# Patient Record
Sex: MALE | Race: WHITE | Employment: OTHER
[De-identification: names, ages, dates, MRNs, and addresses within clinical notes are randomized per-mention and may not be internally consistent; named-entity substitution may affect disease eponyms.]

---

## 2017-02-09 ENCOUNTER — TELEPHONE (OUTPATIENT)
Dept: UROLOGY | Facility: CLINIC | Age: 62
End: 2017-02-09

## 2017-02-23 ENCOUNTER — TELEPHONE (OUTPATIENT)
Dept: UROLOGY | Facility: CLINIC | Age: 62
End: 2017-02-23

## 2017-03-29 ENCOUNTER — TELEPHONE (OUTPATIENT)
Dept: UROLOGY | Age: 62
End: 2017-03-29

## 2017-04-03 ENCOUNTER — OFFICE VISIT (OUTPATIENT)
Dept: UROLOGY | Age: 62
End: 2017-04-03
Payer: COMMERCIAL

## 2017-04-03 VITALS
BODY MASS INDEX: 32.55 KG/M2 | WEIGHT: 240.3 LBS | SYSTOLIC BLOOD PRESSURE: 110 MMHG | DIASTOLIC BLOOD PRESSURE: 75 MMHG | HEIGHT: 72 IN | HEART RATE: 76 BPM | TEMPERATURE: 98.1 F

## 2017-04-03 DIAGNOSIS — Z12.5 PROSTATE CANCER SCREENING: ICD-10-CM

## 2017-04-03 DIAGNOSIS — N20.0 KIDNEY STONE: Primary | ICD-10-CM

## 2017-04-03 PROCEDURE — 99213 OFFICE O/P EST LOW 20 MIN: CPT | Performed by: UROLOGY

## 2017-04-03 RX ORDER — INSULIN GLARGINE 300 U/ML
58 INJECTION, SOLUTION SUBCUTANEOUS EVERY MORNING
COMMUNITY
Start: 2016-12-26 | End: 2019-06-14

## 2017-04-03 ASSESSMENT — ENCOUNTER SYMPTOMS
COUGH: 0
SHORTNESS OF BREATH: 0
NAUSEA: 0
EYE REDNESS: 0
ABDOMINAL PAIN: 0
BACK PAIN: 0
COLOR CHANGE: 0
VOMITING: 0
WHEEZING: 0
EYE PAIN: 0

## 2017-10-19 ENCOUNTER — HOSPITAL ENCOUNTER (EMERGENCY)
Age: 62
Discharge: HOME | End: 2017-10-19
Attending: EMERGENCY MEDICINE
Payer: COMMERCIAL

## 2017-10-19 ENCOUNTER — APPOINTMENT (OUTPATIENT)
Dept: CT IMAGING | Age: 62
End: 2017-10-19
Payer: COMMERCIAL

## 2017-10-19 VITALS
WEIGHT: 230 LBS | TEMPERATURE: 97.5 F | OXYGEN SATURATION: 97 % | BODY MASS INDEX: 31.15 KG/M2 | DIASTOLIC BLOOD PRESSURE: 75 MMHG | SYSTOLIC BLOOD PRESSURE: 137 MMHG | RESPIRATION RATE: 20 BRPM | HEIGHT: 72 IN | HEART RATE: 87 BPM

## 2017-10-19 DIAGNOSIS — N20.0 KIDNEY STONE: Primary | ICD-10-CM

## 2017-10-19 LAB
-: ABNORMAL
ABSOLUTE BANDS #: 0.27 K/UL (ref 0–1)
ABSOLUTE EOS #: 0.18 K/UL (ref 0–0.4)
ABSOLUTE IMMATURE GRANULOCYTE: ABNORMAL K/UL (ref 0–0.3)
ABSOLUTE LYMPH #: 0.9 K/UL (ref 1–4.8)
ABSOLUTE MONO #: 0.77 K/UL (ref 0.1–1.3)
ALBUMIN SERPL-MCNC: 4.4 G/DL (ref 3.5–5.2)
ALBUMIN/GLOBULIN RATIO: ABNORMAL (ref 1–2.5)
ALP BLD-CCNC: 39 U/L (ref 40–129)
ALT SERPL-CCNC: 52 U/L (ref 5–41)
AMORPHOUS: ABNORMAL
ANION GAP SERPL CALCULATED.3IONS-SCNC: 14 MMOL/L (ref 9–17)
AST SERPL-CCNC: 33 U/L
BACTERIA: ABNORMAL
BANDS: 6 %
BASOPHILS # BLD: 1 %
BASOPHILS ABSOLUTE: 0.05 K/UL (ref 0–0.2)
BILIRUB SERPL-MCNC: 0.57 MG/DL (ref 0.3–1.2)
BILIRUBIN URINE: NEGATIVE
BUN BLDV-MCNC: 22 MG/DL (ref 8–23)
BUN/CREAT BLD: ABNORMAL (ref 9–20)
CALCIUM SERPL-MCNC: 9.5 MG/DL (ref 8.6–10.4)
CASTS UA: ABNORMAL /LPF
CHLORIDE BLD-SCNC: 103 MMOL/L (ref 98–107)
CO2: 22 MMOL/L (ref 20–31)
COLOR: YELLOW
COMMENT UA: ABNORMAL
CREAT SERPL-MCNC: 1.01 MG/DL (ref 0.7–1.2)
CRYSTALS, UA: ABNORMAL /HPF
DIFFERENTIAL TYPE: ABNORMAL
EOSINOPHILS RELATIVE PERCENT: 4 %
EPITHELIAL CELLS UA: ABNORMAL /HPF
GFR AFRICAN AMERICAN: >60 ML/MIN
GFR NON-AFRICAN AMERICAN: >60 ML/MIN
GFR SERPL CREATININE-BSD FRML MDRD: ABNORMAL ML/MIN/{1.73_M2}
GFR SERPL CREATININE-BSD FRML MDRD: ABNORMAL ML/MIN/{1.73_M2}
GLUCOSE BLD-MCNC: 252 MG/DL (ref 70–99)
GLUCOSE URINE: ABNORMAL
HCT VFR BLD CALC: 50.3 % (ref 41–53)
HEMOGLOBIN: 17.4 G/DL (ref 13.5–17.5)
IMMATURE GRANULOCYTES: ABNORMAL %
KETONES, URINE: NEGATIVE
LEUKOCYTE ESTERASE, URINE: NEGATIVE
LIPASE: 59 U/L (ref 13–60)
LYMPHOCYTES # BLD: 20 %
MCH RBC QN AUTO: 30.5 PG (ref 26–34)
MCHC RBC AUTO-ENTMCNC: 34.6 G/DL (ref 31–37)
MCV RBC AUTO: 88.1 FL (ref 80–100)
MONOCYTES # BLD: 17 %
MORPHOLOGY: NORMAL
MUCUS: ABNORMAL
NITRITE, URINE: NEGATIVE
OTHER OBSERVATIONS UA: ABNORMAL
PDW BLD-RTO: 13 % (ref 11.5–14.9)
PH UA: 6.5 (ref 5–8)
PLATELET # BLD: 181 K/UL (ref 150–450)
PLATELET ESTIMATE: ABNORMAL
PMV BLD AUTO: 9.2 FL (ref 6–12)
POTASSIUM SERPL-SCNC: 4.5 MMOL/L (ref 3.7–5.3)
PROTEIN UA: NEGATIVE
RBC # BLD: 5.71 M/UL (ref 4.5–5.9)
RBC # BLD: ABNORMAL 10*6/UL
RBC UA: ABNORMAL /HPF
RENAL EPITHELIAL, UA: ABNORMAL /HPF
SEG NEUTROPHILS: 52 %
SEGMENTED NEUTROPHILS ABSOLUTE COUNT: 2.33 K/UL (ref 1.3–9.1)
SODIUM BLD-SCNC: 139 MMOL/L (ref 135–144)
SPECIFIC GRAVITY UA: 1.03 (ref 1–1.03)
TOTAL PROTEIN: 7.6 G/DL (ref 6.4–8.3)
TRICHOMONAS: ABNORMAL
TURBIDITY: CLEAR
URINE HGB: ABNORMAL
UROBILINOGEN, URINE: NORMAL
WBC # BLD: 4.5 K/UL (ref 3.5–11)
WBC # BLD: ABNORMAL 10*3/UL
WBC UA: ABNORMAL /HPF
YEAST: ABNORMAL

## 2017-10-19 PROCEDURE — 74176 CT ABD & PELVIS W/O CONTRAST: CPT

## 2017-10-19 PROCEDURE — 96375 TX/PRO/DX INJ NEW DRUG ADDON: CPT

## 2017-10-19 PROCEDURE — 80053 COMPREHEN METABOLIC PANEL: CPT

## 2017-10-19 PROCEDURE — 36415 COLL VENOUS BLD VENIPUNCTURE: CPT

## 2017-10-19 PROCEDURE — 99284 EMERGENCY DEPT VISIT MOD MDM: CPT

## 2017-10-19 PROCEDURE — 81001 URINALYSIS AUTO W/SCOPE: CPT

## 2017-10-19 PROCEDURE — 83690 ASSAY OF LIPASE: CPT

## 2017-10-19 PROCEDURE — 2580000003 HC RX 258: Performed by: EMERGENCY MEDICINE

## 2017-10-19 PROCEDURE — 85025 COMPLETE CBC W/AUTO DIFF WBC: CPT

## 2017-10-19 PROCEDURE — 96374 THER/PROPH/DIAG INJ IV PUSH: CPT

## 2017-10-19 PROCEDURE — 6360000002 HC RX W HCPCS: Performed by: EMERGENCY MEDICINE

## 2017-10-19 RX ORDER — ONDANSETRON 2 MG/ML
4 INJECTION INTRAMUSCULAR; INTRAVENOUS ONCE
Status: COMPLETED | OUTPATIENT
Start: 2017-10-19 | End: 2017-10-19

## 2017-10-19 RX ORDER — KETOROLAC TROMETHAMINE 30 MG/ML
30 INJECTION, SOLUTION INTRAMUSCULAR; INTRAVENOUS ONCE
Status: COMPLETED | OUTPATIENT
Start: 2017-10-19 | End: 2017-10-19

## 2017-10-19 RX ORDER — 0.9 % SODIUM CHLORIDE 0.9 %
1000 INTRAVENOUS SOLUTION INTRAVENOUS ONCE
Status: COMPLETED | OUTPATIENT
Start: 2017-10-19 | End: 2017-10-19

## 2017-10-19 RX ADMIN — ONDANSETRON 4 MG: 2 INJECTION INTRAMUSCULAR; INTRAVENOUS at 10:26

## 2017-10-19 RX ADMIN — KETOROLAC TROMETHAMINE 30 MG: 30 INJECTION, SOLUTION INTRAMUSCULAR at 10:26

## 2017-10-19 RX ADMIN — SODIUM CHLORIDE 1000 ML: 9 INJECTION, SOLUTION INTRAVENOUS at 10:27

## 2017-10-19 ASSESSMENT — ENCOUNTER SYMPTOMS
EYE DISCHARGE: 0
NAUSEA: 1
ABDOMINAL PAIN: 1
SHORTNESS OF BREATH: 0
VOMITING: 1
EYE REDNESS: 0
RHINORRHEA: 0
EYE PAIN: 0
COUGH: 0
DIARRHEA: 0
BACK PAIN: 0

## 2017-10-19 ASSESSMENT — PAIN DESCRIPTION - PAIN TYPE: TYPE: ACUTE PAIN

## 2017-10-19 ASSESSMENT — PAIN DESCRIPTION - DESCRIPTORS: DESCRIPTORS: CONSTANT

## 2017-10-19 ASSESSMENT — PAIN DESCRIPTION - LOCATION
LOCATION: ABDOMEN
LOCATION: FLANK

## 2017-10-19 ASSESSMENT — PAIN SCALES - GENERAL
PAINLEVEL_OUTOF10: 7
PAINLEVEL_OUTOF10: 7
PAINLEVEL_OUTOF10: 3

## 2017-10-19 ASSESSMENT — PAIN DESCRIPTION - ORIENTATION
ORIENTATION: LEFT
ORIENTATION: LEFT

## 2017-10-19 NOTE — ED PROVIDER NOTES
quadrant with some guarding yet no rebound. No skin changes). Musculoskeletal: Normal range of motion. Neurological: He is alert and oriented to person, place, and time. Skin: Skin is warm and dry. Nursing note and vitals reviewed. DIFFERENTIAL DIAGNOSIS/ MDM:     Patient with some left-sided abdominal and flank pain. At this time labs urine and CT pending    1130: Patient he is a stable condition. Patient's symptoms have improved. Vital signs to continue to be stable. Workup thus far does show some chronic changes especially the CT scan which does not show anything specifically new. At this time patient stable for discharge with follow-up to primary care physician    DIAGNOSTIC RESULTS       RADIOLOGY:   I directly visualized the following  images and reviewed the radiologist interpretations:  CT ABDOMEN PELVIS WO IV CONTRAST   Final Result   Duplicated left urinary collecting system with increased stone burden in the   left upper pole moiety mid segment ureter. However, stable degree of left   upper pole moiety hydroureteronephrosis. There is left lower pole moiety nephrolithiasis without significant urinary   tract dilatation. Cholelithiasis is redemonstrated.                 LABS:  Labs Reviewed   COMPREHENSIVE METABOLIC PANEL - Abnormal; Notable for the following:        Result Value    Glucose 252 (*)     Alkaline Phosphatase 39 (*)     ALT 52 (*)     All other components within normal limits   URINALYSIS - Abnormal; Notable for the following:     Glucose, Ur 3+ (*)     Specific Gravity, UA 1.032 (*)     Urine Hgb MOD (*)     All other components within normal limits   CBC WITH AUTO DIFFERENTIAL   LIPASE   MICROSCOPIC URINALYSIS           EMERGENCY DEPARTMENT COURSE:   Vitals:    Vitals:    10/19/17 0853 10/19/17 1015   BP: (!) 144/69 121/75   Pulse: 71 80   Resp: 16 18   Temp: 97.5 °F (36.4 °C)    TempSrc: Oral    SpO2: 99% 93%   Weight: 230 lb (104.3 kg)    Height: 6' (1.829 m)

## 2017-10-23 ENCOUNTER — OFFICE VISIT (OUTPATIENT)
Dept: UROLOGY | Age: 62
End: 2017-10-23
Payer: COMMERCIAL

## 2017-10-23 ENCOUNTER — HOSPITAL ENCOUNTER (OUTPATIENT)
Age: 62
Setting detail: SPECIMEN
Discharge: HOME | End: 2017-10-23
Payer: COMMERCIAL

## 2017-10-23 VITALS
HEIGHT: 72 IN | HEART RATE: 76 BPM | WEIGHT: 230 LBS | DIASTOLIC BLOOD PRESSURE: 78 MMHG | BODY MASS INDEX: 31.15 KG/M2 | TEMPERATURE: 97.8 F | SYSTOLIC BLOOD PRESSURE: 132 MMHG

## 2017-10-23 DIAGNOSIS — Q62.5 DUPLICATED COLLECTING SYSTEM: ICD-10-CM

## 2017-10-23 DIAGNOSIS — N20.0 KIDNEY STONES: Primary | ICD-10-CM

## 2017-10-23 DIAGNOSIS — N13.2 HYDRONEPHROSIS WITH URINARY OBSTRUCTION DUE TO RENAL CALCULUS: ICD-10-CM

## 2017-10-23 PROCEDURE — 99214 OFFICE O/P EST MOD 30 MIN: CPT | Performed by: UROLOGY

## 2017-10-23 ASSESSMENT — ENCOUNTER SYMPTOMS
EYE REDNESS: 0
VOMITING: 0
ABDOMINAL PAIN: 0
EYE PAIN: 0
WHEEZING: 0
BACK PAIN: 0
SHORTNESS OF BREATH: 0
COLOR CHANGE: 0
COUGH: 1
NAUSEA: 0

## 2017-10-27 LAB
STONE COMPOSITION: NORMAL
STONE DESCRIPTION: NORMAL
STONE MASS: 552 MG
STONE NUMBER: 3
STONE SIZE: NORMAL MM

## 2017-11-16 ENCOUNTER — HOSPITAL ENCOUNTER (OUTPATIENT)
Dept: GENERAL RADIOLOGY | Age: 62
Discharge: HOME | End: 2017-11-16
Payer: COMMERCIAL

## 2017-11-16 ENCOUNTER — HOSPITAL ENCOUNTER (OUTPATIENT)
Dept: ULTRASOUND IMAGING | Age: 62
Discharge: HOME | End: 2017-11-16
Payer: COMMERCIAL

## 2017-11-16 ENCOUNTER — HOSPITAL ENCOUNTER (OUTPATIENT)
Age: 62
Discharge: HOME | End: 2017-11-16
Payer: COMMERCIAL

## 2017-11-16 DIAGNOSIS — N20.0 KIDNEY STONES: ICD-10-CM

## 2017-11-16 PROCEDURE — 76775 US EXAM ABDO BACK WALL LIM: CPT

## 2017-11-16 PROCEDURE — 74000 XR ABDOMEN LIMITED (KUB): CPT

## 2017-12-04 ENCOUNTER — OFFICE VISIT (OUTPATIENT)
Dept: UROLOGY | Age: 62
End: 2017-12-04
Payer: COMMERCIAL

## 2017-12-04 VITALS
WEIGHT: 229.28 LBS | HEIGHT: 72 IN | TEMPERATURE: 98.2 F | HEART RATE: 76 BPM | SYSTOLIC BLOOD PRESSURE: 131 MMHG | DIASTOLIC BLOOD PRESSURE: 70 MMHG | BODY MASS INDEX: 31.05 KG/M2

## 2017-12-04 DIAGNOSIS — N20.0 KIDNEY STONES: Primary | ICD-10-CM

## 2017-12-04 DIAGNOSIS — R35.1 NOCTURIA: ICD-10-CM

## 2017-12-04 DIAGNOSIS — R35.0 FREQUENT URINATION: ICD-10-CM

## 2017-12-04 DIAGNOSIS — N13.2 HYDRONEPHROSIS WITH URINARY OBSTRUCTION DUE TO RENAL CALCULUS: ICD-10-CM

## 2017-12-04 PROCEDURE — 99214 OFFICE O/P EST MOD 30 MIN: CPT | Performed by: UROLOGY

## 2017-12-04 ASSESSMENT — ENCOUNTER SYMPTOMS
EYE REDNESS: 0
ABDOMINAL PAIN: 0
COUGH: 0
NAUSEA: 0
BACK PAIN: 0
WHEEZING: 0
COLOR CHANGE: 0
SHORTNESS OF BREATH: 0
EYE PAIN: 0
VOMITING: 0

## 2017-12-04 NOTE — PROGRESS NOTES
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  CYSTOSCOPY  09/30/2015    HERNIA REPAIR Left     inguinal    KNEE ARTHROSCOPY      LITHOTRIPSY Left     x2    LITHOTRIPSY  09/30/15    laser    LUNG SURGERY Right 5/23/2011    right lower lobe \"lung carcinoid\" removal    PRE-MALIGNANT / BENIGN SKIN LESION EXCISION Left 6/9/2014    Excision lesion of nose. Dr. Katina Parker.     SHOULDER SURGERY Right     closed manipulation    SHOULDER SURGERY Left     open manipulation    SKIN BIOPSY  11/12/2007    michael acilla and neck--squamous papillomas--lft buttock--subcutaneous abscess, back--compound nevus, lft neck--lentigo simplex, lft chest --junctional nevus, rt forearm--blue nevus    SKIN BIOPSY  12/9/2009    lft thigh--follicular cyst    SKIN BIOPSY  2/10/2010    rt thigh--ruptured epidermal inclusion cyst    TONSILLECTOMY      URETER STENT PLACEMENT Left 09/30/15    tiems 2 stents     Family History   Problem Relation Age of Onset    Heart Disease Mother     Diabetes Mother     Heart Disease Father     Diabetes Sister     Diabetes Maternal Grandmother      Outpatient Prescriptions Marked as Taking for the 12/4/17 encounter (Office Visit) with Carlos Peña MD   Medication Sig Dispense Refill   [employer] Dapagliflozin Propanediol (FARXIGA PO) Take by mouth      TOUJEO SOLOSTAR 300 UNIT/ML injection pen Inject 60 Units into the skin every morning      VENTOLIN  (90 BASE) MCG/ACT inhaler inhale 2 puff every 4 hours prn  0    JANUMET XR  MG TB24 tablet Take 2 tablets by mouth daily      B-D UF III MINI PEN NEEDLES 31G X 5 MM MISC       lovastatin (MEVACOR) 20 MG tablet Take 20 mg by mouth daily       omega-3 acid ethyl esters (LOVAZA) 1 G capsule Take 2 g by mouth daily.  LOSARTAN POTASSIUM PO Take 50 mg by mouth daily       GLIMEPIRIDE PO Take 8 mg by mouth daily       Montelukast Sodium (SINGULAIR PO) Take 1 tablet by mouth daily.  Fenofibrate (TRICOR PO) Take 145 mg by mouth daily       aspirin 81 MG chewable tablet Take 81 mg by mouth daily. Review of patient's allergies indicates no known allergies. History   Smoking Status    Never Smoker   Smokeless Tobacco    Never Used     (If patient a smoker, smoking cessation counseling offered)    History   Alcohol Use    Yes     Comment: once a week       REVIEW OF SYSTEMS:  Review of Systems   Constitutional: Negative for activity change, chills and fever. Eyes: Negative for pain, redness and visual disturbance. Respiratory: Negative for cough, shortness of breath and wheezing. Cardiovascular: Negative for chest pain and leg swelling. Gastrointestinal: Negative for abdominal pain, nausea and vomiting. Genitourinary: Negative for difficulty urinating, discharge, dysuria, flank pain, frequency, hematuria, scrotal swelling and testicular pain. Musculoskeletal: Negative for back pain, joint swelling and myalgias. Skin: Negative for color change and rash. Neurological: Negative for dizziness, tremors and numbness. Hematological: Negative for adenopathy. Does not bruise/bleed easily.        Physical Exam:      Vitals:    12/04/17 0856   BP: 131/70   Pulse: 76   Temp: 98.2 °F (36.8 °C)     Body mass index is 31.09 kg/m². Patient is a 58 y.o. male in no acute distress and alert and oriented to person, place and time. Physical Exam  Constitutional: Patient in no acute distress. Neuro: Alert and oriented to person, place and time. Psych: Mood normal, affect normal  Skin: No rash noted  HEENT: Head: Normocephalic and atraumatic  Conjunctivae and EOM are normal. Pupils are equal, round  Nose: Normal  Right External Ear: Normal; Left External Ear: Normal  Mouth: Mucosa Moist  Neck: Supple  Lungs: Respiratory effort is normal  Cardiovascular: Warm & Pink  Abdomen: Soft, non-tender, non-distended with no CVA,  No flank tenderness,  Or hepatosplenomegaly   Lymphatics: No palpable lymphadenopathy. Bladder non-tender and not distended. Musculoskeletal: Normal gait and station      Assessment and Plan      1. Kidney stones    2. Hydronephrosis with urinary obstruction due to renal calculus    3. Nocturia    4. Frequent urination           Plan: left eswl     No Follow-up on file. Prescriptions Ordered:  No orders of the defined types were placed in this encounter. Orders Placed:  No orders of the defined types were placed in this encounter.          Sendy Doe MD

## 2017-12-15 ENCOUNTER — TELEPHONE (OUTPATIENT)
Dept: UROLOGY | Age: 62
End: 2017-12-15

## 2017-12-15 NOTE — TELEPHONE ENCOUNTER
(LT) ESWL (LT) Possible stent placement @ ST 1/10/17 2:00pm **STOP Blood Thinners 1/3/17**  PAT @ [facility] 12/28/17 1:30pm             Spoke with patient's wife, procedure info mailed 12/15/17.

## 2017-12-28 ENCOUNTER — HOSPITAL ENCOUNTER (OUTPATIENT)
Dept: PREADMISSION TESTING | Age: 62
Discharge: HOME | End: 2017-12-28
Payer: COMMERCIAL

## 2017-12-28 ENCOUNTER — HOSPITAL ENCOUNTER (OUTPATIENT)
Age: 62
Discharge: HOME | End: 2017-12-28
Payer: COMMERCIAL

## 2017-12-28 VITALS
BODY MASS INDEX: 31.29 KG/M2 | OXYGEN SATURATION: 96 % | WEIGHT: 231 LBS | TEMPERATURE: 97.6 F | HEIGHT: 72 IN | SYSTOLIC BLOOD PRESSURE: 136 MMHG | DIASTOLIC BLOOD PRESSURE: 82 MMHG | HEART RATE: 89 BPM | RESPIRATION RATE: 18 BRPM

## 2017-12-28 LAB
BUN BLDV-MCNC: 17 MG/DL (ref 8–23)
CREAT SERPL-MCNC: 1.01 MG/DL (ref 0.7–1.2)
EKG ATRIAL RATE: 83 BPM
EKG P AXIS: 37 DEGREES
EKG P-R INTERVAL: 180 MS
EKG Q-T INTERVAL: 388 MS
EKG QRS DURATION: 94 MS
EKG QTC CALCULATION (BAZETT): 455 MS
EKG R AXIS: 84 DEGREES
EKG T AXIS: 60 DEGREES
EKG VENTRICULAR RATE: 83 BPM
GFR AFRICAN AMERICAN: >60 ML/MIN
GFR NON-AFRICAN AMERICAN: >60 ML/MIN
GFR SERPL CREATININE-BSD FRML MDRD: NORMAL ML/MIN/{1.73_M2}
GFR SERPL CREATININE-BSD FRML MDRD: NORMAL ML/MIN/{1.73_M2}
GLUCOSE BLD-MCNC: 125 MG/DL (ref 70–99)
PROSTATE SPECIFIC ANTIGEN: 0.17 UG/L

## 2017-12-28 PROCEDURE — 82565 ASSAY OF CREATININE: CPT

## 2017-12-28 PROCEDURE — 82947 ASSAY GLUCOSE BLOOD QUANT: CPT

## 2017-12-28 PROCEDURE — 93005 ELECTROCARDIOGRAM TRACING: CPT

## 2017-12-28 PROCEDURE — 84520 ASSAY OF UREA NITROGEN: CPT

## 2017-12-28 PROCEDURE — 36415 COLL VENOUS BLD VENIPUNCTURE: CPT

## 2017-12-28 PROCEDURE — 87086 URINE CULTURE/COLONY COUNT: CPT

## 2017-12-28 PROCEDURE — G0103 PSA SCREENING: HCPCS

## 2017-12-28 RX ORDER — SODIUM CHLORIDE, SODIUM LACTATE, POTASSIUM CHLORIDE, CALCIUM CHLORIDE 600; 310; 30; 20 MG/100ML; MG/100ML; MG/100ML; MG/100ML
1000 INJECTION, SOLUTION INTRAVENOUS CONTINUOUS
Status: CANCELLED | OUTPATIENT
Start: 2017-12-28

## 2017-12-28 NOTE — H&P
inhaler, inhale 2 puff every 4 hours prn, Disp: , Rfl: 0    JANUMET XR  MG TB24 tablet, Take 2 tablets by mouth daily, Disp: , Rfl:     lovastatin (MEVACOR) 20 MG tablet, Take 20 mg by mouth daily , Disp: , Rfl:     omega-3 acid ethyl esters (LOVAZA) 1 G capsule, Take 2 g by mouth daily. , Disp: , Rfl:     LOSARTAN POTASSIUM PO, Take 50 mg by mouth daily , Disp: , Rfl:     GLIMEPIRIDE PO, Take 8 mg by mouth daily , Disp: , Rfl:     Montelukast Sodium (SINGULAIR PO), Take 1 tablet by mouth daily. , Disp: , Rfl:     aspirin 81 MG chewable tablet, Take 81 mg by mouth daily Will be stopping 1/3/18, Disp: , Rfl:     B-D UF III MINI PEN NEEDLES 31G X 5 MM MISC,   , Disp: , Rfl:   Allergies  is allergic to seasonal.  Family History  family history includes Diabetes in his maternal grandmother, mother, and sister; Heart Disease in his father and mother. Social History   reports that he has never smoked. He has never used smokeless tobacco.   reports that he drinks alcohol. reports that he does not use drugs. Marital Status:   Children: one son, one daughter and two grandchildren  Occupation:     OBJECTIVE:     VITALS:  height is 6' (1.829 m) and weight is 231 lb (104.8 kg). His oral temperature is 97.6 °F (36.4 °C). His blood pressure is 136/82 and his pulse is 89. His respiration is 18 and oxygen saturation is 96%. CONSTITUTIONAL: Alert & oriented x 3, no acute distress. SKIN:  Warm and dry, no rash or erythema. HEAD:  Normocephalic, atraumatic. EYES: PERRLA. EOMs intact. Wears glasses. EARS:  Hearing grossly normal with hearing aids. NOSE:  Nares patent. Septum midline. No rhinorrhea   MOUTH/THROAT:  Unremarkable   NECK: Supple with no lymphadenopathy. LUNGS: Clear to auscultation throughout. No wheezes, rales or rhonchi. CARDIOVASCULAR: HRR. Rhythm without murmur, click, gallop or rub. ABDOMEN: Soft, non tender, non distended, no masses or organomegaly.

## 2017-12-29 LAB
CULTURE: NORMAL
CULTURE: NORMAL
Lab: NORMAL
SPECIMEN DESCRIPTION: NORMAL
STATUS: NORMAL

## 2018-01-10 ENCOUNTER — ANESTHESIA (OUTPATIENT)
Dept: OPERATING ROOM | Age: 63
End: 2018-01-10
Payer: COMMERCIAL

## 2018-01-10 ENCOUNTER — ANESTHESIA EVENT (OUTPATIENT)
Dept: OPERATING ROOM | Age: 63
End: 2018-01-10
Payer: COMMERCIAL

## 2018-01-10 ENCOUNTER — HOSPITAL ENCOUNTER (OUTPATIENT)
Age: 63
Setting detail: OUTPATIENT SURGERY
Discharge: HOME | End: 2018-01-10
Attending: UROLOGY | Admitting: UROLOGY
Payer: COMMERCIAL

## 2018-01-10 ENCOUNTER — APPOINTMENT (OUTPATIENT)
Dept: GENERAL RADIOLOGY | Age: 63
End: 2018-01-10
Attending: UROLOGY
Payer: COMMERCIAL

## 2018-01-10 VITALS
TEMPERATURE: 95.7 F | DIASTOLIC BLOOD PRESSURE: 64 MMHG | OXYGEN SATURATION: 99 % | SYSTOLIC BLOOD PRESSURE: 101 MMHG | RESPIRATION RATE: 18 BRPM

## 2018-01-10 VITALS
TEMPERATURE: 97.2 F | WEIGHT: 231 LBS | SYSTOLIC BLOOD PRESSURE: 152 MMHG | OXYGEN SATURATION: 98 % | RESPIRATION RATE: 15 BRPM | DIASTOLIC BLOOD PRESSURE: 86 MMHG | HEIGHT: 72 IN | BODY MASS INDEX: 31.29 KG/M2 | HEART RATE: 71 BPM

## 2018-01-10 DIAGNOSIS — N20.0 RENAL CALCULUS, LEFT: Primary | ICD-10-CM

## 2018-01-10 LAB
GLUCOSE BLD-MCNC: 114 MG/DL (ref 75–110)
GLUCOSE BLD-MCNC: 89 MG/DL (ref 75–110)

## 2018-01-10 PROCEDURE — 7100000000 HC PACU RECOVERY - FIRST 15 MIN: Performed by: UROLOGY

## 2018-01-10 PROCEDURE — 7100000011 HC PHASE II RECOVERY - ADDTL 15 MIN: Performed by: UROLOGY

## 2018-01-10 PROCEDURE — 3700000000 HC ANESTHESIA ATTENDED CARE: Performed by: UROLOGY

## 2018-01-10 PROCEDURE — 6360000002 HC RX W HCPCS: Performed by: NURSE ANESTHETIST, CERTIFIED REGISTERED

## 2018-01-10 PROCEDURE — 3600000012 HC SURGERY LEVEL 2 ADDTL 15MIN: Performed by: UROLOGY

## 2018-01-10 PROCEDURE — 2580000003 HC RX 258: Performed by: ANESTHESIOLOGY

## 2018-01-10 PROCEDURE — 3700000001 HC ADD 15 MINUTES (ANESTHESIA): Performed by: UROLOGY

## 2018-01-10 PROCEDURE — 7100000010 HC PHASE II RECOVERY - FIRST 15 MIN: Performed by: UROLOGY

## 2018-01-10 PROCEDURE — 7100000001 HC PACU RECOVERY - ADDTL 15 MIN: Performed by: UROLOGY

## 2018-01-10 PROCEDURE — 2500000003 HC RX 250 WO HCPCS: Performed by: NURSE ANESTHETIST, CERTIFIED REGISTERED

## 2018-01-10 PROCEDURE — 3600000002 HC SURGERY LEVEL 2 BASE: Performed by: UROLOGY

## 2018-01-10 PROCEDURE — 82947 ASSAY GLUCOSE BLOOD QUANT: CPT

## 2018-01-10 PROCEDURE — 74018 RADEX ABDOMEN 1 VIEW: CPT

## 2018-01-10 PROCEDURE — 6360000002 HC RX W HCPCS

## 2018-01-10 RX ORDER — DOCUSATE SODIUM 100 MG/1
100 CAPSULE, LIQUID FILLED ORAL DAILY PRN
Qty: 30 CAPSULE | Refills: 0 | Status: SHIPPED | OUTPATIENT
Start: 2018-01-10 | End: 2018-10-22 | Stop reason: ALTCHOICE

## 2018-01-10 RX ORDER — SODIUM CHLORIDE, SODIUM LACTATE, POTASSIUM CHLORIDE, CALCIUM CHLORIDE 600; 310; 30; 20 MG/100ML; MG/100ML; MG/100ML; MG/100ML
1000 INJECTION, SOLUTION INTRAVENOUS CONTINUOUS
Status: DISCONTINUED | OUTPATIENT
Start: 2018-01-10 | End: 2018-01-10 | Stop reason: HOSPADM

## 2018-01-10 RX ORDER — LIDOCAINE HYDROCHLORIDE 10 MG/ML
INJECTION, SOLUTION EPIDURAL; INFILTRATION; INTRACAUDAL; PERINEURAL PRN
Status: DISCONTINUED | OUTPATIENT
Start: 2018-01-10 | End: 2018-01-10 | Stop reason: SDUPTHER

## 2018-01-10 RX ORDER — ONDANSETRON 2 MG/ML
INJECTION INTRAMUSCULAR; INTRAVENOUS PRN
Status: DISCONTINUED | OUTPATIENT
Start: 2018-01-10 | End: 2018-01-10 | Stop reason: SDUPTHER

## 2018-01-10 RX ORDER — ONDANSETRON 2 MG/ML
4 INJECTION INTRAMUSCULAR; INTRAVENOUS
Status: DISCONTINUED | OUTPATIENT
Start: 2018-01-10 | End: 2018-01-10 | Stop reason: HOSPADM

## 2018-01-10 RX ORDER — MIDAZOLAM HYDROCHLORIDE 1 MG/ML
INJECTION INTRAMUSCULAR; INTRAVENOUS PRN
Status: DISCONTINUED | OUTPATIENT
Start: 2018-01-10 | End: 2018-01-10 | Stop reason: SDUPTHER

## 2018-01-10 RX ORDER — ASPIRIN 81 MG/1
81 TABLET, CHEWABLE ORAL DAILY
Qty: 30 TABLET | Refills: 0 | Status: ON HOLD | OUTPATIENT
Start: 2018-01-10 | End: 2022-10-14 | Stop reason: HOSPADM

## 2018-01-10 RX ORDER — TAMSULOSIN HYDROCHLORIDE 0.4 MG/1
0.4 CAPSULE ORAL DAILY
Qty: 30 CAPSULE | Refills: 0 | Status: SHIPPED | OUTPATIENT
Start: 2018-01-10 | End: 2018-04-16

## 2018-01-10 RX ORDER — FENTANYL CITRATE 50 UG/ML
INJECTION, SOLUTION INTRAMUSCULAR; INTRAVENOUS PRN
Status: DISCONTINUED | OUTPATIENT
Start: 2018-01-10 | End: 2018-01-10 | Stop reason: SDUPTHER

## 2018-01-10 RX ORDER — PROPOFOL 10 MG/ML
INJECTION, EMULSION INTRAVENOUS PRN
Status: DISCONTINUED | OUTPATIENT
Start: 2018-01-10 | End: 2018-01-10 | Stop reason: SDUPTHER

## 2018-01-10 RX ORDER — FENTANYL CITRATE 50 UG/ML
50 INJECTION, SOLUTION INTRAMUSCULAR; INTRAVENOUS EVERY 5 MIN PRN
Status: DISCONTINUED | OUTPATIENT
Start: 2018-01-10 | End: 2018-01-10 | Stop reason: HOSPADM

## 2018-01-10 RX ORDER — OXYCODONE HYDROCHLORIDE AND ACETAMINOPHEN 5; 325 MG/1; MG/1
TABLET ORAL
Qty: 20 TABLET | Refills: 0 | Status: SHIPPED | OUTPATIENT
Start: 2018-01-10 | End: 2018-01-15

## 2018-01-10 RX ORDER — FENTANYL CITRATE 50 UG/ML
25 INJECTION, SOLUTION INTRAMUSCULAR; INTRAVENOUS EVERY 5 MIN PRN
Status: DISCONTINUED | OUTPATIENT
Start: 2018-01-10 | End: 2018-01-10 | Stop reason: HOSPADM

## 2018-01-10 RX ORDER — DEXAMETHASONE SODIUM PHOSPHATE 10 MG/ML
INJECTION INTRAMUSCULAR; INTRAVENOUS PRN
Status: DISCONTINUED | OUTPATIENT
Start: 2018-01-10 | End: 2018-01-10 | Stop reason: SDUPTHER

## 2018-01-10 RX ADMIN — MIDAZOLAM HYDROCHLORIDE 2 MG: 1 INJECTION, SOLUTION INTRAMUSCULAR; INTRAVENOUS at 15:40

## 2018-01-10 RX ADMIN — LIDOCAINE HYDROCHLORIDE 50 MG: 10 INJECTION, SOLUTION EPIDURAL; INFILTRATION; INTRACAUDAL; PERINEURAL at 15:40

## 2018-01-10 RX ADMIN — SODIUM CHLORIDE, POTASSIUM CHLORIDE, SODIUM LACTATE AND CALCIUM CHLORIDE 1000 ML: 600; 310; 30; 20 INJECTION, SOLUTION INTRAVENOUS at 13:03

## 2018-01-10 RX ADMIN — FENTANYL CITRATE 50 MCG: 50 INJECTION INTRAMUSCULAR; INTRAVENOUS at 15:40

## 2018-01-10 RX ADMIN — ONDANSETRON 4 MG: 2 INJECTION INTRAMUSCULAR; INTRAVENOUS at 15:50

## 2018-01-10 RX ADMIN — PROPOFOL 150 MG: 10 INJECTION, EMULSION INTRAVENOUS at 15:40

## 2018-01-10 RX ADMIN — DEXAMETHASONE SODIUM PHOSPHATE 10 MG: 10 INJECTION INTRAMUSCULAR; INTRAVENOUS at 15:50

## 2018-01-10 ASSESSMENT — PULMONARY FUNCTION TESTS
PIF_VALUE: 17
PIF_VALUE: 18
PIF_VALUE: 15
PIF_VALUE: 18
PIF_VALUE: 17
PIF_VALUE: 18
PIF_VALUE: 18
PIF_VALUE: 17
PIF_VALUE: 18
PIF_VALUE: 1
PIF_VALUE: 16
PIF_VALUE: 17
PIF_VALUE: 18
PIF_VALUE: 17
PIF_VALUE: 18
PIF_VALUE: 18
PIF_VALUE: 3
PIF_VALUE: 17
PIF_VALUE: 18
PIF_VALUE: 16
PIF_VALUE: 18
PIF_VALUE: 1
PIF_VALUE: 18
PIF_VALUE: 3
PIF_VALUE: 17
PIF_VALUE: 19
PIF_VALUE: 17
PIF_VALUE: 18
PIF_VALUE: 17
PIF_VALUE: 18
PIF_VALUE: 18
PIF_VALUE: 17
PIF_VALUE: 9
PIF_VALUE: 18
PIF_VALUE: 17
PIF_VALUE: 17
PIF_VALUE: 1
PIF_VALUE: 17
PIF_VALUE: 17
PIF_VALUE: 21
PIF_VALUE: 18
PIF_VALUE: 19
PIF_VALUE: 15
PIF_VALUE: 17
PIF_VALUE: 18
PIF_VALUE: 1
PIF_VALUE: 17
PIF_VALUE: 4
PIF_VALUE: 17
PIF_VALUE: 18
PIF_VALUE: 17
PIF_VALUE: 2
PIF_VALUE: 17
PIF_VALUE: 18

## 2018-01-10 ASSESSMENT — PAIN SCALES - GENERAL
PAINLEVEL_OUTOF10: 0

## 2018-01-10 ASSESSMENT — ENCOUNTER SYMPTOMS
SHORTNESS OF BREATH: 0
STRIDOR: 0

## 2018-01-10 ASSESSMENT — PAIN - FUNCTIONAL ASSESSMENT: PAIN_FUNCTIONAL_ASSESSMENT: 0-10

## 2018-01-10 NOTE — H&P
History and Physical Update    Pt Name: Jasmyn Lino  MRN: [id number]  YOB: 1955  Date of evaluation: 1/10/2018    [x] I have examined the patient and reviewed the H&P/Consult and there are no changes to the patient or plans.     [] I have examined the patient and reviewed the H&P/Consult and have noted the following changes:        [facility]ty [facility]  electronically signed 1/10/2018 at 1:04 PM

## 2018-01-10 NOTE — ANESTHESIA PRE PROCEDURE
stents       Social History:    Social History   Substance Use Topics    Smoking status: Never Smoker    Smokeless tobacco: Never Used    Alcohol use Yes      Comment: once a week                                Counseling given: Not Answered      Vital Signs (Current):   Vitals:    01/10/18 1232 01/10/18 1245   BP:  136/85   Pulse:  79   Resp:  16   Temp:  97.3 °F (36.3 °C)   TempSrc:  Temporal   SpO2:  98%   Weight: 231 lb (104.8 kg)    Height: 6' (1.829 m)                                               BP Readings from Last 3 Encounters:   01/10/18 136/85   12/28/17 136/82   12/04/17 131/70       NPO Status: Time of last liquid consumption: 2230                        Time of last solid consumption: 2230                        Date of last liquid consumption: 01/09/18                        Date of last solid food consumption: 01/09/18    BMI:   Wt Readings from Last 3 Encounters:   01/10/18 231 lb (104.8 kg)   12/28/17 231 lb (104.8 kg)   12/04/17 229 lb 4.5 oz (104 kg)     Body mass index is 31.33 kg/m².     CBC:   Lab Results   Component Value Date    WBC 4.5 10/19/2017    RBC 5.71 10/19/2017    RBC 5.33 06/07/2012    HGB 17.4 10/19/2017    HCT 50.3 10/19/2017    MCV 88.1 10/19/2017    RDW 13.0 10/19/2017     10/19/2017     06/07/2012       CMP:   Lab Results   Component Value Date     10/19/2017    K 4.5 10/19/2017     10/19/2017    CO2 22 10/19/2017    BUN 17 12/28/2017    CREATININE 1.01 12/28/2017    GFRAA >60 12/28/2017    LABGLOM >60 12/28/2017    GLUCOSE 125 12/28/2017    PROT 7.6 10/19/2017    CALCIUM 9.5 10/19/2017    BILITOT 0.57 10/19/2017    ALKPHOS 39 10/19/2017    AST 33 10/19/2017    ALT 52 10/19/2017       POC Tests:   Recent Labs      01/10/18   1302   POCGLU  114*       Coags: No results found for: PROTIME, INR, APTT    HCG (If Applicable): No results found for: PREGTESTUR, PREGSERUM, HCG, HCGQUANT     ABGs: No results found for: PHART, PO2ART, STU7VDM, MCV9IHY,

## 2018-01-16 ENCOUNTER — TELEPHONE (OUTPATIENT)
Dept: UROLOGY | Age: 63
End: 2018-01-16

## 2018-01-16 DIAGNOSIS — N20.0 KIDNEY STONE: Primary | ICD-10-CM

## 2018-04-05 ENCOUNTER — HOSPITAL ENCOUNTER (OUTPATIENT)
Dept: GENERAL RADIOLOGY | Age: 63
Discharge: HOME | End: 2018-04-07
Payer: COMMERCIAL

## 2018-04-05 ENCOUNTER — HOSPITAL ENCOUNTER (OUTPATIENT)
Age: 63
Discharge: HOME | End: 2018-04-07
Payer: COMMERCIAL

## 2018-04-05 ENCOUNTER — HOSPITAL ENCOUNTER (OUTPATIENT)
Age: 63
Discharge: HOME | End: 2018-04-05
Payer: COMMERCIAL

## 2018-04-05 DIAGNOSIS — N20.0 KIDNEY STONES: ICD-10-CM

## 2018-04-05 LAB — PROSTATE SPECIFIC ANTIGEN: 0.18 UG/L

## 2018-04-05 PROCEDURE — 36415 COLL VENOUS BLD VENIPUNCTURE: CPT

## 2018-04-05 PROCEDURE — 84153 ASSAY OF PSA TOTAL: CPT

## 2018-04-05 PROCEDURE — 74018 RADEX ABDOMEN 1 VIEW: CPT

## 2018-04-16 ENCOUNTER — OFFICE VISIT (OUTPATIENT)
Dept: UROLOGY | Age: 63
End: 2018-04-16
Payer: COMMERCIAL

## 2018-04-16 VITALS
HEART RATE: 89 BPM | BODY MASS INDEX: 31.29 KG/M2 | DIASTOLIC BLOOD PRESSURE: 85 MMHG | RESPIRATION RATE: 16 BRPM | HEIGHT: 72 IN | SYSTOLIC BLOOD PRESSURE: 140 MMHG | WEIGHT: 231.04 LBS | TEMPERATURE: 98.1 F

## 2018-04-16 DIAGNOSIS — R39.15 URGENCY OF URINATION: ICD-10-CM

## 2018-04-16 DIAGNOSIS — N20.0 RENAL CALCULUS, LEFT: ICD-10-CM

## 2018-04-16 DIAGNOSIS — R35.0 URINARY FREQUENCY: ICD-10-CM

## 2018-04-16 DIAGNOSIS — N20.0 KIDNEY STONES: Primary | ICD-10-CM

## 2018-04-16 PROCEDURE — 99214 OFFICE O/P EST MOD 30 MIN: CPT | Performed by: UROLOGY

## 2018-04-16 ASSESSMENT — ENCOUNTER SYMPTOMS
VOMITING: 0
ABDOMINAL PAIN: 0
SHORTNESS OF BREATH: 0
NAUSEA: 0
BACK PAIN: 0
COLOR CHANGE: 0
EYE REDNESS: 0
EYE PAIN: 0
COUGH: 0
WHEEZING: 0

## 2018-08-25 ENCOUNTER — HOSPITAL ENCOUNTER (OUTPATIENT)
Age: 63
Discharge: HOME | End: 2018-08-25
Payer: COMMERCIAL

## 2018-08-25 LAB
ALBUMIN SERPL-MCNC: 4.6 G/DL (ref 3.5–5.2)
ALBUMIN/GLOBULIN RATIO: ABNORMAL (ref 1–2.5)
ALP BLD-CCNC: 43 U/L (ref 40–129)
ALT SERPL-CCNC: 32 U/L (ref 5–41)
ANION GAP SERPL CALCULATED.3IONS-SCNC: 14 MMOL/L (ref 9–17)
AST SERPL-CCNC: 26 U/L
BILIRUB SERPL-MCNC: 0.49 MG/DL (ref 0.3–1.2)
BUN BLDV-MCNC: 15 MG/DL (ref 8–23)
BUN/CREAT BLD: ABNORMAL (ref 9–20)
CALCIUM SERPL-MCNC: 9.9 MG/DL (ref 8.6–10.4)
CHLORIDE BLD-SCNC: 105 MMOL/L (ref 98–107)
CHOLESTEROL/HDL RATIO: 5.2
CHOLESTEROL: 130 MG/DL
CO2: 21 MMOL/L (ref 20–31)
CREAT SERPL-MCNC: 1.06 MG/DL (ref 0.7–1.2)
CREATININE URINE: 68.9 MG/DL (ref 39–259)
GFR AFRICAN AMERICAN: >60 ML/MIN
GFR NON-AFRICAN AMERICAN: >60 ML/MIN
GFR SERPL CREATININE-BSD FRML MDRD: ABNORMAL ML/MIN/{1.73_M2}
GFR SERPL CREATININE-BSD FRML MDRD: ABNORMAL ML/MIN/{1.73_M2}
GLUCOSE BLD-MCNC: 158 MG/DL (ref 70–99)
HDLC SERPL-MCNC: 25 MG/DL
LDL CHOLESTEROL DIRECT: 55 MG/DL
LDL CHOLESTEROL: ABNORMAL MG/DL (ref 0–130)
MICROALBUMIN/CREAT 24H UR: 32 MG/L
MICROALBUMIN/CREAT UR-RTO: 46 MCG/MG CREAT
POTASSIUM SERPL-SCNC: 4.4 MMOL/L (ref 3.7–5.3)
SODIUM BLD-SCNC: 140 MMOL/L (ref 135–144)
T3 FREE: 3.24 PG/ML (ref 2.02–4.43)
THYROXINE, FREE: 1.33 NG/DL (ref 0.93–1.7)
TOTAL CK: 30 U/L (ref 39–308)
TOTAL PROTEIN: 7.6 G/DL (ref 6.4–8.3)
TRIGL SERPL-MCNC: 427 MG/DL
TSH SERPL DL<=0.05 MIU/L-ACNC: 2.42 MIU/L (ref 0.3–5)
VLDLC SERPL CALC-MCNC: ABNORMAL MG/DL (ref 1–30)

## 2018-08-25 PROCEDURE — 84443 ASSAY THYROID STIM HORMONE: CPT

## 2018-08-25 PROCEDURE — 80061 LIPID PANEL: CPT

## 2018-08-25 PROCEDURE — 80053 COMPREHEN METABOLIC PANEL: CPT

## 2018-08-25 PROCEDURE — 36415 COLL VENOUS BLD VENIPUNCTURE: CPT

## 2018-08-25 PROCEDURE — 82043 UR ALBUMIN QUANTITATIVE: CPT

## 2018-08-25 PROCEDURE — 83721 ASSAY OF BLOOD LIPOPROTEIN: CPT

## 2018-08-25 PROCEDURE — 84439 ASSAY OF FREE THYROXINE: CPT

## 2018-08-25 PROCEDURE — 82570 ASSAY OF URINE CREATININE: CPT

## 2018-08-25 PROCEDURE — 82550 ASSAY OF CK (CPK): CPT

## 2018-08-25 PROCEDURE — 82607 VITAMIN B-12: CPT

## 2018-08-25 PROCEDURE — 84481 FREE ASSAY (FT-3): CPT

## 2018-08-27 LAB — VITAMIN B-12: 267 PG/ML (ref 232–1245)

## 2018-10-17 ENCOUNTER — TELEPHONE (OUTPATIENT)
Dept: UROLOGY | Age: 63
End: 2018-10-17

## 2018-10-17 ENCOUNTER — HOSPITAL ENCOUNTER (OUTPATIENT)
Dept: GENERAL RADIOLOGY | Age: 63
Discharge: HOME | End: 2018-10-19
Payer: COMMERCIAL

## 2018-10-17 ENCOUNTER — HOSPITAL ENCOUNTER (OUTPATIENT)
Age: 63
Discharge: HOME | End: 2018-10-19
Payer: COMMERCIAL

## 2018-10-17 DIAGNOSIS — N20.0 KIDNEY STONES: ICD-10-CM

## 2018-10-17 PROCEDURE — 74018 RADEX ABDOMEN 1 VIEW: CPT

## 2018-10-22 ENCOUNTER — OFFICE VISIT (OUTPATIENT)
Dept: UROLOGY | Age: 63
End: 2018-10-22
Payer: COMMERCIAL

## 2018-10-22 VITALS
BODY MASS INDEX: 31.29 KG/M2 | HEIGHT: 72 IN | HEART RATE: 84 BPM | SYSTOLIC BLOOD PRESSURE: 128 MMHG | TEMPERATURE: 97.9 F | DIASTOLIC BLOOD PRESSURE: 76 MMHG | WEIGHT: 231 LBS

## 2018-10-22 DIAGNOSIS — R10.9 FLANK PAIN: ICD-10-CM

## 2018-10-22 DIAGNOSIS — N20.0 RENAL CALCULUS, LEFT: Primary | ICD-10-CM

## 2018-10-22 PROCEDURE — 99214 OFFICE O/P EST MOD 30 MIN: CPT | Performed by: UROLOGY

## 2018-10-22 RX ORDER — ICOSAPENT ETHYL 1000 MG/1
1 CAPSULE ORAL DAILY
COMMUNITY
Start: 2018-09-17 | End: 2019-06-14

## 2018-10-22 ASSESSMENT — ENCOUNTER SYMPTOMS
SHORTNESS OF BREATH: 0
COLOR CHANGE: 0
VOMITING: 0
WHEEZING: 0
EYE REDNESS: 0
EYE PAIN: 0
ABDOMINAL PAIN: 1
NAUSEA: 0
BACK PAIN: 0
COUGH: 0

## 2018-10-22 NOTE — PROGRESS NOTES
Review of Systems   Constitutional: Negative for activity change, chills and fever. Eyes: Negative for pain, redness and visual disturbance. Respiratory: Negative for cough, shortness of breath and wheezing. Cardiovascular: Negative for chest pain and leg swelling. Gastrointestinal: Positive for abdominal pain (left lower intermittent). Negative for nausea and vomiting. Genitourinary: Negative for difficulty urinating, discharge, dysuria, flank pain, frequency, hematuria, scrotal swelling and testicular pain. Musculoskeletal: Negative for back pain, joint swelling and myalgias. Skin: Negative for color change and rash. Neurological: Negative for dizziness, tremors and numbness. Hematological: Negative for adenopathy. Does not bruise/bleed easily.
STONE/ ESWL Left 1/10/2018    ESWL EXTRACORPEAL SHOCK WAVE LITHOTRIPSY, POSSIBLE  STENT PLACEMENT ([facility] CONF# [id number]) performed by Jesus Pierson MD at [address] PRE-MALIGNANT / [address] Left 6/9/2014    Excision lesion of nose. Dr. Jay Ortiz.     SHOULDER SURGERY Right     closed manipulation    SHOULDER SURGERY Left     open manipulation    SKIN BIOPSY  11/12/2007    meir acilla and neck--squamous papillomas--lft buttock--subcutaneous abscess, back--compound nevus, lft neck--lentigo simplex, lft chest --junctional nevus, rt forearm--blue nevus    SKIN BIOPSY  12/9/2009    lft thigh--follicular cyst    SKIN BIOPSY  2/10/2010    rt thigh--ruptured epidermal inclusion cyst    TONSILLECTOMY      URETER STENT PLACEMENT Left 09/30/15    tiems 2 stents     Family History   Problem Relation Age of Onset    Heart Disease Mother     Diabetes Mother     Heart Disease Father     Diabetes Sister     Diabetes Maternal Grandmother      Outpatient Prescriptions Marked as Taking for the 10/22/18 encounter (Office Visit) with Jesus Pierson MD   Medication Sig Dispense Refill    VASCEPA 1 g CAPS capsule Take 1 capsule by mouth daily      aspirin 81 MG chewable tablet Take 1 tablet by mouth daily Hold for 5 days after surgery, until 1/16/18 30 tablet 0    Dapagliflozin Propanediol (FARXIGA PO) Take by mouth daily      Fenofibrate (TRICOR PO) Take 145 mg by mouth      TOUJEO SOLOSTAR 300 UNIT/ML injection pen Inject 58 Units into the skin every morning       VENTOLIN  (90 BASE) MCG/ACT inhaler inhale 2 puff every 4 hours prn  0    JANUMET XR  MG TB24 tablet Take 2 tablets by mouth daily      B-D UF III MINI PEN NEEDLES 31G X 5 MM MISC       lovastatin (MEVACOR) 20 MG tablet Take 20 mg by mouth daily       LOSARTAN POTASSIUM PO Take 50 mg by mouth daily       GLIMEPIRIDE PO Take 8 mg by mouth daily          Seasonal  History   Smoking Status    Never Smoker   Smokeless

## 2018-10-30 ENCOUNTER — TELEPHONE (OUTPATIENT)
Dept: UROLOGY | Age: 63
End: 2018-10-30

## 2018-10-30 ENCOUNTER — HOSPITAL ENCOUNTER (OUTPATIENT)
Age: 63
Discharge: HOME | End: 2018-11-01
Payer: COMMERCIAL

## 2018-10-30 ENCOUNTER — HOSPITAL ENCOUNTER (OUTPATIENT)
Dept: GENERAL RADIOLOGY | Age: 63
Discharge: HOME | End: 2018-11-01
Payer: COMMERCIAL

## 2018-10-30 DIAGNOSIS — R20.2 PARESTHESIA: ICD-10-CM

## 2018-10-30 DIAGNOSIS — Z01.818 PRE-OP TESTING: Primary | ICD-10-CM

## 2018-10-30 PROCEDURE — 73502 X-RAY EXAM HIP UNI 2-3 VIEWS: CPT

## 2018-11-05 ENCOUNTER — HOSPITAL ENCOUNTER (OUTPATIENT)
Dept: MRI IMAGING | Age: 63
Discharge: HOME | End: 2018-11-07
Payer: COMMERCIAL

## 2018-11-05 DIAGNOSIS — R20.2 PARESTHESIA OF SKIN: ICD-10-CM

## 2018-11-05 PROCEDURE — 72148 MRI LUMBAR SPINE W/O DYE: CPT

## 2018-12-08 ENCOUNTER — HOSPITAL ENCOUNTER (OUTPATIENT)
Age: 63
Discharge: HOME | End: 2018-12-08
Payer: COMMERCIAL

## 2018-12-08 LAB
EKG ATRIAL RATE: 75 BPM
EKG P AXIS: 29 DEGREES
EKG P-R INTERVAL: 192 MS
EKG Q-T INTERVAL: 392 MS
EKG QRS DURATION: 98 MS
EKG QTC CALCULATION (BAZETT): 437 MS
EKG R AXIS: 89 DEGREES
EKG T AXIS: 63 DEGREES
EKG VENTRICULAR RATE: 75 BPM

## 2018-12-08 PROCEDURE — 93005 ELECTROCARDIOGRAM TRACING: CPT

## 2018-12-23 ENCOUNTER — HOSPITAL ENCOUNTER (OUTPATIENT)
Age: 63
Discharge: HOME | End: 2018-12-23
Payer: COMMERCIAL

## 2018-12-23 LAB
ABSOLUTE EOS #: 0.3 K/UL (ref 0–0.4)
ABSOLUTE IMMATURE GRANULOCYTE: ABNORMAL K/UL (ref 0–0.3)
ABSOLUTE LYMPH #: 1.35 K/UL (ref 1–4.8)
ABSOLUTE MONO #: 0.6 K/UL (ref 0.1–1.3)
ALBUMIN SERPL-MCNC: 4.4 G/DL (ref 3.5–5.2)
ALBUMIN/GLOBULIN RATIO: ABNORMAL (ref 1–2.5)
ALP BLD-CCNC: 42 U/L (ref 40–129)
ALT SERPL-CCNC: 25 U/L (ref 5–41)
ANION GAP SERPL CALCULATED.3IONS-SCNC: 10 MMOL/L (ref 9–17)
AST SERPL-CCNC: 18 U/L
BASOPHILS # BLD: 0 % (ref 0–2)
BASOPHILS ABSOLUTE: 0 K/UL (ref 0–0.2)
BILIRUB SERPL-MCNC: 0.55 MG/DL (ref 0.3–1.2)
BUN BLDV-MCNC: 15 MG/DL (ref 8–23)
BUN/CREAT BLD: ABNORMAL (ref 9–20)
CALCIUM SERPL-MCNC: 9.3 MG/DL (ref 8.6–10.4)
CHLORIDE BLD-SCNC: 107 MMOL/L (ref 98–107)
CHOLESTEROL/HDL RATIO: 5
CHOLESTEROL: 114 MG/DL
CO2: 25 MMOL/L (ref 20–31)
CREAT SERPL-MCNC: 0.83 MG/DL (ref 0.7–1.2)
DIFFERENTIAL TYPE: ABNORMAL
EOSINOPHILS RELATIVE PERCENT: 4 % (ref 0–4)
GFR AFRICAN AMERICAN: >60 ML/MIN
GFR NON-AFRICAN AMERICAN: >60 ML/MIN
GFR SERPL CREATININE-BSD FRML MDRD: ABNORMAL ML/MIN/{1.73_M2}
GFR SERPL CREATININE-BSD FRML MDRD: ABNORMAL ML/MIN/{1.73_M2}
GLUCOSE BLD-MCNC: 171 MG/DL (ref 70–99)
HCT VFR BLD CALC: 49.1 % (ref 41–53)
HDLC SERPL-MCNC: 23 MG/DL
HEMOGLOBIN: 16.5 G/DL (ref 13.5–17.5)
IMMATURE GRANULOCYTES: ABNORMAL %
LDL CHOLESTEROL: 32 MG/DL (ref 0–130)
LYMPHOCYTES # BLD: 18 % (ref 24–44)
MCH RBC QN AUTO: 30.3 PG (ref 26–34)
MCHC RBC AUTO-ENTMCNC: 33.6 G/DL (ref 31–37)
MCV RBC AUTO: 90.4 FL (ref 80–100)
MONOCYTES # BLD: 8 % (ref 1–7)
MORPHOLOGY: NORMAL
NRBC AUTOMATED: ABNORMAL PER 100 WBC
PDW BLD-RTO: 13.5 % (ref 11.5–14.9)
PLATELET # BLD: 185 K/UL (ref 150–450)
PLATELET ESTIMATE: ABNORMAL
PMV BLD AUTO: 9.3 FL (ref 6–12)
POTASSIUM SERPL-SCNC: 4.6 MMOL/L (ref 3.7–5.3)
RBC # BLD: 5.43 M/UL (ref 4.5–5.9)
RBC # BLD: ABNORMAL 10*6/UL
SEG NEUTROPHILS: 70 % (ref 36–66)
SEGMENTED NEUTROPHILS ABSOLUTE COUNT: 5.25 K/UL (ref 1.3–9.1)
SODIUM BLD-SCNC: 142 MMOL/L (ref 135–144)
TOTAL PROTEIN: 7.3 G/DL (ref 6.4–8.3)
TRIGL SERPL-MCNC: 295 MG/DL
VLDLC SERPL CALC-MCNC: ABNORMAL MG/DL (ref 1–30)
WBC # BLD: 7.5 K/UL (ref 3.5–11)
WBC # BLD: ABNORMAL 10*3/UL

## 2018-12-23 PROCEDURE — 80061 LIPID PANEL: CPT

## 2018-12-23 PROCEDURE — 80053 COMPREHEN METABOLIC PANEL: CPT

## 2018-12-23 PROCEDURE — 36415 COLL VENOUS BLD VENIPUNCTURE: CPT

## 2018-12-23 PROCEDURE — 83036 HEMOGLOBIN GLYCOSYLATED A1C: CPT

## 2018-12-23 PROCEDURE — 85025 COMPLETE CBC W/AUTO DIFF WBC: CPT

## 2018-12-24 LAB
ESTIMATED AVERAGE GLUCOSE: 137 MG/DL
HBA1C MFR BLD: 6.4 % (ref 4–6)

## 2018-12-31 ENCOUNTER — TELEPHONE (OUTPATIENT)
Dept: UROLOGY | Age: 63
End: 2018-12-31

## 2018-12-31 NOTE — TELEPHONE ENCOUNTER
(Hammad) DEEPTHI @ [facility] 1/16/19 pm **STOP BLOOD THINNERS 1/19/19**  PAT done           Spoke with patient procedure info, emailed 12/31/18.

## 2019-02-21 ENCOUNTER — HOSPITAL ENCOUNTER (EMERGENCY)
Age: 64
Discharge: HOME | End: 2019-02-21
Attending: EMERGENCY MEDICINE
Payer: COMMERCIAL

## 2019-02-21 ENCOUNTER — APPOINTMENT (OUTPATIENT)
Dept: CT IMAGING | Age: 64
End: 2019-02-21
Payer: COMMERCIAL

## 2019-02-21 ENCOUNTER — TELEPHONE (OUTPATIENT)
Dept: UROLOGY | Age: 64
End: 2019-02-21

## 2019-02-21 VITALS
OXYGEN SATURATION: 97 % | HEART RATE: 82 BPM | SYSTOLIC BLOOD PRESSURE: 168 MMHG | TEMPERATURE: 97.3 F | BODY MASS INDEX: 31.29 KG/M2 | DIASTOLIC BLOOD PRESSURE: 82 MMHG | HEIGHT: 72 IN | RESPIRATION RATE: 18 BRPM | WEIGHT: 231 LBS

## 2019-02-21 DIAGNOSIS — N23 RENAL COLIC: Primary | ICD-10-CM

## 2019-02-21 LAB
-: ABNORMAL
ABSOLUTE BANDS #: 0.39 K/UL (ref 0–1)
ABSOLUTE EOS #: 0.26 K/UL (ref 0–0.4)
ABSOLUTE IMMATURE GRANULOCYTE: ABNORMAL K/UL (ref 0–0.3)
ABSOLUTE LYMPH #: 2.19 K/UL (ref 1–4.8)
ABSOLUTE MONO #: 1.29 K/UL (ref 0.1–1.3)
ALBUMIN SERPL-MCNC: 4.7 G/DL (ref 3.5–5.2)
ALBUMIN/GLOBULIN RATIO: ABNORMAL (ref 1–2.5)
ALP BLD-CCNC: 43 U/L (ref 40–129)
ALT SERPL-CCNC: 28 U/L (ref 5–41)
AMORPHOUS: ABNORMAL
ANION GAP SERPL CALCULATED.3IONS-SCNC: 13 MMOL/L (ref 9–17)
AST SERPL-CCNC: 20 U/L
BACTERIA: ABNORMAL
BANDS: 3 % (ref 0–10)
BASOPHILS # BLD: 0 % (ref 0–2)
BASOPHILS ABSOLUTE: 0 K/UL (ref 0–0.2)
BILIRUB SERPL-MCNC: 0.4 MG/DL (ref 0.3–1.2)
BILIRUBIN URINE: NEGATIVE
BUN BLDV-MCNC: 19 MG/DL (ref 8–23)
BUN/CREAT BLD: ABNORMAL (ref 9–20)
CALCIUM SERPL-MCNC: 9.7 MG/DL (ref 8.6–10.4)
CASTS UA: ABNORMAL /LPF
CHLORIDE BLD-SCNC: 107 MMOL/L (ref 98–107)
CO2: 23 MMOL/L (ref 20–31)
COLOR: YELLOW
COMMENT UA: ABNORMAL
CREAT SERPL-MCNC: 1.29 MG/DL (ref 0.7–1.2)
CRYSTALS, UA: ABNORMAL /HPF
DIFFERENTIAL TYPE: ABNORMAL
EOSINOPHILS RELATIVE PERCENT: 2 % (ref 0–4)
EPITHELIAL CELLS UA: ABNORMAL /HPF
GFR AFRICAN AMERICAN: >60 ML/MIN
GFR NON-AFRICAN AMERICAN: 56 ML/MIN
GFR SERPL CREATININE-BSD FRML MDRD: ABNORMAL ML/MIN/{1.73_M2}
GFR SERPL CREATININE-BSD FRML MDRD: ABNORMAL ML/MIN/{1.73_M2}
GLUCOSE BLD-MCNC: 128 MG/DL (ref 70–99)
GLUCOSE URINE: ABNORMAL
HCT VFR BLD CALC: 51.1 % (ref 41–53)
HEMOGLOBIN: 16.9 G/DL (ref 13.5–17.5)
IMMATURE GRANULOCYTES: ABNORMAL %
KETONES, URINE: NEGATIVE
LEUKOCYTE ESTERASE, URINE: NEGATIVE
LIPASE: 49 U/L (ref 13–60)
LYMPHOCYTES # BLD: 17 % (ref 24–44)
MCH RBC QN AUTO: 29.8 PG (ref 26–34)
MCHC RBC AUTO-ENTMCNC: 33 G/DL (ref 31–37)
MCV RBC AUTO: 90.1 FL (ref 80–100)
MONOCYTES # BLD: 10 % (ref 1–7)
MORPHOLOGY: NORMAL
MUCUS: ABNORMAL
NITRITE, URINE: NEGATIVE
NRBC AUTOMATED: ABNORMAL PER 100 WBC
OTHER OBSERVATIONS UA: ABNORMAL
PDW BLD-RTO: 13.2 % (ref 11.5–14.9)
PH UA: 5 (ref 5–8)
PLATELET # BLD: 220 K/UL (ref 150–450)
PLATELET ESTIMATE: ABNORMAL
PMV BLD AUTO: 8.8 FL (ref 6–12)
POTASSIUM SERPL-SCNC: 3.8 MMOL/L (ref 3.7–5.3)
PROTEIN UA: ABNORMAL
RBC # BLD: 5.67 M/UL (ref 4.5–5.9)
RBC # BLD: ABNORMAL 10*6/UL
RBC UA: ABNORMAL /HPF
RENAL EPITHELIAL, UA: ABNORMAL /HPF
SEG NEUTROPHILS: 68 % (ref 36–66)
SEGMENTED NEUTROPHILS ABSOLUTE COUNT: 8.77 K/UL (ref 1.3–9.1)
SODIUM BLD-SCNC: 143 MMOL/L (ref 135–144)
SPECIFIC GRAVITY UA: 1.02 (ref 1–1.03)
TOTAL PROTEIN: 7.8 G/DL (ref 6.4–8.3)
TRICHOMONAS: ABNORMAL
TURBIDITY: CLEAR
URINE HGB: ABNORMAL
UROBILINOGEN, URINE: NORMAL
WBC # BLD: 12.9 K/UL (ref 3.5–11)
WBC # BLD: ABNORMAL 10*3/UL
WBC UA: ABNORMAL /HPF
YEAST: ABNORMAL

## 2019-02-21 PROCEDURE — 6360000002 HC RX W HCPCS: Performed by: EMERGENCY MEDICINE

## 2019-02-21 PROCEDURE — 36415 COLL VENOUS BLD VENIPUNCTURE: CPT

## 2019-02-21 PROCEDURE — 96375 TX/PRO/DX INJ NEW DRUG ADDON: CPT

## 2019-02-21 PROCEDURE — 80053 COMPREHEN METABOLIC PANEL: CPT

## 2019-02-21 PROCEDURE — 99284 EMERGENCY DEPT VISIT MOD MDM: CPT

## 2019-02-21 PROCEDURE — 81001 URINALYSIS AUTO W/SCOPE: CPT

## 2019-02-21 PROCEDURE — 96374 THER/PROPH/DIAG INJ IV PUSH: CPT

## 2019-02-21 PROCEDURE — 85025 COMPLETE CBC W/AUTO DIFF WBC: CPT

## 2019-02-21 PROCEDURE — 83690 ASSAY OF LIPASE: CPT

## 2019-02-21 PROCEDURE — 74176 CT ABD & PELVIS W/O CONTRAST: CPT

## 2019-02-21 PROCEDURE — 2580000003 HC RX 258: Performed by: EMERGENCY MEDICINE

## 2019-02-21 RX ORDER — 0.9 % SODIUM CHLORIDE 0.9 %
1000 INTRAVENOUS SOLUTION INTRAVENOUS ONCE
Status: COMPLETED | OUTPATIENT
Start: 2019-02-21 | End: 2019-02-21

## 2019-02-21 RX ORDER — HYDROCODONE BITARTRATE AND ACETAMINOPHEN 5; 325 MG/1; MG/1
1 TABLET ORAL EVERY 6 HOURS PRN
Qty: 12 TABLET | Refills: 0 | Status: SHIPPED | OUTPATIENT
Start: 2019-02-21 | End: 2019-02-24

## 2019-02-21 RX ORDER — KETOROLAC TROMETHAMINE 30 MG/ML
15 INJECTION, SOLUTION INTRAMUSCULAR; INTRAVENOUS ONCE
Status: COMPLETED | OUTPATIENT
Start: 2019-02-21 | End: 2019-02-21

## 2019-02-21 RX ORDER — ONDANSETRON 2 MG/ML
4 INJECTION INTRAMUSCULAR; INTRAVENOUS ONCE
Status: COMPLETED | OUTPATIENT
Start: 2019-02-21 | End: 2019-02-21

## 2019-02-21 RX ORDER — TAMSULOSIN HYDROCHLORIDE 0.4 MG/1
0.4 CAPSULE ORAL DAILY
Qty: 7 CAPSULE | Refills: 0 | Status: SHIPPED | OUTPATIENT
Start: 2019-02-21 | End: 2019-06-14

## 2019-02-21 RX ORDER — MORPHINE SULFATE 4 MG/ML
4 INJECTION, SOLUTION INTRAMUSCULAR; INTRAVENOUS ONCE
Status: COMPLETED | OUTPATIENT
Start: 2019-02-21 | End: 2019-02-21

## 2019-02-21 RX ADMIN — ONDANSETRON 4 MG: 2 INJECTION INTRAMUSCULAR; INTRAVENOUS at 13:46

## 2019-02-21 RX ADMIN — MORPHINE SULFATE 4 MG: 4 INJECTION INTRAVENOUS at 13:45

## 2019-02-21 RX ADMIN — SODIUM CHLORIDE 1000 ML: 9 INJECTION, SOLUTION INTRAVENOUS at 13:45

## 2019-02-21 RX ADMIN — KETOROLAC TROMETHAMINE 15 MG: 30 INJECTION, SOLUTION INTRAMUSCULAR at 13:45

## 2019-02-21 ASSESSMENT — ENCOUNTER SYMPTOMS
RESPIRATORY NEGATIVE: 1
BACK PAIN: 0
COUGH: 0
EYES NEGATIVE: 1
ABDOMINAL PAIN: 1
SHORTNESS OF BREATH: 0

## 2019-02-21 ASSESSMENT — PAIN SCALES - GENERAL
PAINLEVEL_OUTOF10: 7
PAINLEVEL_OUTOF10: 6

## 2019-03-13 ENCOUNTER — TELEPHONE (OUTPATIENT)
Dept: UROLOGY | Age: 64
End: 2019-03-13

## 2019-03-13 DIAGNOSIS — N20.0 KIDNEY STONES: Primary | ICD-10-CM

## 2019-06-14 ENCOUNTER — OFFICE VISIT (OUTPATIENT)
Dept: PRIMARY CARE CLINIC | Age: 64
End: 2019-06-14
Payer: COMMERCIAL

## 2019-06-14 VITALS
SYSTOLIC BLOOD PRESSURE: 126 MMHG | OXYGEN SATURATION: 98 % | HEART RATE: 86 BPM | HEIGHT: 70 IN | BODY MASS INDEX: 33.24 KG/M2 | DIASTOLIC BLOOD PRESSURE: 70 MMHG | WEIGHT: 232.2 LBS

## 2019-06-14 DIAGNOSIS — Z23 IMMUNIZATION DUE: ICD-10-CM

## 2019-06-14 DIAGNOSIS — I10 ESSENTIAL HYPERTENSION: Primary | ICD-10-CM

## 2019-06-14 DIAGNOSIS — Z79.4 TYPE 2 DIABETES MELLITUS WITHOUT COMPLICATION, WITH LONG-TERM CURRENT USE OF INSULIN (HCC): ICD-10-CM

## 2019-06-14 DIAGNOSIS — E11.9 TYPE 2 DIABETES MELLITUS WITHOUT COMPLICATION, WITH LONG-TERM CURRENT USE OF INSULIN (HCC): ICD-10-CM

## 2019-06-14 PROBLEM — M51.9 DISORDER OF INTERVERTEBRAL DISC OF LUMBAR SPINE: Status: ACTIVE | Noted: 2018-11-21

## 2019-06-14 PROBLEM — G89.29 CHRONIC LOW BACK PAIN: Status: ACTIVE | Noted: 2018-11-21

## 2019-06-14 PROBLEM — M54.50 CHRONIC LOW BACK PAIN: Status: ACTIVE | Noted: 2018-11-21

## 2019-06-14 PROCEDURE — 90471 IMMUNIZATION ADMIN: CPT | Performed by: FAMILY MEDICINE

## 2019-06-14 PROCEDURE — 99203 OFFICE O/P NEW LOW 30 MIN: CPT | Performed by: FAMILY MEDICINE

## 2019-06-14 PROCEDURE — 90670 PCV13 VACCINE IM: CPT | Performed by: FAMILY MEDICINE

## 2019-06-14 RX ORDER — LOSARTAN POTASSIUM 50 MG/1
TABLET ORAL
COMMUNITY
Start: 2019-05-22 | End: 2019-06-14 | Stop reason: SDUPTHER

## 2019-06-14 RX ORDER — ICOSAPENT ETHYL 1000 MG/1
2 CAPSULE ORAL DAILY
Qty: 180 CAPSULE | Refills: 3 | Status: SHIPPED | OUTPATIENT
Start: 2019-06-14 | End: 2020-09-18

## 2019-06-14 RX ORDER — FENOFIBRATE 145 MG/1
TABLET, COATED ORAL
COMMUNITY
Start: 2019-05-28 | End: 2019-06-14

## 2019-06-14 RX ORDER — GLIMEPIRIDE 4 MG/1
4 TABLET ORAL DAILY
COMMUNITY
Start: 2019-04-27

## 2019-06-14 RX ORDER — LOSARTAN POTASSIUM 50 MG/1
50 TABLET ORAL DAILY
Qty: 90 TABLET | Refills: 3 | Status: SHIPPED | OUTPATIENT
Start: 2019-06-14 | End: 2020-09-18

## 2019-06-14 RX ORDER — DAPAGLIFLOZIN 5 MG/1
10 TABLET, FILM COATED ORAL EVERY MORNING
COMMUNITY
Start: 2019-04-21

## 2019-06-14 RX ORDER — MONTELUKAST SODIUM 10 MG/1
10 TABLET ORAL NIGHTLY
Qty: 90 TABLET | Refills: 3 | Status: SHIPPED | OUTPATIENT
Start: 2019-06-14 | End: 2019-08-05 | Stop reason: ALTCHOICE

## 2019-06-14 RX ORDER — MONTELUKAST SODIUM 10 MG/1
10 TABLET ORAL NIGHTLY
COMMUNITY
Start: 2019-04-22 | End: 2019-06-14 | Stop reason: SDUPTHER

## 2019-06-14 RX ORDER — INSULIN GLARGINE 100 [IU]/ML
70 INJECTION, SOLUTION SUBCUTANEOUS DAILY
COMMUNITY
Start: 2019-06-02

## 2019-06-14 RX ORDER — LOVASTATIN 20 MG/1
20 TABLET ORAL NIGHTLY
Qty: 90 TABLET | Refills: 3 | Status: SHIPPED | OUTPATIENT
Start: 2019-06-14 | End: 2020-04-13 | Stop reason: SDUPTHER

## 2019-06-14 ASSESSMENT — PATIENT HEALTH QUESTIONNAIRE - PHQ9
SUM OF ALL RESPONSES TO PHQ9 QUESTIONS 1 & 2: 0
SUM OF ALL RESPONSES TO PHQ QUESTIONS 1-9: 0
SUM OF ALL RESPONSES TO PHQ QUESTIONS 1-9: 0
1. LITTLE INTEREST OR PLEASURE IN DOING THINGS: 0
2. FEELING DOWN, DEPRESSED OR HOPELESS: 0

## 2019-06-14 ASSESSMENT — ENCOUNTER SYMPTOMS: SHORTNESS OF BREATH: 1

## 2019-06-14 NOTE — PATIENT INSTRUCTIONS
Patient Education        pneumococcal 13-valent conjugate vaccine  Pronunciation:  CARLOS A basilio RYLEE al 13-VAY lent OSBALDO anne VAX een  Brand:  Prevnar 15  What is the most important information I should know about this vaccine? For children, the pneumococcal 13-valent vaccine is given in a series of shots. The first shot is usually given when the child is 3 months old. The booster shots are then given at 4 months, 6 months, and 15to 13months of age. Adults usually receive only one dose of the vaccine. In a child older than 6 months who has not yet received this vaccine, the first dose can be given any time from the age of 10 months through 11 years (before the 7th birthday). If the child is less than 3year old at the time of the first shot, he or she will need 2 booster doses. If the child is 15 to 22 months old at the time of the first shot, he or she will need 1 booster dose. A child who is 2 years or older at the time of the first shot may need only the one shot and no booster doses. The timing of this vaccination is very important for it to be effective. Your child's individual booster schedule may be different from these guidelines. Follow your doctor's instructions or the schedule recommended by the health department of the state you live in. Keep track of any and all side effects your child has after receiving this vaccine. When the child receives a booster dose, you will need to tell the doctor if the previous shot caused any side effects. You can still receive a vaccine if you have a minor cold. In the case of a more severe illness with a fever or any type of infection, wait until you get better before receiving this vaccine. Becoming infected with pneumococcal disease (such as pneumonia or meningitis) is much more dangerous to your health than receiving this vaccine. However, like any medicine, this vaccine can cause side effects but the risk of serious side effects is extremely low.   Be sure to keep your child on a regular schedule for other immunizations against diseases such as diphtheria, tetanus, pertussis (whooping cough), measles, mumps, hepatitis, or varicella (chicken pox). Your doctor or state health department can provide you with a recommended immunization schedule. What is pneumococcal 13-valent conjugate vaccine? Pneumococcal disease is a serious infection caused by a bacteria. Pneumococcal bacteria can infect the sinuses and inner ear. It can also infect the lungs, blood, and brain, and these conditions can be fatal.  Pneumococcal 13-valent vaccine is used to prevent infection caused by pneumococcal bacteria. This vaccine contains 13 different types of pneumococcal bacteria. Pneumococcal 13-valent vaccine works by exposing you to a small amount of the bacteria or a protein from the bacteria, which causes the body to develop immunity to the disease. This vaccine will not treat an active infection that has already developed in the body. Pneumococcal 13-valent vaccine is for use in children from 6 weeks to 11years old, and in adults who are 48 and older. Becoming infected with pneumococcal disease (such as pneumonia or meningitis) is much more dangerous to your health than receiving this vaccine. However, like any medicine, this vaccine can cause side effects but the risk of serious side effects is extremely low. Like any vaccine, pneumococcal 13-valent vaccine may not provide protection from disease in every person. What should I discuss with my healthcare provider before receiving this vaccine? Keep track of any and all side effects your child has after receiving this vaccine. When the child receives a booster dose, you will need to tell the doctor if the previous shot caused any side effects. You should not receive this vaccine if you ever had a severe allergic reaction to a pneumococcal or diphtheria vaccine.   Before your child receives this vaccine, tell your doctor if the child was born prematurely. To make sure you or your child can safely receive this vaccine, tell your doctor if you or your child have any of these other conditions:  · a bleeding or blood clotting disorder such as hemophilia or easy bruising; or  · a weak immune system caused by disease, bone marrow transplant, or by using certain medicines or receiving cancer treatments. You can still receive a vaccine if you have a minor cold. In the case of a more severe illness with a fever or any type of infection, wait until you get better before receiving this vaccine. How is this vaccine given? This vaccine is injected into a muscle. You will receive this injection in a doctor's office or clinic setting. For children, the pneumococcal 13-valent vaccine is given in a series of shots. The first shot is usually given when the child is 3 months old. The booster shots are then given at 4 months, 6 months, and 15to 13months of age. Adults usually receive only one dose of the vaccine. The first injection should be given no earlier than 10weeks of age. Allow at least 2 months to pass between injections. If your child is older than 6 months, he or she can still receive this vaccine on the following schedule:  · Age 7-11 months: two injections at least 4 weeks apart, followed by a third injection after the child turns 1 year (at least 2 months after the second injection); · Age 12-23 months: two injections at least 2 months apart;  · Age 19 months to 5 years (before the 7th birthday): one injection. The timing of this vaccination is very important for it to be effective. Your child's individual booster schedule may be different from these guidelines. Follow your doctor's instructions or the schedule recommended by the health department of the state you live in.   Your doctor may recommend treating fever and pain with an aspirin-free pain reliever such as acetaminophen (Tylenol) or ibuprofen (Motrin, Advil, and others) when the shot is given and for the next 24 hours. Follow the label directions or your doctor's instructions about how much of this medicine to give your child. It is especially important to prevent fever from occurring in a child who has a seizure disorder such as epilepsy. Be sure to keep your child on a regular schedule for other immunizations such as diphtheria, tetanus, pertussis (whooping cough), hepatitis, and varicella (chicken pox). Your doctor or state health department can provide you with a recommended immunization schedule. What happens if I miss a dose? Contact your doctor if your child will miss a booster dose or gets behind schedule. The next dose should be given as soon as possible. There is no need to start over. Be sure your child receives all recommended doses of this vaccine. If your child does not receive the full series of vaccines, he or she may not be fully protected against the disease. What happens if I overdose? An overdose of this vaccine is unlikely to occur. What should I avoid before or after receiving this vaccine? Follow your doctor's instructions about any restrictions on food, beverages, or activity. What are the possible side effects of this vaccine? Your child should not receive a booster vaccine if he or she had a life-threatening allergic reaction after the first shot. Keep track of any and all side effects your child has after receiving this vaccine. When the child receives a booster dose, you will need to tell the doctor if the previous shot caused any side effects. Get emergency medical help if your child has any of these signs of an allergic reaction: hives; difficulty breathing; swelling of the face, lips, tongue, or throat.   Call your doctor at once if you or your child has a serious side effect such as:  · high fever (103 degrees or higher);  · seizure (convulsions);  · wheezing, trouble breathing;  · severe stomach pain, severe vomiting or diarrhea;  · easy bruising or bleeding; or  · severe pain, itching, irritation, or skin changes where the shot was given. Less serious side effects include  · crying, fussiness;  · headache, tired feeling;  · muscle or joint pain;  · drowsiness, sleeping more or less than usual;  · mild redness, swelling, tenderness, or a hard lump where the shot was given;  · loss of appetite, mild vomiting or diarrhea;  · low fever (102 degrees or less), chills; or  · mild skin rash. This is not a complete list of side effects and others may occur. Call your doctor for medical advice about side effects. You may report vaccine side effects to the [address] [place] [address] [address] and Human Services at [phone]. What other drugs will affect this vaccine? Before receiving this vaccine, tell the doctor about all other vaccines you or your child have recently received. Also tell the doctor if you or your child have recently received drugs or treatments that can weaken the immune system, including:  · an oral, nasal, inhaled, or injectable steroid medicine;  · chemotherapy or radiation;  · medications to treat psoriasis, rheumatoid arthritis, or other autoimmune disorders, such as azathioprine (Imuran), etanercept (Enbrel), leflunomide ([address]), and others; or  · medicines to treat or prevent organ transplant rejection, such as basiliximab (Simulect), cyclosporine (Sandimmune, Neoral, Gengraf), muromonab CD3 (Orthoclone), mycophenolate mofetil (CellCept), sirolimus (Rapamune), or tacrolimus (Prograf). If you are using any of these medications, you may not be able to receive the vaccine, or may need to wait until the other treatments are finished. There may be other drugs that can interact with pneumococcal 13-valent vaccine. Tell your doctor about all medications you use. This includes prescription, over-the-counter, vitamin, and herbal products. Do not start a new medication without telling your doctor. Where can I get more information?   Your doctor or pharmacist can provide more information about this vaccine. Additional information is available from your local health department or the Centers for Disease Control and Prevention. Remember, keep this and all other medicines out of the reach of children, never share your medicines with others, and use this medication only for the indication prescribed. Every effort has been made to ensure that the information provided by [address] is accurate, up-to-date, and complete, but no guarantee is made to that effect. Drug information contained herein may be time sensitive. [facility] information has been compiled for use by healthcare practitioners and consumers in the [place] and therefore [facility] does not warrant that uses outside of the [place] are appropriate, unless specifically indicated otherwise. [facility]'s drug information does not endorse drugs, diagnose patients or recommend therapy. [facility]'s drug information is an informational resource designed to assist licensed healthcare practitioners in caring for their patients and/or to serve consumers viewing this service as a supplement to, and not a substitute for, the expertise, skill, knowledge and judgment of healthcare practitioners. The absence of a warning for a given drug or drug combination in no way should be construed to indicate that the drug or drug combination is safe, effective or appropriate for any given patient. [facility] does not assume any responsibility for any aspect of healthcare administered with the aid of information [facility] provides. The information contained herein is not intended to cover all possible uses, directions, precautions, warnings, drug interactions, allergic reactions, or adverse effects. If you have questions about the drugs you are taking, check with your doctor, nurse or pharmacist.  Copyright 2280-7591 [address] Version: 4.01. Revision date: 1/16/2012.   Care instructions adapted under license by [facility] ([facility]). If you have questions about a medical condition or this instruction, always ask your healthcare professional. [place] [address] any warranty or liability for your use of this information. Patient Education        tetanus, diphtheria, acellular pertussis vaccine (Tdap)  Pronunciation:  ANN MARIE a nus, dif THECOLIN couch a, and carisa márquez per TUS iss  Brand:  Adacel (Tdap), Boostrix (Tdap)  What is the most important information I should know about this vaccine? You should not receive this vaccine if you have ever had had a life-threatening allergic reaction to a tetanus, diphtheria, or pertussis vaccine. You also should not receive this vaccine if you had a neurologic disorder affecting your brain within 7 days after having a previous pertussis vaccine. What is tetanus, diphtheria, acellular pertussis vaccine (Tdap)? Tetanus, diphtheria, and pertussis are serious diseases caused by bacteria. Tetanus (lockjaw) causes painful tightening of the muscles, usually all over the body. It can lead to \"locking\" of the jaw so the victim cannot open the mouth or swallow. Tetanus leads to death in about 1 out of 10 cases. Diphtheria causes a thick coating in the nose, throat, and airways. It can lead to breathing problems, paralysis, heart failure, or death. Pertussis (whooping cough) causes coughing so severe that it interferes with eating, drinking, or breathing. These spells can last for weeks and can lead to pneumonia, seizures (convulsions), brain damage, and death. Diphtheria and pertussis are spread from person to person. Tetanus enters the body through a cut or wound. The diphtheria, tetanus acellular, and pertussis adult vaccine (also called Tdap) is used to help prevent these diseases in people who are at least 8years old. Most people in this age group require only one Tdap shot for protection against these diseases.   Tdap vaccine is especially important for healthcare workers or people who have close contact with a baby younger than 13 months old. This vaccine works by exposing you to a small dose of the bacteria or a protein from the bacteria, which causes the body to develop immunity to the disease. This vaccine will not treat an active infection that has already developed in the body. Like any vaccine, the Tdap vaccine may not provide protection from disease in every person. What should I discuss with my healthcare provider before receiving this vaccine? You should not receive this vaccine if:  · you had a life-threatening allergic reaction to any vaccine that contains tetanus, diphtheria, or pertussis; or  · you had a neurologic disorder affecting your brain (such as loss of consciousness or a prolonged seizure) within 7 days after having a previous pertussis vaccine. You may not be able to receive a Tdap vaccine if you have ever received a similar vaccine that caused any of the following:  · a very high fever (over 104 degrees Fahrenheit);  · a neurologic disorder or disease affecting the brain;  · fainting or going into shock;  · severe pain, redness, tenderness, swelling, or a lump where the shot was given;  · an allergy to latex rubber;  · severe or uncontrolled epilepsy or other seizure disorder; or  · Guillain-Barré syndrome (within 6 weeks after receiving a vaccine containing tetanus). If you have any of these other conditions, your vaccine may need to be postponed or not given at all:  · a history of seizures;  · a weak immune system caused by disease, bone marrow transplant, or by using certain medicines or receiving cancer treatments; or  · if it has been less than 10 years since you last received a tetanus shot. You can still receive a vaccine if you have a minor cold. In the case of a more severe illness with a fever or any type of infection, wait until you get better before receiving this vaccine. It is not known whether Tdap vaccine will harm an unborn baby. However, you may need a Tdap vaccine during pregnancy to protect your  baby from pertussis. [address] [place] [address] babies are most at risk for severe, life-threatening complications from pertussis. Your doctor should determine whether you need this vaccine during pregnancy. If you are pregnant, your name may be listed on a pregnancy registry. This is to track the outcome of the pregnancy and to evaluate any effects of the Tdap vaccine on the baby. It is not known whether Tdap vaccine passes into breast milk or if it could harm a nursing baby. Tell your doctor if you are breast-feeding a baby. The adult version of this vaccine (Adacel, Boostrix) should not be given to anyone under the age of 8. Another vaccine is available for use in children younger than 8years old. How is this vaccine given? This vaccine is given as an injection (shot) into a muscle. You will receive this injection in a doctor's office or clinic setting. Tdap vaccine is usually given as a one-time injection. Unless your doctor's tells you otherwise, you will not need a booster vaccine. Tdap vaccine is usually given once every 10 years. What happens if I miss a dose? Since the Tdap vaccine is usually given only once, you are not likely to miss a dose. What happens if I overdose? An overdose of this vaccine is unlikely to occur. What should I avoid before or after receiving this vaccine? Follow your doctor's instructions about any restrictions on food, beverages, or activity after receiving a Tdap vaccine. What are the possible side effects of this vaccine? Keep track of any and all side effects you have after receiving this vaccine. If you ever need to receive a booster dose, you will need to tell your doctor if the previous shot caused any side effects. You should not receive a booster vaccine if you had a life threatening allergic reaction after the first shot.   Becoming infected with diphtheria, pertussis, or tetanus is much more dangerous to your health than receiving this vaccine. However, like any medicine, this vaccine can cause side effects but the risk of serious side effects is extremely low. Get emergency medical help if you have signs of an allergic reaction: hives; difficult breathing; swelling of your face, lips, tongue, or throat. Call your doctor at once if you have any of these side effects within 7 days after receiving Tdap vaccine:  · numbness, weakness, or tingling in your feet and legs;  · problems with walking or coordination;  · sudden pain in your arms or shoulders;  · a light-headed feeling, like you might pass out;  · vision problems, ringing in your ears;  · seizure (black-out or convulsions); or  · redness, swelling, bleeding, or severe pain where the shot was given. Common side effects may include:  · mild pain or tenderness where the shot was given;  · headache or tiredness;  · body aches; or  · mild nausea, diarrhea, or vomiting. This is not a complete list of side effects and others may occur. Call your doctor for medical advice about side effects. You may report vaccine side effects to the [place] [address] and Human Services at [phone]. What other drugs will affect tetanus, diphtheria, acellular pertussis vaccine? Before receiving this vaccine, tell your doctor about all other vaccines you have recently received. Also tell the doctor if you have recently received drugs or treatments that can weaken the immune system, including:  · an oral, nasal, inhaled, or injectable steroid medicine;  · medications to treat psoriasis, rheumatoid arthritis, or other autoimmune disorders; or  · medicines to treat or prevent organ transplant rejection. If you are using any of these medications, you may not be able to receive the vaccine, or may need to wait until the other treatments are finished. This list is not complete.  Other drugs may interact with this vaccine, including prescription and over-the-counter medicines, vitamins, and herbal products. Not all possible interactions are listed in this medication guide. Where can I get more information? Your doctor or pharmacist can provide more information about this vaccine. Additional information is available from your local health department or the Centers for Disease Control and Prevention. Remember, keep this and all other medicines out of the reach of children, never share your medicines with others, and use this medication only for the indication prescribed. Every effort has been made to ensure that the information provided by [address] is accurate, up-to-date, and complete, but no guarantee is made to that effect. Drug information contained herein may be time sensitive. [facility] information has been compiled for use by healthcare practitioners and consumers in the [place] and therefore [facility] does not warrant that uses outside of the [place] are appropriate, unless specifically indicated otherwise. [facility]'s drug information does not endorse drugs, diagnose patients or recommend therapy. [facility] drug information is an informational resource designed to assist licensed healthcare practitioners in caring for their patients and/or to serve consumers viewing this service as a supplement to, and not a substitute for, the expertise, skill, knowledge and judgment of healthcare practitioners. The absence of a warning for a given drug or drug combination in no way should be construed to indicate that the drug or drug combination is safe, effective or appropriate for any given patient. [facility] does not assume any responsibility for any aspect of healthcare administered with the aid of information [facility] provides. The information contained herein is not intended to cover all possible uses, directions, precautions, warnings, drug interactions, allergic reactions, or adverse effects.  If you have questions about the drugs you are taking, check with your doctor, nurse or pharmacist.  Copyright 4405-1998 [facility] PÉREZ Version: 3.01. Revision date: 8/19/2016. Care instructions adapted under license by [facility] ([facility]). If you have questions about a medical condition or this instruction, always ask your healthcare professional. [address] any warranty or liability for your use of this information. Patient Education        Sacroiliac Pain: Exercises  Your Care Instructions  Here are some examples of typical rehabilitation exercises for your condition. Start each exercise slowly. Ease off the exercise if you start to have pain. Your doctor or physical therapist will tell you when you can start these exercises and which ones will work best for you. How to do the exercises  Knee-to-chest stretch    1. Do not do the knee-to-chest exercise if it causes or increases back or leg pain. 2. Lie on your back with your knees bent and your feet flat on the floor. You can put a small pillow under your head and neck if it is more comfortable. 3. Grasp your hands under one knee and bring the knee to your chest, keeping the other foot flat on the floor. 4. Keep your lower back pressed to the floor. Hold for at least 15 to 30 seconds. 5. Relax and lower the knee to the starting position. Repeat with the other leg. 6. Repeat 2 to 4 times with each leg. 7. To get more stretch, keep your other leg flat on the floor while pulling your knee to your chest.    Bridging    1. Lie on your back with both knees bent. Your knees should be bent about 90 degrees. 2. Tighten your belly muscles by pulling in your belly button toward your spine. Then push your feet into the floor, squeeze your buttocks, and lift your hips off the floor until your shoulders, hips, and knees are all in a straight line.   3. Hold for about 6 seconds as you continue to breathe normally, and then slowly lower your hips back down to the floor and rest for up to instruction, always ask your healthcare professional. [place] [address] any warranty or liability for your use of this information. Patient Education        Piriformis Syndrome: Exercises  Your Care Instructions  Here are some examples of typical rehabilitation exercises for your condition. Start each exercise slowly. Ease off the exercise if you start to have pain. Your doctor or physical therapist will tell you when you can start these exercises and which ones will work best for you. How to do the exercises  Hip rotator stretch    1. Lie on your back with both knees bent and your feet flat on the floor. 2. Put the ankle of your affected leg on your opposite thigh near your knee. 3. Use your hand to gently push your knee (on your affected leg) away from your body until you feel a gentle stretch around your hip. 4. Hold the stretch for 15 to 30 seconds. 5. Repeat 2 to 4 times. 6. Switch legs and repeat steps 1 through 5. Piriformis stretch    1. Lie on your back with your legs straight. 2. Lift your affected leg and bend your knee. With your opposite hand, reach across your body, and then gently pull your knee toward your opposite shoulder. 3. Hold the stretch for 15 to 30 seconds. 4. Repeat with your other leg. 5. Repeat 2 to 4 times on each side. Lower abdominal strengthening    1. Lie on your back with your knees bent and your feet flat on the floor. 2. Tighten your belly muscles by pulling your belly button in toward your spine. 3. Lift one foot off the floor and bring your knee toward your chest, so that your knee is straight above your hip and your leg is bent like the letter \"L. \"  4. Lift the other knee up to the same position. 5. Lower one leg at a time to the starting position. 6. Keep alternating legs until you have lifted each leg 8 to 12 times. 7. Be sure to keep your belly muscles tight and your back still as you are moving your legs.  Be sure to breathe

## 2019-06-18 ENCOUNTER — TELEPHONE (OUTPATIENT)
Dept: UROLOGY | Age: 64
End: 2019-06-18

## 2019-06-19 ENCOUNTER — HOSPITAL ENCOUNTER (OUTPATIENT)
Age: 64
Discharge: HOME | End: 2019-06-21
Payer: COMMERCIAL

## 2019-06-19 ENCOUNTER — HOSPITAL ENCOUNTER (OUTPATIENT)
Dept: GENERAL RADIOLOGY | Age: 64
Discharge: HOME | End: 2019-06-21
Payer: COMMERCIAL

## 2019-06-19 DIAGNOSIS — N20.0 KIDNEY STONES: ICD-10-CM

## 2019-06-19 PROCEDURE — 74018 RADEX ABDOMEN 1 VIEW: CPT

## 2019-06-24 ENCOUNTER — OFFICE VISIT (OUTPATIENT)
Dept: UROLOGY | Age: 64
End: 2019-06-24
Payer: COMMERCIAL

## 2019-06-24 VITALS
SYSTOLIC BLOOD PRESSURE: 122 MMHG | HEART RATE: 80 BPM | WEIGHT: 233 LBS | OXYGEN SATURATION: 96 % | HEIGHT: 70 IN | BODY MASS INDEX: 33.36 KG/M2 | DIASTOLIC BLOOD PRESSURE: 74 MMHG | TEMPERATURE: 98 F

## 2019-06-24 DIAGNOSIS — N20.0 KIDNEY STONES: Primary | ICD-10-CM

## 2019-06-24 DIAGNOSIS — R35.0 URINARY FREQUENCY: ICD-10-CM

## 2019-06-24 DIAGNOSIS — R39.15 URGENCY OF URINATION: ICD-10-CM

## 2019-06-24 PROCEDURE — 99214 OFFICE O/P EST MOD 30 MIN: CPT | Performed by: UROLOGY

## 2019-06-24 ASSESSMENT — ENCOUNTER SYMPTOMS
RESPIRATORY NEGATIVE: 1
ALLERGIC/IMMUNOLOGIC NEGATIVE: 1
EYES NEGATIVE: 1
GASTROINTESTINAL NEGATIVE: 1

## 2019-06-24 NOTE — PROGRESS NOTES
[facility]  [facility] UROLOGY SPECIALISTS - [place]  [address]  [address]  [address] [facility] [address]  Dept: [address] [facility] Urology Office Note - Established    Patient:  Mary Gomez  YOB: 1955  Date: 6/24/2019    The patient is a 59 y.o. male who presents todayfor evaluation of the following problems:   Chief Complaint   Patient presents with    Follow-up     4 months post lithotripsy with KUB       HPI  Kidney calculus:  Patient is here today for a kidney calculus which was first noted several year(s) ago. Location: Bilateral upper and lower pole  Size: multiple mm  Current medical Rx for renal calculus: none  Passed recent calculus? Yes  Stone composition: unknown  Flank pain? no  Hematuria? none     Recent bilateral eswl; still has bilateral stones. Lab Results   Component Value Date    CALCIUM 9.7 02/21/2019    PHOS 3.0 06/20/2012     Lab Results   Component Value Date     02/21/2019    K 3.8 02/21/2019     02/21/2019    CO2 23 02/21/2019         Summary of old records: N/A    Additional History: N/A    Procedures Today: N/A    Urinalysis today:  No results found for this visit on 06/24/19.   Last several PSA's:  Lab Results   Component Value Date    PSA 0.18 04/05/2018    PSA 0.17 12/28/2017    PSA 0.20 08/07/2015     Last total testosterone:  No results found for: TESTOSTERONE    AUA Symptom Score (6/24/2019):                               Last BUN and creatinine:  Lab Results   Component Value Date    BUN 19 02/21/2019     Lab Results   Component Value Date    CREATININE 1.29 (H) 02/21/2019       Additional Lab/Culture results: none    Imaging Reviewed during this Office Visit: bilateral stones  (results were independently reviewed by physician and radiology report verified)    PAST MEDICAL, FAMILY AND SOCIAL HISTORY UPDATE:  Past Medical History:   Diagnosis Date    Asthma     Benign bladder tumor     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, lower lobe    H/O arthroscopy of left knee     H/O seasonal allergies     Hearing aid worn     MICHAEL    Hematuria     Hyperlipidemia     Hypertension     Kidney stone     Left ureteral stone     Neoplasm of unspecified nature of bone, soft tissue, 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 Sabas Cranker Left 9-21-15    2 stents in Lt.  CYSTOSCOPY  09/30/2015    HERNIA REPAIR Left     inguinal    KNEE ARTHROSCOPY      LITHOTRIPSY Left     x2    LITHOTRIPSY  09/30/15    laser    LITHOTRIPSY Left 01/10/2018    LUNG SURGERY Right 5/23/2011    right lower lobe \"lung carcinoid\" removal    RI FRAGMENT KIDNEY STONE/ ESWL Left 1/10/2018    ESWL EXTRACORPEAL SHOCK WAVE LITHOTRIPSY, POSSIBLE  STENT PLACEMENT ([employer] [facility] CONF# [id number]) performed by Macey Cordova MD [facility] / [facility] Left 6/9/2014    Excision lesion of nose. Dr. Suhail Sims.     SHOULDER SURGERY Right     closed manipulation    SHOULDER SURGERY Left     open manipulation    SKIN BIOPSY  11/12/2007    michael acilla and neck--squamous papillomas--lft buttock--subcutaneous abscess, back--compound nevus, lft neck--lentigo simplex, lft chest --junctional nevus, rt forearm--blue nevus    SKIN BIOPSY  12/9/2009    lft thigh--follicular cyst    SKIN BIOPSY  2/10/2010    rt thigh--ruptured epidermal inclusion cyst    TONSILLECTOMY      URETER STENT PLACEMENT Left 09/30/15    tiems 2 stents     Family History   Problem Relation Age of Onset    Heart Disease Mother    [facility] Diabetes Mother     Heart Disease Father     Diabetes Sister     Diabetes Maternal Grandmother      Outpatient Medications Marked as Taking for the 6/24/19 encounter (Office Visit) with Gelacio Montoya MD   Medication Sig Dispense Refill    FARXIGA 5 MG tablet Take 5 mg by mouth every morning       glimepiride (AMARYL) 4 MG tablet Take 4 mg by mouth 2 times daily       BASAGLAR KWIKPEN 100 UNIT/ML injection pen Inject 58 Units into the skin daily       Insulin Pen Needle (B-D UF III MINI PEN NEEDLES) 31G X 5 MM MISC Inject 1 each into the skin daily 100 each 3    losartan (COZAAR) 50 MG tablet Take 1 tablet by mouth daily 90 tablet 3    montelukast (SINGULAIR) 10 MG tablet Take 1 tablet by mouth nightly 90 tablet 3    lovastatin (MEVACOR) 20 MG tablet Take 1 tablet by mouth nightly 90 tablet 3    VASCEPA 1 g CAPS capsule Take 2 capsules by mouth daily 180 capsule 3    aspirin 81 MG chewable tablet Take 1 tablet by mouth daily Hold for 5 days after surgery, until 1/16/18 (Patient taking differently: Take 81 mg by mouth daily ) 30 tablet 0    VENTOLIN  (90 BASE) MCG/ACT inhaler inhale 2 puff every 4 hours prn  0    JANUMET XR  MG TB24 tablet Take 2 tablets by mouth daily         Seasonal  Social History     Tobacco Use   Smoking Status Never Smoker   Smokeless Tobacco Never Used     (Ifpatient a smoker, smoking cessation counseling offered)    Social History     Substance and Sexual Activity   Alcohol Use Yes    Comment: once a week       REVIEW OF SYSTEMS:  Review of Systems    Physical Exam:      Vitals:    06/24/19 0937   BP: 122/74   Pulse: 80   Temp: 98 °F (36.7 °C)   SpO2: 96%     Body mass index is 33.21 kg/m². Patient is a 59 y.o. male in no acute distress and alert and oriented to person, place and time. Physical Exam  Constitutional: Patient in no acute distress. Neuro: Alert and oriented to person, place and time.   Psych: Mood normal, affect normal  Skin: No rash noted  HEENT: Head: Normocephalic andatraumatic  Conjunctivae and EOM are normal. Pupils are equal, round  Nose:Normal  Right External Ear: Normal; Left External Ear: Normal  Mouth: Mucosa Moist  Neck: Supple  Lungs: Respiratory effort is normal  Cardiovascular: Warm & Pink  Abdomen: Soft, non-tender, non-distended with no CVA,  No flank tenderness,  Or hepatosplenomegaly   Lymphatics: No palpablelymphadenopathy. Bladder non-tender and not distended. Assessment and Plan      1. Kidney stones    2. Urgency of urination    3. Urinary frequency           Plan:   Litholink, blood panel  Renal u/s  F/u after above      Return in about 6 weeks (around 8/5/2019). Prescriptions Ordered:  No orders of the defined types were placed in this encounter. Orders Placed:  Orders Placed This Encounter   Procedures    US Renal Kidney     Standing Status:   Future     Standing Expiration Date:   6/18/2020     Order Specific Question:   Reason for exam:     Answer:   kidney stones    Basic Metabolic Panel     Standing Status:   Future     Standing Expiration Date:   6/24/2020    Calcium     Standing Status:   Future     Standing Expiration Date:   6/24/2020   Marina Jono     Standing Status:   Future     Standing Expiration Date:   6/24/2020    PTH, Intact     Standing Status:   Future     Standing Expiration Date:   6/24/2020    Uric Acid     Standing Status:   Future     Standing Expiration Date:   6/24/2020           Nena Ervin MD    Agree with the ROS entered by the MA.

## 2019-06-24 NOTE — PROGRESS NOTES
Review of Systems   Constitutional: Negative. HENT: Negative. Eyes: Negative. Respiratory: Negative. Cardiovascular: Negative. Gastrointestinal: Negative. Endocrine: Negative. Musculoskeletal: Negative. Skin: Negative. Allergic/Immunologic: Negative. Neurological: Negative. Hematological: Negative. Psychiatric/Behavioral: Negative.

## 2019-06-28 ENCOUNTER — HOSPITAL ENCOUNTER (OUTPATIENT)
Dept: ULTRASOUND IMAGING | Age: 64
Discharge: HOME | End: 2019-06-30
Payer: COMMERCIAL

## 2019-06-28 ENCOUNTER — HOSPITAL ENCOUNTER (OUTPATIENT)
Age: 64
Discharge: HOME | End: 2019-06-28
Payer: COMMERCIAL

## 2019-06-28 DIAGNOSIS — N20.0 KIDNEY STONES: ICD-10-CM

## 2019-06-28 LAB
ANION GAP SERPL CALCULATED.3IONS-SCNC: 16 MMOL/L (ref 9–17)
BUN BLDV-MCNC: 20 MG/DL (ref 8–23)
BUN/CREAT BLD: ABNORMAL (ref 9–20)
CALCIUM SERPL-MCNC: 10 MG/DL (ref 8.6–10.4)
CHLORIDE BLD-SCNC: 103 MMOL/L (ref 98–107)
CO2: 24 MMOL/L (ref 20–31)
CREAT SERPL-MCNC: 0.98 MG/DL (ref 0.7–1.2)
GFR AFRICAN AMERICAN: >60 ML/MIN
GFR NON-AFRICAN AMERICAN: >60 ML/MIN
GFR SERPL CREATININE-BSD FRML MDRD: ABNORMAL ML/MIN/{1.73_M2}
GFR SERPL CREATININE-BSD FRML MDRD: ABNORMAL ML/MIN/{1.73_M2}
GLUCOSE BLD-MCNC: 167 MG/DL (ref 70–99)
POTASSIUM SERPL-SCNC: 4.1 MMOL/L (ref 3.7–5.3)
PTH INTACT: 19.9 PG/ML (ref 15–65)
SODIUM BLD-SCNC: 143 MMOL/L (ref 135–144)
URIC ACID: 6.8 MG/DL (ref 3.4–7)

## 2019-06-28 PROCEDURE — 80048 BASIC METABOLIC PNL TOTAL CA: CPT

## 2019-06-28 PROCEDURE — 83970 ASSAY OF PARATHORMONE: CPT

## 2019-06-28 PROCEDURE — 84550 ASSAY OF BLOOD/URIC ACID: CPT

## 2019-06-28 PROCEDURE — 76775 US EXAM ABDO BACK WALL LIM: CPT

## 2019-06-28 PROCEDURE — 36415 COLL VENOUS BLD VENIPUNCTURE: CPT

## 2019-07-12 LAB
AVERAGE GLUCOSE: 260
HBA1C MFR BLD: 7 %

## 2019-08-01 DIAGNOSIS — N20.0 KIDNEY STONES: ICD-10-CM

## 2019-08-05 ENCOUNTER — OFFICE VISIT (OUTPATIENT)
Dept: UROLOGY | Age: 64
End: 2019-08-05
Payer: COMMERCIAL

## 2019-08-05 VITALS
BODY MASS INDEX: 33.08 KG/M2 | HEART RATE: 79 BPM | SYSTOLIC BLOOD PRESSURE: 143 MMHG | HEIGHT: 70 IN | TEMPERATURE: 97.5 F | DIASTOLIC BLOOD PRESSURE: 85 MMHG | WEIGHT: 231.04 LBS

## 2019-08-05 DIAGNOSIS — R82.992 HYPEROXALURIA: ICD-10-CM

## 2019-08-05 DIAGNOSIS — N20.0 KIDNEY STONES: Primary | ICD-10-CM

## 2019-08-05 DIAGNOSIS — Z87.442 HISTORY OF KIDNEY STONES: Primary | ICD-10-CM

## 2019-08-05 DIAGNOSIS — R82.993 HYPERURICOSURIA: ICD-10-CM

## 2019-08-05 PROCEDURE — 99214 OFFICE O/P EST MOD 30 MIN: CPT | Performed by: UROLOGY

## 2019-08-05 ASSESSMENT — ENCOUNTER SYMPTOMS
ABDOMINAL PAIN: 0
CONSTIPATION: 0
NAUSEA: 0
DIARRHEA: 0
EYE PAIN: 0
COUGH: 0
EYE REDNESS: 0
SHORTNESS OF BREATH: 0
VOMITING: 0
WHEEZING: 0
BACK PAIN: 0

## 2019-08-05 NOTE — PROGRESS NOTES
[facility]  [facility] UROLOGY SPECIALISTS - [facility] [address] [facility] [address]  Dept: [id number] [facility] Urology Office Note - Established    Patient:  Levon Sandoval  YOB: 1955  Date: 8/5/2019    The patient is a 59 y.o. male who presents todayfor evaluation of the following problems:   Chief Complaint   Patient presents with    Results     6 week       HPI  Pt has h/o stones. No pain. Here to review labs and litholink. Summary of old records: N/A    Additional History: N/A    Procedures Today: N/A    Urinalysis today:  No results found for this visit on 08/05/19. Last several PSA's:  Lab Results   Component Value Date    PSA 0.18 04/05/2018    PSA 0.17 12/28/2017    PSA 0.20 08/07/2015     Last total testosterone:  No results found for: TESTOSTERONE    AUA Symptom Score (8/5/2019):  INCOMPLETE EMPTYING: How often have you had the sensation of not emptying your bladder?: Not at all  FREQUENCY: How often do you have to urinate less than every two hours?: Not at all  INTERMITTENCY: How often have you found you stopped and started again several times when you urinated?: Not at all  URGENCY: How often have you found it difficult to postpone urination?: Not at all  WEAK STREAM: How often have you had a weak urinary stream?: Less than 1 to 5 times  STRAINING: How often have you had to strain to start  urination?: Not at all  NOCTURIA: How many times did you typically get up at night to uriniate?: 2 Times  TOTAL I-PSS SCORE[de-identified] 3       Last BUN and creatinine:  Lab Results   Component Value Date    BUN 20 06/28/2019     Lab Results   Component Value Date    CREATININE 0.98 06/28/2019       Additional Lab/Culture results: litholink and labs reviewed.   Pt has severe hyperoxaluria    Imaging Reviewed during this Office Visit: none  (results were independently reviewed by physician and radiology report verified)    PAST MEDICAL, FAMILY AND SOCIAL HISTORY UPDATE:  Past PLACEMENT Left 09/30/15    tiems 2 stents     Family History   Problem Relation Age of Onset    Heart Disease Mother     Diabetes Mother     Heart Disease Father     Diabetes Sister     Diabetes Maternal Grandmother      Outpatient Medications Marked as Taking for the 8/5/19 encounter (Office Visit) with Huey Otero MD   Medication Sig Dispense Refill    FARXIGA 5 MG tablet Take 5 mg by mouth every morning       glimepiride (AMARYL) 4 MG tablet Take 4 mg by mouth 2 times daily       BASAGLAR KWIKPEN 100 UNIT/ML injection pen Inject 58 Units into the skin daily       Insulin Pen Needle (B-D UF III MINI PEN NEEDLES) 31G X 5 MM MISC Inject 1 each into the skin daily 100 each 3    losartan (COZAAR) 50 MG tablet Take 1 tablet by mouth daily 90 tablet 3    lovastatin (MEVACOR) 20 MG tablet Take 1 tablet by mouth nightly 90 tablet 3    VASCEPA 1 g CAPS capsule Take 2 capsules by mouth daily 180 capsule 3    aspirin 81 MG chewable tablet Take 1 tablet by mouth daily Hold for 5 days after surgery, until 1/16/18 (Patient taking differently: Take 81 mg by mouth daily ) 30 tablet 0    VENTOLIN  (90 BASE) MCG/ACT inhaler inhale 2 puff every 4 hours prn  0    JANUMET XR  MG TB24 tablet Take 2 tablets by mouth daily         Seasonal  Social History     Tobacco Use   Smoking Status Never Smoker   Smokeless Tobacco Never Used     (Ifpatient a smoker, smoking cessation counseling offered)    Social History     Substance and Sexual Activity   Alcohol Use Yes    Comment: once a week       REVIEW OF SYSTEMS:  Review of Systems    Physical Exam:      Vitals:    08/05/19 0927   BP: (!) 143/85   Pulse: 79   Temp: 97.5 °F (36.4 °C)     Body mass index is 32.93 kg/m². Patient is a 59 y.o. male in no acute distress and alert and oriented to person, place and time. Physical Exam  Constitutional: Patient in no acute distress. Neuro: Alert and oriented to person, place and time.   Psych: Mood normal, affect

## 2019-08-29 ENCOUNTER — TELEPHONE (OUTPATIENT)
Dept: UROLOGY | Age: 64
End: 2019-08-29

## 2019-08-29 NOTE — TELEPHONE ENCOUNTER
Patient called in the office requesting that we send his records over to Dr. Salvador Johnston office.      spk with office no need to send any records they can see all of our notes in the system    [facility] to notify patient that Dr. Salvador Johnston office can see all notes in system

## 2019-11-15 ENCOUNTER — HOSPITAL ENCOUNTER (OUTPATIENT)
Age: 64
Discharge: HOME | End: 2019-11-15
Payer: COMMERCIAL

## 2019-11-15 LAB
ALBUMIN SERPL-MCNC: 4.3 G/DL (ref 3.5–5.2)
ALBUMIN/GLOBULIN RATIO: ABNORMAL (ref 1–2.5)
ALP BLD-CCNC: 56 U/L (ref 40–129)
ALT SERPL-CCNC: 26 U/L (ref 5–41)
ANION GAP SERPL CALCULATED.3IONS-SCNC: 14 MMOL/L (ref 9–17)
AST SERPL-CCNC: 17 U/L
BILIRUB SERPL-MCNC: 0.5 MG/DL (ref 0.3–1.2)
BUN BLDV-MCNC: 18 MG/DL (ref 8–23)
BUN/CREAT BLD: ABNORMAL (ref 9–20)
CALCIUM SERPL-MCNC: 9.9 MG/DL (ref 8.6–10.4)
CHLORIDE BLD-SCNC: 106 MMOL/L (ref 98–107)
CO2: 25 MMOL/L (ref 20–31)
CREAT SERPL-MCNC: 0.99 MG/DL (ref 0.7–1.2)
CREATININE URINE: 73.1 MG/DL (ref 39–259)
GFR AFRICAN AMERICAN: >60 ML/MIN
GFR NON-AFRICAN AMERICAN: >60 ML/MIN
GFR SERPL CREATININE-BSD FRML MDRD: ABNORMAL ML/MIN/{1.73_M2}
GFR SERPL CREATININE-BSD FRML MDRD: ABNORMAL ML/MIN/{1.73_M2}
GLUCOSE BLD-MCNC: 160 MG/DL (ref 70–99)
MICROALBUMIN/CREAT 24H UR: 55 MG/L
MICROALBUMIN/CREAT UR-RTO: 75 MCG/MG CREAT
POTASSIUM SERPL-SCNC: 4.6 MMOL/L (ref 3.7–5.3)
SODIUM BLD-SCNC: 145 MMOL/L (ref 135–144)
T3 FREE: 2.94 PG/ML (ref 2.02–4.43)
THYROXINE, FREE: 1.14 NG/DL (ref 0.93–1.7)
TOTAL CK: 22 U/L (ref 39–308)
TOTAL PROTEIN: 7.4 G/DL (ref 6.4–8.3)
TSH SERPL DL<=0.05 MIU/L-ACNC: 1.77 MIU/L (ref 0.3–5)
VITAMIN B-12: 312 PG/ML (ref 232–1245)

## 2019-11-15 PROCEDURE — 36415 COLL VENOUS BLD VENIPUNCTURE: CPT

## 2019-11-15 PROCEDURE — 82570 ASSAY OF URINE CREATININE: CPT

## 2019-11-15 PROCEDURE — 84481 FREE ASSAY (FT-3): CPT

## 2019-11-15 PROCEDURE — 82607 VITAMIN B-12: CPT

## 2019-11-15 PROCEDURE — 84439 ASSAY OF FREE THYROXINE: CPT

## 2019-11-15 PROCEDURE — 84443 ASSAY THYROID STIM HORMONE: CPT

## 2019-11-15 PROCEDURE — 80053 COMPREHEN METABOLIC PANEL: CPT

## 2019-11-15 PROCEDURE — 82550 ASSAY OF CK (CPK): CPT

## 2019-11-15 PROCEDURE — 82043 UR ALBUMIN QUANTITATIVE: CPT

## 2019-11-25 ENCOUNTER — HOSPITAL ENCOUNTER (OUTPATIENT)
Age: 64
Discharge: HOME | End: 2019-11-25
Payer: COMMERCIAL

## 2019-11-25 DIAGNOSIS — N20.0 NEPHROLITHIASIS: ICD-10-CM

## 2019-11-25 LAB
ANION GAP SERPL CALCULATED.3IONS-SCNC: 14 MMOL/L (ref 9–17)
BUN BLDV-MCNC: 16 MG/DL (ref 8–23)
BUN/CREAT BLD: ABNORMAL (ref 9–20)
CALCIUM IONIZED: 1.3 MMOL/L (ref 1.13–1.33)
CALCIUM SERPL-MCNC: 9.9 MG/DL (ref 8.6–10.4)
CHLORIDE BLD-SCNC: 105 MMOL/L (ref 98–107)
CO2: 22 MMOL/L (ref 20–31)
CREAT SERPL-MCNC: 0.93 MG/DL (ref 0.7–1.2)
GFR AFRICAN AMERICAN: >60 ML/MIN
GFR NON-AFRICAN AMERICAN: >60 ML/MIN
GFR SERPL CREATININE-BSD FRML MDRD: ABNORMAL ML/MIN/{1.73_M2}
GFR SERPL CREATININE-BSD FRML MDRD: ABNORMAL ML/MIN/{1.73_M2}
GLUCOSE BLD-MCNC: 152 MG/DL (ref 70–99)
PHOSPHORUS: 3.5 MG/DL (ref 2.5–4.5)
POTASSIUM SERPL-SCNC: 4.3 MMOL/L (ref 3.7–5.3)
PTH INTACT: 28.04 PG/ML (ref 15–65)
SODIUM BLD-SCNC: 141 MMOL/L (ref 135–144)
URIC ACID: 7.1 MG/DL (ref 3.4–7)
VITAMIN D 25-HYDROXY: 11.9 NG/ML (ref 30–100)

## 2019-11-25 PROCEDURE — 84550 ASSAY OF BLOOD/URIC ACID: CPT

## 2019-11-25 PROCEDURE — 80048 BASIC METABOLIC PNL TOTAL CA: CPT

## 2019-11-25 PROCEDURE — 82330 ASSAY OF CALCIUM: CPT

## 2019-11-25 PROCEDURE — 82306 VITAMIN D 25 HYDROXY: CPT

## 2019-11-25 PROCEDURE — 83970 ASSAY OF PARATHORMONE: CPT

## 2019-11-25 PROCEDURE — 84100 ASSAY OF PHOSPHORUS: CPT

## 2019-11-25 PROCEDURE — 36415 COLL VENOUS BLD VENIPUNCTURE: CPT

## 2019-11-25 PROCEDURE — 82652 VIT D 1 25-DIHYDROXY: CPT

## 2019-11-27 LAB — VITAMIN D 1,25-DIHYDROXY: 16.4 PG/ML (ref 19.9–79.3)

## 2019-12-20 ENCOUNTER — OFFICE VISIT (OUTPATIENT)
Dept: PRIMARY CARE CLINIC | Age: 64
End: 2019-12-20
Payer: COMMERCIAL

## 2019-12-20 VITALS
BODY MASS INDEX: 31.36 KG/M2 | WEIGHT: 231.2 LBS | SYSTOLIC BLOOD PRESSURE: 132 MMHG | OXYGEN SATURATION: 98 % | DIASTOLIC BLOOD PRESSURE: 82 MMHG | HEART RATE: 81 BPM

## 2019-12-20 DIAGNOSIS — Z23 NEEDS FLU SHOT: ICD-10-CM

## 2019-12-20 DIAGNOSIS — Z79.4 TYPE 2 DIABETES MELLITUS WITHOUT COMPLICATION, WITH LONG-TERM CURRENT USE OF INSULIN (HCC): ICD-10-CM

## 2019-12-20 DIAGNOSIS — I10 ESSENTIAL HYPERTENSION: Primary | ICD-10-CM

## 2019-12-20 DIAGNOSIS — E11.9 TYPE 2 DIABETES MELLITUS WITHOUT COMPLICATION, WITH LONG-TERM CURRENT USE OF INSULIN (HCC): ICD-10-CM

## 2019-12-20 PROCEDURE — 99214 OFFICE O/P EST MOD 30 MIN: CPT | Performed by: FAMILY MEDICINE

## 2019-12-20 PROCEDURE — 90471 IMMUNIZATION ADMIN: CPT | Performed by: FAMILY MEDICINE

## 2019-12-20 PROCEDURE — 90686 IIV4 VACC NO PRSV 0.5 ML IM: CPT | Performed by: FAMILY MEDICINE

## 2019-12-20 ASSESSMENT — ENCOUNTER SYMPTOMS: SHORTNESS OF BREATH: 1

## 2019-12-23 ENCOUNTER — HOSPITAL ENCOUNTER (OUTPATIENT)
Age: 64
Setting detail: SPECIMEN
Discharge: HOME | End: 2019-12-23
Payer: COMMERCIAL

## 2019-12-23 DIAGNOSIS — I10 ESSENTIAL HYPERTENSION: ICD-10-CM

## 2019-12-23 DIAGNOSIS — E11.9 TYPE 2 DIABETES MELLITUS WITHOUT COMPLICATION, WITH LONG-TERM CURRENT USE OF INSULIN (HCC): ICD-10-CM

## 2019-12-23 DIAGNOSIS — Z79.4 TYPE 2 DIABETES MELLITUS WITHOUT COMPLICATION, WITH LONG-TERM CURRENT USE OF INSULIN (HCC): ICD-10-CM

## 2019-12-23 LAB
CHOLESTEROL/HDL RATIO: 5.6
CHOLESTEROL: 124 MG/DL
HDLC SERPL-MCNC: 22 MG/DL
LDL CHOLESTEROL DIRECT: 36 MG/DL
LDL CHOLESTEROL: ABNORMAL MG/DL (ref 0–130)
TRIGL SERPL-MCNC: 518 MG/DL
VLDLC SERPL CALC-MCNC: ABNORMAL MG/DL (ref 1–30)

## 2020-02-10 ENCOUNTER — TELEPHONE (OUTPATIENT)
Dept: UROLOGY | Age: 65
End: 2020-02-10

## 2020-02-10 NOTE — TELEPHONE ENCOUNTER
Called patient to reschedule appointment, patient did not answer,left voicemail with our information, and sent a letter.

## 2020-02-20 LAB
AVERAGE GLUCOSE: 294
HBA1C MFR BLD: 7.2 %

## 2020-02-24 ENCOUNTER — OFFICE VISIT (OUTPATIENT)
Dept: UROLOGY | Age: 65
End: 2020-02-24
Payer: COMMERCIAL

## 2020-02-24 VITALS
HEIGHT: 72 IN | TEMPERATURE: 97.6 F | DIASTOLIC BLOOD PRESSURE: 82 MMHG | BODY MASS INDEX: 31.32 KG/M2 | SYSTOLIC BLOOD PRESSURE: 118 MMHG | WEIGHT: 231.26 LBS

## 2020-02-24 PROCEDURE — 99214 OFFICE O/P EST MOD 30 MIN: CPT | Performed by: UROLOGY

## 2020-02-24 RX ORDER — MONTELUKAST SODIUM 10 MG/1
TABLET ORAL
COMMUNITY
Start: 2020-01-27 | End: 2020-09-18

## 2020-02-24 ASSESSMENT — ENCOUNTER SYMPTOMS
COUGH: 0
VOMITING: 0
DIARRHEA: 1
ABDOMINAL PAIN: 0
CONSTIPATION: 0
EYE PAIN: 0
WHEEZING: 0
BACK PAIN: 0
EYE REDNESS: 0
NAUSEA: 0
SHORTNESS OF BREATH: 0

## 2020-02-24 NOTE — PROGRESS NOTES
Value Date    BUN 16 11/25/2019     Lab Results   Component Value Date    CREATININE 0.93 11/25/2019       Additional Lab/Culture results: none    Imaging Reviewed during this Office Visit: none  (results were independently reviewed by physician and radiology report verified)    PAST MEDICAL, FAMILY AND SOCIAL HISTORY UPDATE:  Past Medical History:   Diagnosis Date    Asthma     Benign bladder tumor     Cancer (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 Viola Amador Left 9-21-15    2 stents in Lt.  CYSTOSCOPY  09/30/2015    HERNIA REPAIR Left     inguinal    KNEE ARTHROSCOPY      LITHOTRIPSY Left     x2    LITHOTRIPSY  09/30/15    laser    LITHOTRIPSY Left 01/10/2018    LUNG SURGERY Right 5/23/2011    right lower lobe \"lung carcinoid\" removal    ND FRAGMENT KIDNEY STONE/ ESWL Left 1/10/2018    ESWL EXTRACORPEAL SHOCK WAVE LITHOTRIPSY, POSSIBLE  STENT PLACEMENT ([employer] [facility] CONF# [id number]) performed by Rafa Viveros MD at [address] [place] [address] [facility] / [address] [place] [address] Left 6/9/2014    Excision lesion of nose. Dr. Alicia Kinney.     SHOULDER SURGERY Right     closed manipulation    SHOULDER SURGERY Left     open manipulation    SKIN BIOPSY  11/12/2007 meir acilla and neck--squamous papillomas--lft buttock--subcutaneous abscess, back--compound nevus, lft neck--lentigo simplex, lft chest --junctional nevus, rt forearm--blue nevus    SKIN BIOPSY  12/9/2009    lft thigh--follicular cyst    SKIN BIOPSY  2/10/2010    rt thigh--ruptured epidermal inclusion cyst    TONSILLECTOMY      URETER STENT PLACEMENT Left 09/30/15    tiems 2 stents     Family History   Problem Relation Age of Onset    Heart Disease Mother     Diabetes Mother     Heart Disease Father     Diabetes Sister     Diabetes Maternal Grandmother      Outpatient Medications Marked as Taking for the 2/24/20 encounter (Office Visit) with Demetria Mclaughlin MD   Medication Sig Dispense Refill    montelukast (SINGULAIR) 10 MG tablet       fenofibrate (TRICOR) 145 MG tablet Take 145 mg by mouth daily      FARXIGA 5 MG tablet Take 5 mg by mouth every morning       glimepiride (AMARYL) 4 MG tablet Take 4 mg by mouth 2 times daily       BASAGLAR KWIKPEN 100 UNIT/ML injection pen Inject 60 Units into the skin daily       Insulin Pen Needle (B-D UF III MINI PEN NEEDLES) 31G X 5 MM MISC Inject 1 each into the skin daily 100 each 3    losartan (COZAAR) 50 MG tablet Take 1 tablet by mouth daily 90 tablet 3    lovastatin (MEVACOR) 20 MG tablet Take 1 tablet by mouth nightly 90 tablet 3    VASCEPA 1 g CAPS capsule Take 2 capsules by mouth daily 180 capsule 3    aspirin 81 MG chewable tablet Take 1 tablet by mouth daily Hold for 5 days after surgery, until 1/16/18 (Patient taking differently: Take 81 mg by mouth daily ) 30 tablet 0    VENTOLIN  (90 BASE) MCG/ACT inhaler inhale 2 puff every 4 hours prn  0    JANUMET XR  MG TB24 tablet Take 2 tablets by mouth daily         Seasonal  Social History     Tobacco Use   Smoking Status Never Smoker   Smokeless Tobacco Never Used     (Ifpatient a smoker, smoking cessation counseling offered)    Social History     Substance and Sexual Activity   Alcohol Use Yes    Comment: once a week       REVIEW OF SYSTEMS:  Review of Systems    Physical Exam:      Vitals:    02/24/20 0937   BP: 118/82   Temp: 97.6 °F (36.4 °C)     Body mass index is 31.36 kg/m². Patient is a 72 y.o. male in no acute distress and alert and oriented to person, place and time. Physical Exam  Constitutional: Patient in no acute distress. Neuro: Alert and oriented to person, place and time. Psych: Mood normal, affect normal  Skin: No rash noted  HEENT: Head: Normocephalic andatraumatic  Conjunctivae and EOM are normal. Pupils are equal, round  Nose:Normal  Right External Ear: Normal; Left External Ear: Normal  Mouth: Mucosa Moist  Neck: Supple  Lungs: Respiratory effort is normal  Cardiovascular: Warm & Pink  Abdomen: Soft, non-tender, non-distended with no CVA,  No flank tenderness,  Or hepatosplenomegaly   Lymphatics: No palpablelymphadenopathy. Bladder non-tender and not distended. Musculoskeletal: Normal gait and station      Assessment and Plan      1. Kidney stones    2. Urgency of urination    3. Urinary frequency    4. Benign localized prostatic hyperplasia with lower urinary tract symptoms (LUTS)           Plan:   F/u 6 mo with renal u/s and kub      Return in about 6 months (around 8/24/2020) for kub and renal u/s. Prescriptions Ordered:  No orders of the defined types were placed in this encounter. Orders Placed:  Orders Placed This Encounter   Procedures    XR ABDOMEN (KUB) (SINGLE AP VIEW)     Standing Status:   Future     Standing Expiration Date:   2/24/2021     Order Specific Question:   Reason for exam:     Answer:   kidney stone    US Renal Kidney     Standing Status:   Future     Standing Expiration Date:   2/18/2021     Order Specific Question:   Reason for exam:     Answer:   kidney stone           Owen Kim MD    Agree with the ROS entered by the MA.

## 2020-04-13 RX ORDER — LOVASTATIN 20 MG/1
20 TABLET ORAL NIGHTLY
Qty: 90 TABLET | Refills: 3 | Status: SHIPPED | OUTPATIENT
Start: 2020-04-13 | End: 2020-09-18

## 2020-06-13 ENCOUNTER — HOSPITAL ENCOUNTER (EMERGENCY)
Age: 65
Discharge: HOME | End: 2020-06-14
Attending: EMERGENCY MEDICINE
Payer: COMMERCIAL

## 2020-06-13 ENCOUNTER — APPOINTMENT (OUTPATIENT)
Dept: CT IMAGING | Age: 65
End: 2020-06-13
Payer: COMMERCIAL

## 2020-06-13 VITALS
SYSTOLIC BLOOD PRESSURE: 155 MMHG | WEIGHT: 232 LBS | DIASTOLIC BLOOD PRESSURE: 73 MMHG | RESPIRATION RATE: 16 BRPM | TEMPERATURE: 98.1 F | OXYGEN SATURATION: 96 % | BODY MASS INDEX: 31.42 KG/M2 | HEIGHT: 72 IN | HEART RATE: 85 BPM

## 2020-06-13 LAB
ABSOLUTE EOS #: 0.25 K/UL (ref 0–0.4)
ABSOLUTE IMMATURE GRANULOCYTE: ABNORMAL K/UL (ref 0–0.3)
ABSOLUTE LYMPH #: 1.6 K/UL (ref 1–4.8)
ABSOLUTE MONO #: 0.5 K/UL (ref 0.1–1.3)
ANION GAP SERPL CALCULATED.3IONS-SCNC: 16 MMOL/L (ref 9–17)
ATYPICAL LYMPHOCYTE ABSOLUTE COUNT: 0.25 K/UL
ATYPICAL LYMPHOCYTES: 3 %
BASOPHILS # BLD: 0 % (ref 0–2)
BASOPHILS ABSOLUTE: 0 K/UL (ref 0–0.2)
BILIRUBIN URINE: NEGATIVE
BUN BLDV-MCNC: 25 MG/DL (ref 8–23)
BUN/CREAT BLD: ABNORMAL (ref 9–20)
CALCIUM SERPL-MCNC: 9.3 MG/DL (ref 8.6–10.4)
CHLORIDE BLD-SCNC: 103 MMOL/L (ref 98–107)
CO2: 19 MMOL/L (ref 20–31)
COLOR: YELLOW
COMMENT UA: ABNORMAL
CREAT SERPL-MCNC: 1.06 MG/DL (ref 0.7–1.2)
DIFFERENTIAL TYPE: ABNORMAL
EOSINOPHILS RELATIVE PERCENT: 3 % (ref 0–4)
GFR AFRICAN AMERICAN: >60 ML/MIN
GFR NON-AFRICAN AMERICAN: >60 ML/MIN
GFR SERPL CREATININE-BSD FRML MDRD: ABNORMAL ML/MIN/{1.73_M2}
GFR SERPL CREATININE-BSD FRML MDRD: ABNORMAL ML/MIN/{1.73_M2}
GLUCOSE BLD-MCNC: 246 MG/DL (ref 70–99)
GLUCOSE URINE: ABNORMAL
HCT VFR BLD CALC: 47.8 % (ref 41–53)
HEMOGLOBIN: 16.3 G/DL (ref 13.5–17.5)
IMMATURE GRANULOCYTES: ABNORMAL %
KETONES, URINE: NEGATIVE
LEUKOCYTE ESTERASE, URINE: NEGATIVE
LYMPHOCYTES # BLD: 19 % (ref 24–44)
MCH RBC QN AUTO: 30.6 PG (ref 26–34)
MCHC RBC AUTO-ENTMCNC: 34.1 G/DL (ref 31–37)
MCV RBC AUTO: 89.8 FL (ref 80–100)
MONOCYTES # BLD: 6 % (ref 1–7)
MORPHOLOGY: ABNORMAL
NITRITE, URINE: NEGATIVE
NRBC AUTOMATED: ABNORMAL PER 100 WBC
PDW BLD-RTO: 13.6 % (ref 11.5–14.9)
PH UA: 5 (ref 5–8)
PLATELET # BLD: 209 K/UL (ref 150–450)
PLATELET ESTIMATE: ABNORMAL
PMV BLD AUTO: 9.2 FL (ref 6–12)
POTASSIUM SERPL-SCNC: 4.1 MMOL/L (ref 3.7–5.3)
PROTEIN UA: NEGATIVE
RBC # BLD: 5.32 M/UL (ref 4.5–5.9)
RBC # BLD: ABNORMAL 10*6/UL
SEG NEUTROPHILS: 69 % (ref 36–66)
SEGMENTED NEUTROPHILS ABSOLUTE COUNT: 5.8 K/UL (ref 1.3–9.1)
SODIUM BLD-SCNC: 138 MMOL/L (ref 135–144)
SPECIFIC GRAVITY UA: 1.03 (ref 1–1.03)
TURBIDITY: CLEAR
URINE HGB: NEGATIVE
UROBILINOGEN, URINE: NORMAL
WBC # BLD: 8.4 K/UL (ref 3.5–11)
WBC # BLD: ABNORMAL 10*3/UL

## 2020-06-13 PROCEDURE — 96374 THER/PROPH/DIAG INJ IV PUSH: CPT

## 2020-06-13 PROCEDURE — 80048 BASIC METABOLIC PNL TOTAL CA: CPT

## 2020-06-13 PROCEDURE — 74176 CT ABD & PELVIS W/O CONTRAST: CPT

## 2020-06-13 PROCEDURE — 81003 URINALYSIS AUTO W/O SCOPE: CPT

## 2020-06-13 PROCEDURE — 85025 COMPLETE CBC W/AUTO DIFF WBC: CPT

## 2020-06-13 PROCEDURE — 36415 COLL VENOUS BLD VENIPUNCTURE: CPT

## 2020-06-13 PROCEDURE — 2580000003 HC RX 258: Performed by: STUDENT IN AN ORGANIZED HEALTH CARE EDUCATION/TRAINING PROGRAM

## 2020-06-13 PROCEDURE — 6360000002 HC RX W HCPCS: Performed by: STUDENT IN AN ORGANIZED HEALTH CARE EDUCATION/TRAINING PROGRAM

## 2020-06-13 PROCEDURE — 99284 EMERGENCY DEPT VISIT MOD MDM: CPT

## 2020-06-13 RX ORDER — KETOROLAC TROMETHAMINE 30 MG/ML
15 INJECTION, SOLUTION INTRAMUSCULAR; INTRAVENOUS ONCE
Status: COMPLETED | OUTPATIENT
Start: 2020-06-13 | End: 2020-06-13

## 2020-06-13 RX ORDER — 0.9 % SODIUM CHLORIDE 0.9 %
1000 INTRAVENOUS SOLUTION INTRAVENOUS ONCE
Status: COMPLETED | OUTPATIENT
Start: 2020-06-13 | End: 2020-06-14

## 2020-06-13 RX ADMIN — SODIUM CHLORIDE 1000 ML: 9 INJECTION, SOLUTION INTRAVENOUS at 23:09

## 2020-06-13 RX ADMIN — KETOROLAC TROMETHAMINE 15 MG: 30 INJECTION, SOLUTION INTRAMUSCULAR at 23:09

## 2020-06-13 ASSESSMENT — PAIN DESCRIPTION - PAIN TYPE: TYPE: ACUTE PAIN

## 2020-06-13 ASSESSMENT — PAIN DESCRIPTION - DESCRIPTORS: DESCRIPTORS: SHARP

## 2020-06-13 ASSESSMENT — PAIN DESCRIPTION - ORIENTATION: ORIENTATION: RIGHT

## 2020-06-13 ASSESSMENT — PAIN DESCRIPTION - LOCATION: LOCATION: FLANK

## 2020-06-13 ASSESSMENT — PAIN SCALES - GENERAL: PAINLEVEL_OUTOF10: 8

## 2020-06-14 RX ORDER — IBUPROFEN 600 MG/1
600 TABLET ORAL EVERY 6 HOURS PRN
Qty: 30 TABLET | Refills: 0 | Status: ON HOLD | OUTPATIENT
Start: 2020-06-14 | End: 2022-10-14 | Stop reason: HOSPADM

## 2020-06-14 RX ORDER — OXYCODONE HYDROCHLORIDE 5 MG/1
5 TABLET ORAL EVERY 6 HOURS PRN
Qty: 10 TABLET | Refills: 0 | Status: SHIPPED | OUTPATIENT
Start: 2020-06-14 | End: 2020-06-17

## 2020-06-14 RX ORDER — ACETAMINOPHEN 500 MG
1000 TABLET ORAL EVERY 6 HOURS PRN
Qty: 30 TABLET | Refills: 0 | Status: SHIPPED | OUTPATIENT
Start: 2020-06-14 | End: 2021-03-01

## 2020-06-14 RX ORDER — TAMSULOSIN HYDROCHLORIDE 0.4 MG/1
0.4 CAPSULE ORAL DAILY
Qty: 5 CAPSULE | Refills: 0 | Status: ON HOLD | OUTPATIENT
Start: 2020-06-14 | End: 2020-06-17 | Stop reason: HOSPADM

## 2020-06-14 ASSESSMENT — ENCOUNTER SYMPTOMS
EYE REDNESS: 0
EYE DISCHARGE: 0
CHEST TIGHTNESS: 0
ABDOMINAL PAIN: 0
SHORTNESS OF BREATH: 0
NAUSEA: 0
VOMITING: 0

## 2020-06-14 ASSESSMENT — PAIN SCALES - GENERAL: PAINLEVEL_OUTOF10: 2

## 2020-06-14 NOTE — ED PROVIDER NOTES
[facility] ED  Emergency Department Encounter  Emergency Medicine Resident     Pt Name: Archie Burden  MRN: [id number]  [place] 1955  Date of evaluation: 6/13/20  PCP:  Ann-Marie Capellan MD    CHIEF COMPLAINT       Chief Complaint   Patient presents with    Flank Pain     right       HISTORY OFPRESENT ILLNESS  (Location/Symptom, Timing/Onset, Context/Setting, Quality, Duration, Modifying Factors,Severity.)      Archie Burden is a 72 y.o. male who presents with sudden onset right flank pain. Patient has kidney stones he states this feels very similar. Denies any hematuria dysuria. Pain is located over the right flank. Nonradiating. Sharp. Moderate in severity. Nothing making it better or worse. States he has had multiple stones and required lithotripsy in the past.  Normal urination. PAST MEDICAL / SURGICAL / SOCIAL / FAMILY HISTORY      has a past medical history of Asthma, Benign bladder tumor, Cancer ([address] Carcinoid tumor of lung, H/O arthroscopy of left knee, H/O seasonal allergies, Hearing aid worn, Hematuria, Hyperlipidemia, Hypertension, Kidney stone, Left ureteral stone, Neoplasm of unspecified nature of bone, soft tissue, and skin, Osteoarthritis, Snores, SOB (shortness of breath), Type II or unspecified type diabetes mellitus without mention of complication, not stated as uncontrolled, Ureteral stent retained, and Wears glasses. has a past surgical history that includes Tonsillectomy; bladder tumor excision (2009); skin biopsy (11/12/2007); skin biopsy (12/9/2009); skin biopsy (2/10/2010); Lung surgery (Right, 5/23/2011); pre-malignant / benign skin lesion excision (Left, 6/9/2014); shoulder surgery (Right); shoulder surgery (Left); Colonoscopy (09/08/2015); Cystoscopy; hernia repair (Left); bronchoscopy; Lithotripsy (Left); Knee arthroscopy; Cystoscopy (Left, 9-21-15); Cystoscopy (09/30/2015); Ureter stent placement (Left, 09/30/15);  Lithotripsy (09/30/15); Cardiovascular: Negative for chest pain. Gastrointestinal: Negative for abdominal pain, nausea and vomiting. Genitourinary: Positive for flank pain. Musculoskeletal: Negative for myalgias. Skin: Negative for rash. Allergic/Immunologic: Positive for environmental allergies. Neurological: Negative for headaches. Psychiatric/Behavioral: Negative for agitation and confusion. PHYSICAL EXAM   (up to 7 for level 4, 8 or more for level 5)     INITIAL VITALS:    height is 6' (1.829 m) and weight is 232 lb (105.2 kg). His oral temperature is 98.1 °F (36.7 °C). His blood pressure is 155/73 (abnormal) and his pulse is 85. His respiration is 16 and oxygen saturation is 96%. Physical Exam  Vitals signs and nursing note reviewed. Constitutional:       Appearance: He is well-developed. HENT:      Head: Normocephalic and atraumatic. Nose: Nose normal.      Mouth/Throat:      Mouth: Mucous membranes are moist.   Eyes:      General: No scleral icterus. Conjunctiva/sclera: Conjunctivae normal.      Pupils: Pupils are equal, round, and reactive to light. Neck:      Musculoskeletal: Neck supple. Cardiovascular:      Rate and Rhythm: Normal rate and regular rhythm. Heart sounds: Normal heart sounds. No murmur. No friction rub. No gallop. Pulmonary:      Effort: Pulmonary effort is normal. No respiratory distress. Breath sounds: Normal breath sounds. No wheezing or rales. Abdominal:      General: There is no distension. Palpations: Abdomen is soft. Tenderness: There is no abdominal tenderness. Musculoskeletal: Normal range of motion. Comments: Mild pain to palpation of right paraspinal musculature. No rashes visualized. No CVA tenderness. Skin:     General: Skin is warm and dry. Findings: No erythema or rash. Neurological:      Mental Status: He is alert and oriented to person, place, and time.    Psychiatric:         Behavior: Behavior normal. DIFFERENTIAL  DIAGNOSIS     PLAN (LABS / IMAGING / EKG):  Orders Placed This Encounter   Procedures    CT ABDOMEN PELVIS WO CONTRAST    Urinalysis Reflex to Culture    Basic Metabolic Panel    CBC Auto Differential       MEDICATIONS ORDERED:  Orders Placed This Encounter   Medications    ketorolac (TORADOL) injection 15 mg    0.9 % sodium chloride bolus    tamsulosin (FLOMAX) 0.4 MG capsule     Sig: Take 1 capsule by mouth daily for 5 days     Dispense:  5 capsule     Refill:  0    acetaminophen (APAP EXTRA STRENGTH) 500 MG tablet     Sig: Take 2 tablets by mouth every 6 hours as needed for Pain     Dispense:  30 tablet     Refill:  0    ibuprofen (ADVIL;MOTRIN) 600 MG tablet     Sig: Take 1 tablet by mouth every 6 hours as needed for Pain     Dispense:  30 tablet     Refill:  0    oxyCODONE (ROXICODONE) 5 MG immediate release tablet     Sig: Take 1 tablet by mouth every 6 hours as needed for Pain for up to 3 days. Dispense:  10 tablet     Refill:  0       DDX: Nephrolithiasis versus appendicitis versus UTI versus pyelonephritis versus dehydration versus electrolyte abnormality    Initial MDM/Plan: 72 y.o. male who presents with right flank pain. Patient has had kidney history of kidney stones and has not had a CT in over a year. Due to patient age and comorbidities will get CT scan to assess size of possible kidney stone. Toradol for pain. Anticipate discharge with urology follow-up.     DIAGNOSTIC RESULTS / EMERGENCY DEPARTMENT COURSE / MDM     LABS:  Labs Reviewed   URINE RT REFLEX TO CULTURE - Abnormal; Notable for the following components:       Result Value    Glucose, Ur 3+ (*)     All other components within normal limits   BASIC METABOLIC PANEL - Abnormal; Notable for the following components:    Glucose 246 (*)     BUN 25 (*)     CO2 19 (*)     All other components within normal limits   CBC WITH AUTO DIFFERENTIAL - Abnormal; Notable for the following components:    Seg Neutrophils

## 2020-06-15 ENCOUNTER — OFFICE VISIT (OUTPATIENT)
Dept: UROLOGY | Age: 65
End: 2020-06-15
Payer: COMMERCIAL

## 2020-06-15 ENCOUNTER — TELEPHONE (OUTPATIENT)
Dept: UROLOGY | Age: 65
End: 2020-06-15

## 2020-06-15 ENCOUNTER — CARE COORDINATION (OUTPATIENT)
Dept: CARE COORDINATION | Age: 65
End: 2020-06-15

## 2020-06-15 VITALS — TEMPERATURE: 98.2 F

## 2020-06-15 PROCEDURE — 99214 OFFICE O/P EST MOD 30 MIN: CPT | Performed by: UROLOGY

## 2020-06-15 ASSESSMENT — ENCOUNTER SYMPTOMS
DIARRHEA: 0
EYE PAIN: 0
BACK PAIN: 0
ABDOMINAL PAIN: 0
WHEEZING: 0
SHORTNESS OF BREATH: 0
EYE REDNESS: 0
VOMITING: 0
COUGH: 0
CONSTIPATION: 0
NAUSEA: 0

## 2020-06-15 NOTE — TELEPHONE ENCOUNTER
Cysto, (RT) URS, HLL. (RT) stent placement @ ST 6/1720 9:30am  PAT same day   COVID-19 rapid testing       Spoke with patient, procedure info emailed.

## 2020-06-15 NOTE — PROGRESS NOTES
[facility]  [facility] - [facility]  [address] [place] [id number]  Dept: [address] [facility] Urology Office Note - Established    Patient:  Archie Burden  YOB: 1955  Date: 6/15/2020    The patient is a 72 y.o. male who presents todayfor evaluation of the following problems:   Chief Complaint   Patient presents with    Follow[facility]Up from [facility]    Nephrolithiasis       HPI  Ureteral calculus:  Patient is here today for a ureteral calculus which was first noted a day(s) ago. Location: right UVJ  Size: 7 mm  Recently the ureteral calculus symptoms: show no change  Current medical Rx for ureteral calculus: analgesics  Flank pain? Yes, right  Hematuria? none  Passed recent calculus? No  Personal History of Kidney Stones: yes - many  Stone composition: unknown    Pt has had multiple stones in the past.  Has urgency and frequency of urination. This is chronic and stable. Lab Results   Component Value Date    CALCIUM 9.3 06/13/2020    PHOS 3.5 11/25/2019     Lab Results   Component Value Date     06/13/2020    K 4.1 06/13/2020     06/13/2020    CO2 19 (L) 06/13/2020         Summary of old records: N/A    Additional History: N/A    Procedures Today: N/A    Urinalysis today:  No results found for this visit on 06/15/20.   Last several PSA's:  Lab Results   Component Value Date    PSA 0.18 04/05/2018    PSA 0.17 12/28/2017    PSA 0.20 08/07/2015     Last total testosterone:  No results found for: TESTOSTERONE    AUA Symptom Score (6/15/2020):  INCOMPLETE EMPTYING: How often have you had the sensation of not emptying your bladder?: Not at all  FREQUENCY: How often do you have to urinate less than every two hours?: About Half the time  INTERMITTENCY: How often have you found you stopped and started again several times when you urinated?: Not at all  URGENCY: How often have you found it difficult to postpone urination?: Not at all  WEAK STREAM: How often have you had a weak urinary stream?: Not at all  STRAINING: How often have you had to strain to start  urination?: Not at all  NOCTURIA: How many times did you typically get up at night to uriniate?: 2 Times  TOTAL I-PSS SCORE[de-identified] 5  How would you feel if you were to spend the rest of your life with your urinary condition?: Mostly Satisfied    Last BUN and creatinine:  Lab Results   Component Value Date    BUN 25 (H) 06/13/2020     Lab Results   Component Value Date    CREATININE 1.06 06/13/2020       Additional Lab/Culture results: none    Imaging Reviewed during this Office Visit: ct 7mm right uvj stone  (results were independently reviewed by physician and radiology report verified)    PAST MEDICAL, FAMILY AND SOCIAL HISTORY UPDATE:  Past Medical History:   Diagnosis Date    Asthma     Benign bladder tumor     [address] [address]     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, lower lobe    H/O arthroscopy of left knee     H/O seasonal allergies     Hearing aid worn     MICHAEL    Hematuria     Hyperlipidemia     Hypertension     Kidney stone     Left ureteral stone     Neoplasm of unspecified nature of bone, soft tissue, 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 Cooper Villalta Left 9-21-15    2 stents in Lt.     CYSTOSCOPY  09/30/2015    HERNIA REPAIR Left     inguinal    KNEE ARTHROSCOPY      LITHOTRIPSY Left     x2    LITHOTRIPSY  09/30/15    laser    LITHOTRIPSY Left 01/10/2018    LUNG SURGERY Right 5/23/2011    right lower lobe \"lung  BASAGLAR KWIKPEN 100 UNIT/ML injection pen Inject 60 Units into the skin daily       Insulin Pen Needle (B-D UF III MINI PEN NEEDLES) 31G X 5 MM MISC Inject 1 each into the skin daily 100 each 3    losartan (COZAAR) 50 MG tablet Take 1 tablet by mouth daily 90 tablet 3    VASCEPA 1 g CAPS capsule Take 2 capsules by mouth daily 180 capsule 3    aspirin 81 MG chewable tablet Take 1 tablet by mouth daily Hold for 5 days after surgery, until 1/16/18 (Patient taking differently: Take 81 mg by mouth daily ) 30 tablet 0    VENTOLIN  (90 BASE) MCG/ACT inhaler inhale 2 puff every 4 hours prn  0    JANUMET XR  MG TB24 tablet Take 2 tablets by mouth daily         Seasonal  Social History     Tobacco Use   Smoking Status Never Smoker   Smokeless Tobacco Never Used     (Ifpatient a smoker, smoking cessation counseling offered)    Social History     Substance and Sexual Activity   Alcohol Use Yes    Comment: once a week       REVIEW OF SYSTEMS:  Review of Systems    Physical Exam:      Vitals:    06/15/20 1222   Temp: 98.2 °F (36.8 °C)     There is no height or weight on file to calculate BMI. Patient is a 72 y.o. male in no acute distress and alert and oriented to person, place and time. Physical Exam  Constitutional: Patient in no acute distress. Neuro: Alert and oriented to person, place and time. Psych: Mood normal, affect normal  Skin: No rash noted  HEENT: Head: Normocephalic andatraumatic  Conjunctivae and EOM are normal. Pupils are equal, round  Nose:Normal  Right External Ear: Normal; Left External Ear: Normal  Mouth: Mucosa Moist  Neck: Supple  Lungs: Respiratory effort is normal  Cardiovascular: Warm & Pink  Abdomen: Soft, non-tender, non-distended with no CVA,  No flank tenderness,  Or hepatosplenomegaly   Lymphatics: No palpablelymphadenopathy. Assessment and Plan      1. Ureteral stone    2. Left flank pain    3. Urgency of urination    4.  Urinary frequency           Plan:   Cysto

## 2020-06-15 NOTE — PROGRESS NOTES
Review of Systems   Constitutional: Negative for appetite change, chills and fever. Eyes: Negative for pain, redness and visual disturbance. Respiratory: Negative for cough, shortness of breath and wheezing. Cardiovascular: Negative for chest pain and leg swelling. Gastrointestinal: Negative for abdominal pain, constipation, diarrhea, nausea and vomiting. Genitourinary: Positive for flank pain (right side). Negative for difficulty urinating, dysuria, frequency, hematuria and urgency. Musculoskeletal: Negative for back pain, joint swelling and myalgias. Skin: Negative for rash and wound. Neurological: Negative for dizziness, tremors and numbness. Hematological: Does not bruise/bleed easily.

## 2020-06-16 ENCOUNTER — CARE COORDINATION (OUTPATIENT)
Dept: CARE COORDINATION | Age: 65
End: 2020-06-16

## 2020-06-17 ENCOUNTER — HOSPITAL ENCOUNTER (OUTPATIENT)
Age: 65
Setting detail: OUTPATIENT SURGERY
Discharge: HOME | End: 2020-06-17
Attending: UROLOGY | Admitting: UROLOGY
Payer: COMMERCIAL

## 2020-06-17 ENCOUNTER — APPOINTMENT (OUTPATIENT)
Dept: GENERAL RADIOLOGY | Age: 65
End: 2020-06-17
Attending: UROLOGY
Payer: COMMERCIAL

## 2020-06-17 ENCOUNTER — ANESTHESIA (OUTPATIENT)
Dept: OPERATING ROOM | Age: 65
End: 2020-06-17
Payer: COMMERCIAL

## 2020-06-17 ENCOUNTER — ANESTHESIA EVENT (OUTPATIENT)
Dept: OPERATING ROOM | Age: 65
End: 2020-06-17
Payer: COMMERCIAL

## 2020-06-17 VITALS
TEMPERATURE: 97 F | RESPIRATION RATE: 17 BRPM | SYSTOLIC BLOOD PRESSURE: 145 MMHG | OXYGEN SATURATION: 94 % | DIASTOLIC BLOOD PRESSURE: 78 MMHG | BODY MASS INDEX: 31.42 KG/M2 | WEIGHT: 232 LBS | HEART RATE: 70 BPM | HEIGHT: 72 IN

## 2020-06-17 VITALS — SYSTOLIC BLOOD PRESSURE: 136 MMHG | DIASTOLIC BLOOD PRESSURE: 72 MMHG | TEMPERATURE: 95.8 F | OXYGEN SATURATION: 96 %

## 2020-06-17 LAB
GFR NON-AFRICAN AMERICAN: >60 ML/MIN
GFR SERPL CREATININE-BSD FRML MDRD: >60 ML/MIN
GFR SERPL CREATININE-BSD FRML MDRD: NORMAL ML/MIN/{1.73_M2}
GLUCOSE BLD-MCNC: 161 MG/DL (ref 75–110)
GLUCOSE BLD-MCNC: 185 MG/DL (ref 74–100)
POC CREATININE: 0.98 MG/DL (ref 0.51–1.19)
SARS-COV-2, PCR: NORMAL
SARS-COV-2, RAPID: NOT DETECTED
SARS-COV-2: NORMAL
SOURCE: NORMAL

## 2020-06-17 PROCEDURE — 3700000000 HC ANESTHESIA ATTENDED CARE: Performed by: UROLOGY

## 2020-06-17 PROCEDURE — U0002 COVID-19 LAB TEST NON-CDC: HCPCS

## 2020-06-17 PROCEDURE — 7100000000 HC PACU RECOVERY - FIRST 15 MIN: Performed by: UROLOGY

## 2020-06-17 PROCEDURE — 2580000003 HC RX 258: Performed by: ANESTHESIOLOGY

## 2020-06-17 PROCEDURE — 2500000003 HC RX 250 WO HCPCS: Performed by: NURSE ANESTHETIST, CERTIFIED REGISTERED

## 2020-06-17 PROCEDURE — 2580000003 HC RX 258: Performed by: UROLOGY

## 2020-06-17 PROCEDURE — 2720000010 HC SURG SUPPLY STERILE: Performed by: UROLOGY

## 2020-06-17 PROCEDURE — 7100000041 HC SPAR PHASE II RECOVERY - ADDTL 15 MIN: Performed by: UROLOGY

## 2020-06-17 PROCEDURE — 82565 ASSAY OF CREATININE: CPT

## 2020-06-17 PROCEDURE — 82365 CALCULUS SPECTROSCOPY: CPT

## 2020-06-17 PROCEDURE — 6360000002 HC RX W HCPCS: Performed by: NURSE ANESTHETIST, CERTIFIED REGISTERED

## 2020-06-17 PROCEDURE — 3700000001 HC ADD 15 MINUTES (ANESTHESIA): Performed by: UROLOGY

## 2020-06-17 PROCEDURE — 7100000001 HC PACU RECOVERY - ADDTL 15 MIN: Performed by: UROLOGY

## 2020-06-17 PROCEDURE — 82947 ASSAY GLUCOSE BLOOD QUANT: CPT

## 2020-06-17 PROCEDURE — 2709999900 HC NON-CHARGEABLE SUPPLY: Performed by: UROLOGY

## 2020-06-17 PROCEDURE — C1769 GUIDE WIRE: HCPCS | Performed by: UROLOGY

## 2020-06-17 PROCEDURE — 3600000014 HC SURGERY LEVEL 4 ADDTL 15MIN: Performed by: UROLOGY

## 2020-06-17 PROCEDURE — 3600000004 HC SURGERY LEVEL 4 BASE: Performed by: UROLOGY

## 2020-06-17 PROCEDURE — C1758 CATHETER, URETERAL: HCPCS | Performed by: UROLOGY

## 2020-06-17 PROCEDURE — C2617 STENT, NON-COR, TEM W/O DEL: HCPCS | Performed by: UROLOGY

## 2020-06-17 PROCEDURE — 6360000002 HC RX W HCPCS: Performed by: PHYSICIAN ASSISTANT

## 2020-06-17 PROCEDURE — 7100000040 HC SPAR PHASE II RECOVERY - FIRST 15 MIN: Performed by: UROLOGY

## 2020-06-17 PROCEDURE — 74018 RADEX ABDOMEN 1 VIEW: CPT

## 2020-06-17 DEVICE — STENT URET 6FR L28CM PERCFLX HYDR+ DBL PGTL THRD 2: Type: IMPLANTABLE DEVICE | Site: URETER | Status: FUNCTIONAL

## 2020-06-17 RX ORDER — SODIUM CHLORIDE 0.9 % (FLUSH) 0.9 %
10 SYRINGE (ML) INJECTION PRN
Status: DISCONTINUED | OUTPATIENT
Start: 2020-06-17 | End: 2020-06-17 | Stop reason: HOSPADM

## 2020-06-17 RX ORDER — TAMSULOSIN HYDROCHLORIDE 0.4 MG/1
0.4 CAPSULE ORAL DAILY
Qty: 14 CAPSULE | Refills: 0 | Status: SHIPPED | OUTPATIENT
Start: 2020-06-17 | End: 2020-06-17 | Stop reason: SDUPTHER

## 2020-06-17 RX ORDER — ONDANSETRON 2 MG/ML
4 INJECTION INTRAMUSCULAR; INTRAVENOUS ONCE
Status: DISCONTINUED | OUTPATIENT
Start: 2020-06-17 | End: 2020-06-17 | Stop reason: HOSPADM

## 2020-06-17 RX ORDER — FENTANYL CITRATE 50 UG/ML
INJECTION, SOLUTION INTRAMUSCULAR; INTRAVENOUS PRN
Status: DISCONTINUED | OUTPATIENT
Start: 2020-06-17 | End: 2020-06-17 | Stop reason: SDUPTHER

## 2020-06-17 RX ORDER — LIDOCAINE HYDROCHLORIDE 10 MG/ML
INJECTION, SOLUTION EPIDURAL; INFILTRATION; INTRACAUDAL; PERINEURAL PRN
Status: DISCONTINUED | OUTPATIENT
Start: 2020-06-17 | End: 2020-06-17 | Stop reason: SDUPTHER

## 2020-06-17 RX ORDER — MIDAZOLAM HYDROCHLORIDE 1 MG/ML
INJECTION INTRAMUSCULAR; INTRAVENOUS PRN
Status: DISCONTINUED | OUTPATIENT
Start: 2020-06-17 | End: 2020-06-17 | Stop reason: SDUPTHER

## 2020-06-17 RX ORDER — KETOROLAC TROMETHAMINE 30 MG/ML
INJECTION, SOLUTION INTRAMUSCULAR; INTRAVENOUS PRN
Status: DISCONTINUED | OUTPATIENT
Start: 2020-06-17 | End: 2020-06-17 | Stop reason: SDUPTHER

## 2020-06-17 RX ORDER — ONDANSETRON 2 MG/ML
INJECTION INTRAMUSCULAR; INTRAVENOUS PRN
Status: DISCONTINUED | OUTPATIENT
Start: 2020-06-17 | End: 2020-06-17 | Stop reason: SDUPTHER

## 2020-06-17 RX ORDER — CEPHALEXIN 500 MG/1
500 CAPSULE ORAL 3 TIMES DAILY
Qty: 9 CAPSULE | Refills: 0 | Status: SHIPPED | OUTPATIENT
Start: 2020-06-17 | End: 2020-06-17 | Stop reason: SDUPTHER

## 2020-06-17 RX ORDER — SODIUM CHLORIDE, SODIUM LACTATE, POTASSIUM CHLORIDE, CALCIUM CHLORIDE 600; 310; 30; 20 MG/100ML; MG/100ML; MG/100ML; MG/100ML
INJECTION, SOLUTION INTRAVENOUS CONTINUOUS
Status: DISCONTINUED | OUTPATIENT
Start: 2020-06-17 | End: 2020-06-17 | Stop reason: HOSPADM

## 2020-06-17 RX ORDER — FENTANYL CITRATE 50 UG/ML
25 INJECTION, SOLUTION INTRAMUSCULAR; INTRAVENOUS ONCE
Status: DISCONTINUED | OUTPATIENT
Start: 2020-06-17 | End: 2020-06-17 | Stop reason: HOSPADM

## 2020-06-17 RX ORDER — MAGNESIUM HYDROXIDE 1200 MG/15ML
LIQUID ORAL CONTINUOUS PRN
Status: COMPLETED | OUTPATIENT
Start: 2020-06-17 | End: 2020-06-17

## 2020-06-17 RX ORDER — PROPOFOL 10 MG/ML
INJECTION, EMULSION INTRAVENOUS PRN
Status: DISCONTINUED | OUTPATIENT
Start: 2020-06-17 | End: 2020-06-17 | Stop reason: SDUPTHER

## 2020-06-17 RX ORDER — FENTANYL CITRATE 50 UG/ML
50 INJECTION, SOLUTION INTRAMUSCULAR; INTRAVENOUS EVERY 5 MIN PRN
Status: DISCONTINUED | OUTPATIENT
Start: 2020-06-17 | End: 2020-06-17 | Stop reason: HOSPADM

## 2020-06-17 RX ORDER — TAMSULOSIN HYDROCHLORIDE 0.4 MG/1
0.4 CAPSULE ORAL DAILY
Qty: 14 CAPSULE | Refills: 0 | Status: SHIPPED | OUTPATIENT
Start: 2020-06-17 | End: 2021-03-01

## 2020-06-17 RX ORDER — MIDAZOLAM HYDROCHLORIDE 1 MG/ML
2 INJECTION INTRAMUSCULAR; INTRAVENOUS
Status: DISCONTINUED | OUTPATIENT
Start: 2020-06-17 | End: 2020-06-17 | Stop reason: HOSPADM

## 2020-06-17 RX ORDER — CEPHALEXIN 500 MG/1
500 CAPSULE ORAL 3 TIMES DAILY
Qty: 9 CAPSULE | Refills: 0 | Status: SHIPPED | OUTPATIENT
Start: 2020-06-17 | End: 2020-06-20

## 2020-06-17 RX ORDER — HYDROCODONE BITARTRATE AND ACETAMINOPHEN 5; 325 MG/1; MG/1
1 TABLET ORAL EVERY 6 HOURS PRN
Qty: 12 TABLET | Refills: 0 | Status: SHIPPED | OUTPATIENT
Start: 2020-06-17 | End: 2020-06-20

## 2020-06-17 RX ORDER — SODIUM CHLORIDE 0.9 % (FLUSH) 0.9 %
10 SYRINGE (ML) INJECTION EVERY 12 HOURS SCHEDULED
Status: DISCONTINUED | OUTPATIENT
Start: 2020-06-17 | End: 2020-06-17 | Stop reason: HOSPADM

## 2020-06-17 RX ORDER — ONDANSETRON 2 MG/ML
4 INJECTION INTRAMUSCULAR; INTRAVENOUS
Status: DISCONTINUED | OUTPATIENT
Start: 2020-06-17 | End: 2020-06-17 | Stop reason: HOSPADM

## 2020-06-17 RX ORDER — DEXAMETHASONE SODIUM PHOSPHATE 10 MG/ML
INJECTION INTRAMUSCULAR; INTRAVENOUS PRN
Status: DISCONTINUED | OUTPATIENT
Start: 2020-06-17 | End: 2020-06-17 | Stop reason: SDUPTHER

## 2020-06-17 RX ADMIN — CEFAZOLIN 2 G: 10 INJECTION, POWDER, FOR SOLUTION INTRAVENOUS at 09:34

## 2020-06-17 RX ADMIN — FENTANYL CITRATE 25 MCG: 50 INJECTION INTRAMUSCULAR; INTRAVENOUS at 10:04

## 2020-06-17 RX ADMIN — FENTANYL CITRATE 25 MCG: 50 INJECTION INTRAMUSCULAR; INTRAVENOUS at 10:14

## 2020-06-17 RX ADMIN — PROPOFOL 200 MG: 10 INJECTION, EMULSION INTRAVENOUS at 09:27

## 2020-06-17 RX ADMIN — MIDAZOLAM HYDROCHLORIDE 2 MG: 1 INJECTION, SOLUTION INTRAMUSCULAR; INTRAVENOUS at 09:24

## 2020-06-17 RX ADMIN — SODIUM CHLORIDE, POTASSIUM CHLORIDE, SODIUM LACTATE AND CALCIUM CHLORIDE: 600; 310; 30; 20 INJECTION, SOLUTION INTRAVENOUS at 09:12

## 2020-06-17 RX ADMIN — FENTANYL CITRATE 25 MCG: 50 INJECTION INTRAMUSCULAR; INTRAVENOUS at 09:37

## 2020-06-17 RX ADMIN — LIDOCAINE HYDROCHLORIDE 50 MG: 10 INJECTION, SOLUTION EPIDURAL; INFILTRATION; INTRACAUDAL; PERINEURAL at 09:27

## 2020-06-17 RX ADMIN — DEXAMETHASONE SODIUM PHOSPHATE 10 MG: 10 INJECTION INTRAMUSCULAR; INTRAVENOUS at 09:33

## 2020-06-17 RX ADMIN — FENTANYL CITRATE 25 MCG: 50 INJECTION INTRAMUSCULAR; INTRAVENOUS at 10:15

## 2020-06-17 RX ADMIN — ONDANSETRON 4 MG: 2 INJECTION, SOLUTION INTRAMUSCULAR; INTRAVENOUS at 10:35

## 2020-06-17 RX ADMIN — FENTANYL CITRATE 25 MCG: 50 INJECTION INTRAMUSCULAR; INTRAVENOUS at 09:33

## 2020-06-17 RX ADMIN — KETOROLAC TROMETHAMINE 30 MG: 30 INJECTION, SOLUTION INTRAMUSCULAR; INTRAVENOUS at 10:35

## 2020-06-17 RX ADMIN — FENTANYL CITRATE 25 MCG: 50 INJECTION INTRAMUSCULAR; INTRAVENOUS at 09:47

## 2020-06-17 ASSESSMENT — PULMONARY FUNCTION TESTS
PIF_VALUE: 20
PIF_VALUE: 18
PIF_VALUE: 21
PIF_VALUE: 1
PIF_VALUE: 18
PIF_VALUE: 21
PIF_VALUE: 18
PIF_VALUE: 19
PIF_VALUE: 18
PIF_VALUE: 20
PIF_VALUE: 20
PIF_VALUE: 21
PIF_VALUE: 20
PIF_VALUE: 19
PIF_VALUE: 0
PIF_VALUE: 20
PIF_VALUE: 19
PIF_VALUE: 18
PIF_VALUE: 20
PIF_VALUE: 18
PIF_VALUE: 19
PIF_VALUE: 1
PIF_VALUE: 18
PIF_VALUE: 20
PIF_VALUE: 1
PIF_VALUE: 20
PIF_VALUE: 2
PIF_VALUE: 20
PIF_VALUE: 21
PIF_VALUE: 20
PIF_VALUE: 19
PIF_VALUE: 20
PIF_VALUE: 17
PIF_VALUE: 19
PIF_VALUE: 2
PIF_VALUE: 18
PIF_VALUE: 21
PIF_VALUE: 19
PIF_VALUE: 20
PIF_VALUE: 20
PIF_VALUE: 19
PIF_VALUE: 19
PIF_VALUE: 20
PIF_VALUE: 19
PIF_VALUE: 20
PIF_VALUE: 18
PIF_VALUE: 20
PIF_VALUE: 14
PIF_VALUE: 19
PIF_VALUE: 18
PIF_VALUE: 20
PIF_VALUE: 1
PIF_VALUE: 19
PIF_VALUE: 19
PIF_VALUE: 20
PIF_VALUE: 21
PIF_VALUE: 18
PIF_VALUE: 19
PIF_VALUE: 19
PIF_VALUE: 20
PIF_VALUE: 17
PIF_VALUE: 19
PIF_VALUE: 20
PIF_VALUE: 19
PIF_VALUE: 1
PIF_VALUE: 18
PIF_VALUE: 19
PIF_VALUE: 2
PIF_VALUE: 18
PIF_VALUE: 1
PIF_VALUE: 1

## 2020-06-17 ASSESSMENT — PAIN SCALES - GENERAL
PAINLEVEL_OUTOF10: 0

## 2020-06-17 ASSESSMENT — PAIN - FUNCTIONAL ASSESSMENT: PAIN_FUNCTIONAL_ASSESSMENT: 0-10

## 2020-06-17 ASSESSMENT — ENCOUNTER SYMPTOMS
SHORTNESS OF BREATH: 0
STRIDOR: 0

## 2020-06-17 NOTE — H&P
Pre-op History and Physical  [address] Peraltanaldo Duvall PA-C    Patient:  Edouard López  MRN: [id number]  YOB: 1955    HISTORY OF PRESENT ILLNESS:     The patient is a 72 y.o. male who presents with right 7mm UVJ stone seen on CT when in [facility] ED. Here for cystoscopy, ureteroscopy, holmium laser lithotripsy with stent placement, RIGHT with Dr Hunter Duque. Has had kidney stones in past.    Patient's old records, notes and chart reviewed and summarized above. [address] Sel[address] Duvall PA-C independently reviewed the images and verified the radiology reports from:    No results found. 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, lower lobe    H/O arthroscopy of left knee     H/O seasonal allergies     Hearing aid worn     MICHAEL    Hematuria     Hyperlipidemia     Hypertension     Kidney stone     Left ureteral stone     Neoplasm of unspecified nature of bone, soft tissue, and skin 5/20/2014    lesion of left side of nose    Osteoarthritis     Snores     SOB (shortness of breath)     Type II or unspecified type diabetes mellitus without mention of complication, not stated as uncontrolled     Ureteral stent retained     in place x 2    Wears glasses        Past Surgical History:    Past Surgical History:   Procedure Laterality Date    BLADDER TUMOR EXCISION  2009    benign    BRONCHOSCOPY      COLONOSCOPY  09/08/2015    2 times with polypectomy- Dr. Copeland Nickel Left 9-21-15    2 stents in Lt.     CYSTOSCOPY  09/30/2015    HERNIA REPAIR Left     inguinal    KNEE ARTHROSCOPY      LITHOTRIPSY Left     x2    LITHOTRIPSY  09/30/15    laser    LITHOTRIPSY Left 01/10/2018    LUNG SURGERY Right 5/23/2011    right lower lobe \"lung carcinoid\" removal    MT FRAGMENT KIDNEY STONE/ [facility] Left 1/10/2018    [facility] [address] LITHOTRIPSY, POSSIBLE  STENT PLACEMENT (NEX MED CONF# [id number]) performed by Mare Solo MD at [address] [place] / [address] Left 6/9/2014    Excision lesion of nose. Dr. Rogelio Hicks.  SHOULDER SURGERY Right     closed manipulation    SHOULDER SURGERY Left     open manipulation    SKIN BIOPSY  11/12/2007    meir acilla and neck--squamous papillomas--lft buttock--subcutaneous abscess, back--compound nevus, lft neck--lentigo simplex, lft chest --junctional nevus, rt forearm--blue nevus    SKIN BIOPSY  12/9/2009    lft thigh--follicular cyst    SKIN BIOPSY  2/10/2010    rt thigh--ruptured epidermal inclusion cyst    TONSILLECTOMY      URETER STENT PLACEMENT Left 09/30/15    tiems 2 stents       Medications Prior to Admission:    Prior to Admission medications    Medication Sig Start Date End Date Taking? Authorizing Provider   tamsulosin (FLOMAX) 0.4 MG capsule Take 1 capsule by mouth daily for 5 days 6/14/20 6/19/20  Shayne Bailey, DO   acetaminophen (APAP EXTRA STRENGTH) 500 MG tablet Take 2 tablets by mouth every 6 hours as needed for Pain 6/14/20   Shayne Bailey, DO   ibuprofen (ADVIL;MOTRIN) 600 MG tablet Take 1 tablet by mouth every 6 hours as needed for Pain 6/14/20   Shaynearvind Bailey, DO   oxyCODONE (ROXICODONE) 5 MG immediate release tablet Take 1 tablet by mouth every 6 hours as needed for Pain for up to 3 days.  6/14/20 6/17/20  Shayne Bailey, DO   lovastatin (MEVACOR) 20 MG tablet Take 1 tablet by mouth nightly 4/13/20   HAMILTON Abrams - CNP   montelukast (SINGULAIR) 10 MG tablet  1/27/20   Historical Provider, MD   fenofibrate (TRICOR) 145 MG tablet Take 145 mg by mouth daily    Historical Provider, MD   FARXIGA 5 MG tablet Take 5 mg by mouth every morning  4/21/19   Historical Provider, MD   glimepiride (AMARYL) 4 MG tablet Take 4 mg by mouth 2 times daily  4/27/19   Historical Provider, MD Garcia Samples KWIKPEN 100 UNIT/ML injection pen Inject 60 Units into the skin partner: Not on file     Emotionally abused: Not on file     Physically abused: Not on file     Forced sexual activity: Not on file   Other Topics Concern    Not on file   Social History Narrative    Not on file       Family History:    Family History   Problem Relation Age of Onset    Heart Disease Mother     Diabetes Mother     Heart Disease Father     Diabetes Sister     Diabetes Maternal Grandmother        REVIEW OF SYSTEMS:  Constitutional: negative  Eyes: negative  Respiratory: negative  Cardiovascular: negative  Gastrointestinal: negative  Genitourinary: no acute issues  Musculoskeletal: negative  Skin: negative   Neurological: negative  Hematological/Lymphatic: negative  Psychological: negative    PHYSICAL EXAM:    No data found. Constitutional: Patient in NAD  Neuro: Alert and oriented to person, place, and time  Psych: Mood and affect normal  Skin: Clean, dry, intact   Lungs: Respiratory effort normal, CTA  Cardiovascular:  Normal peripheral pulses; no murmur. Normal rhythm  Abdomen: Soft, non-tender, non-distended, no hepatosplenomegaly or hernia, CVA tenderness right mild to moderately   Bladder: Non-tender and non-disdended   : Non-tender, skin intact, no lesions       LABS:   No results for input(s): WBC, HGB, HCT, MCV, PLT in the last 72 hours. No results for input(s): NA, K, CL, CO2, PHOS, BUN, CREATININE in the last 72 hours. Invalid input(s): CA  Lab Results   Component Value Date    PSA 0.18 04/05/2018    PSA 0.17 12/28/2017    PSA 0.20 08/07/2015         Urinalysis: No results for input(s): COLORU, PHUR, LABCAST, WBCUA, RBCUA, MUCUS, TRICHOMONAS, YEAST, BACTERIA, CLARITYU, SPECGRAV, LEUKOCYTESUR, UROBILINOGEN, [place]qu[place] in the last 72 hours.     Invalid input(s): NITRATE, GLUCOSEUKETONESUAMORPHOUS       -----------------------------------------------------------------    ASSESSMENT AND PLAN:    Impression:    Right 7mm UVJ stone    Plan: cystoscopy, ureteroscopy, holmium

## 2020-06-17 NOTE — OP NOTE
Operative Note    NAME: Edouard López   MRN: [id number]  : 1955  PROCEDURE DATE: 20    Surgeon: Binu Waldrop MD    Resident(s): Maddie Bruno MD-PGY3, Donavan Donovan MD-PGY2    Pre-op Diagnosis: Right obstructing 7 mm distal ureteral calculus, right urolithiasis    Post-op Diagnosis: Right obstructing 7 mm distal ureteral calculus, right urolithiasis     Procedure:   1. Cystourethroscopy. 2. Right-sided semirigid and flexible ureteroscopy. 3. Right-sided holmium laser lithotripsy. 4. Right-sided ureteral stent placement. Anesthesia: General    Antibiotics: 2000 mg IV Ancef         Indications:   Edouard López is a 72 y.o. male with aforementioned presentation. Risks, alternatives, and benefits were discussed and the patient elected to proceed. Informed consent was obtained. Findings: Left obstructing ureteral stone, right lower pole stone     Procedure details: The patient was brought back from the preoperative holding area to the operating room, and was transferred to the operating table. General anesthesia was induced appropriately. Preoperative antibiotics were given and EPC cuffs were placed and confirmed to be working. The patient was placed in dorsal lithotomy position and prepped and draped in the usual sterile fashion. Surgical timeout confirming patient, procedure, and positioning was performed. We began by inserting a rigid cystoscope with a 22 Upper sorbian sheath and 30 degree lens into the patient's urethral meatus and advancing into the bladder without complication. A complete cystoscopic evaluation of the bladder was performed in stepwise manner evaluating dome, left lateral wall, trigone/floor, posterior wall, and right lateral wall. No gross abnormalities were identified. We then focused our attention on the right ureteral orifice, which we cannulated with our 0.035 glidewire, and advanced up to renal pelvis.   We then used a dual lumen sheath to place a second wire,

## 2020-06-17 NOTE — ANESTHESIA PRE PROCEDURE
Department of Anesthesiology  Preprocedure Note       Name:  Helen Matson   Age:  72 y.o.  :  1955                                          MRN:  [id number]         Date:  2020      Surgeon: Guerita Miller):  Azul Quiñones MD    Procedure: cystoscopy, ureteroscopy, holmium laser lithotripsy with stent placement, RIGHT   Medications prior to admission:   Prior to Admission medications    Medication Sig Start Date End Date Taking? Authorizing Provider   tamsulosin (FLOMAX) 0.4 MG capsule Take 1 capsule by mouth daily for 5 days 20  [facility], DO   acetaminophen (APAP EXTRA STRENGTH) 500 MG tablet Take 2 tablets by mouth every 6 hours as needed for Pain 20   [facility], DO   ibuprofen (ADVIL;MOTRIN) 600 MG tablet Take 1 tablet by mouth every 6 hours as needed for Pain 20   [facility], DO   oxyCODONE (ROXICODONE) 5 MG immediate release tablet Take 1 tablet by mouth every 6 hours as needed for Pain for up to 3 days.  20  [place] Beena, DO   lovastatin (MEVACOR) 20 MG tablet Take 1 tablet by mouth nightly 20   Theyvette Chopra APRN - CNP   montelukast (SINGULAIR) 10 MG tablet  20   Historical Provider, MD   fenofibrate (TRICOR) 145 MG tablet Take 145 mg by mouth daily    Historical Provider, MD   FARXIGA 5 MG tablet Take 5 mg by mouth every morning  19   Historical Provider, MD   glimepiride (AMARYL) 4 MG tablet Take 4 mg by mouth 2 times daily  19   Historical Provider, MD Diane MENDIOLA 100 UNIT/ML injection pen Inject 60 Units into the skin daily  19   Historical Provider, MD   Insulin Pen Needle (B-D UF III MINI PEN NEEDLES) 31G X 5 MM MISC Inject 1 each into the skin daily 19   Indu Cummings MD   losartan (COZAAR) 50 MG tablet Take 1 tablet by mouth daily 19   Indu Cummings MD   VASCEPA 1 g CAPS capsule Take 2 capsules by mouth daily 19   Indu Cummings MD   aspirin 81 MG chewable tablet Take 1 tablet

## 2020-06-20 LAB
STONE COMPOSITION: NORMAL
STONE DESCRIPTION: NORMAL
STONE MASS: 19 MG
STONE NUMBER: 2
STONE SIZE: NORMAL MM

## 2020-06-21 ENCOUNTER — APPOINTMENT (OUTPATIENT)
Dept: CT IMAGING | Age: 65
End: 2020-06-21
Payer: COMMERCIAL

## 2020-06-21 ENCOUNTER — HOSPITAL ENCOUNTER (EMERGENCY)
Age: 65
Discharge: HOME | End: 2020-06-21
Attending: EMERGENCY MEDICINE
Payer: COMMERCIAL

## 2020-06-21 VITALS
DIASTOLIC BLOOD PRESSURE: 79 MMHG | OXYGEN SATURATION: 96 % | HEART RATE: 74 BPM | SYSTOLIC BLOOD PRESSURE: 151 MMHG | RESPIRATION RATE: 16 BRPM | TEMPERATURE: 97.7 F

## 2020-06-21 LAB
-: ABNORMAL
ABSOLUTE EOS #: 0.21 K/UL (ref 0–0.4)
ABSOLUTE IMMATURE GRANULOCYTE: ABNORMAL K/UL (ref 0–0.3)
ABSOLUTE LYMPH #: 1.13 K/UL (ref 1–4.8)
ABSOLUTE MONO #: 0.82 K/UL (ref 0.1–1.3)
ALBUMIN SERPL-MCNC: 4.4 G/DL (ref 3.5–5.2)
ALBUMIN/GLOBULIN RATIO: ABNORMAL (ref 1–2.5)
ALP BLD-CCNC: 43 U/L (ref 40–129)
ALT SERPL-CCNC: 19 U/L (ref 5–41)
AMORPHOUS: ABNORMAL
ANION GAP SERPL CALCULATED.3IONS-SCNC: 12 MMOL/L (ref 9–17)
AST SERPL-CCNC: 14 U/L
BACTERIA: ABNORMAL
BASOPHILS # BLD: 1 % (ref 0–2)
BASOPHILS ABSOLUTE: 0.1 K/UL (ref 0–0.2)
BILIRUB SERPL-MCNC: 0.66 MG/DL (ref 0.3–1.2)
BILIRUBIN URINE: NEGATIVE
BUN BLDV-MCNC: 22 MG/DL (ref 8–23)
BUN/CREAT BLD: ABNORMAL (ref 9–20)
CALCIUM SERPL-MCNC: 10 MG/DL (ref 8.6–10.4)
CASTS UA: ABNORMAL /LPF
CHLORIDE BLD-SCNC: 102 MMOL/L (ref 98–107)
CO2: 22 MMOL/L (ref 20–31)
COLOR: YELLOW
COMMENT UA: ABNORMAL
CREAT SERPL-MCNC: 0.94 MG/DL (ref 0.7–1.2)
CRYSTALS, UA: ABNORMAL /HPF
DIFFERENTIAL TYPE: ABNORMAL
EOSINOPHILS RELATIVE PERCENT: 2 % (ref 0–4)
EPITHELIAL CELLS UA: ABNORMAL /HPF
GFR AFRICAN AMERICAN: >60 ML/MIN
GFR NON-AFRICAN AMERICAN: >60 ML/MIN
GFR SERPL CREATININE-BSD FRML MDRD: ABNORMAL ML/MIN/{1.73_M2}
GFR SERPL CREATININE-BSD FRML MDRD: ABNORMAL ML/MIN/{1.73_M2}
GLUCOSE BLD-MCNC: 249 MG/DL (ref 70–99)
GLUCOSE URINE: ABNORMAL
HCT VFR BLD CALC: 51 % (ref 41–53)
HEMOGLOBIN: 17 G/DL (ref 13.5–17.5)
IMMATURE GRANULOCYTES: ABNORMAL %
KETONES, URINE: NEGATIVE
LEUKOCYTE ESTERASE, URINE: ABNORMAL
LYMPHOCYTES # BLD: 11 % (ref 24–44)
MCH RBC QN AUTO: 30.1 PG (ref 26–34)
MCHC RBC AUTO-ENTMCNC: 33.4 G/DL (ref 31–37)
MCV RBC AUTO: 90.2 FL (ref 80–100)
MONOCYTES # BLD: 8 % (ref 1–7)
MORPHOLOGY: NORMAL
MUCUS: ABNORMAL
NITRITE, URINE: NEGATIVE
NRBC AUTOMATED: ABNORMAL PER 100 WBC
OTHER OBSERVATIONS UA: ABNORMAL
PDW BLD-RTO: 13.3 % (ref 11.5–14.9)
PH UA: 5 (ref 5–8)
PLATELET # BLD: 219 K/UL (ref 150–450)
PLATELET ESTIMATE: ABNORMAL
PMV BLD AUTO: 8.8 FL (ref 6–12)
POTASSIUM SERPL-SCNC: 4.8 MMOL/L (ref 3.7–5.3)
PROTEIN UA: ABNORMAL
RBC # BLD: 5.65 M/UL (ref 4.5–5.9)
RBC # BLD: ABNORMAL 10*6/UL
RBC UA: ABNORMAL /HPF
RENAL EPITHELIAL, UA: ABNORMAL /HPF
SEG NEUTROPHILS: 78 % (ref 36–66)
SEGMENTED NEUTROPHILS ABSOLUTE COUNT: 8.04 K/UL (ref 1.3–9.1)
SODIUM BLD-SCNC: 136 MMOL/L (ref 135–144)
SPECIFIC GRAVITY UA: 1.03 (ref 1–1.03)
TOTAL PROTEIN: 7.5 G/DL (ref 6.4–8.3)
TRICHOMONAS: ABNORMAL
TURBIDITY: ABNORMAL
URINE HGB: ABNORMAL
UROBILINOGEN, URINE: NORMAL
WBC # BLD: 10.3 K/UL (ref 3.5–11)
WBC # BLD: ABNORMAL 10*3/UL
WBC UA: ABNORMAL /HPF
YEAST: ABNORMAL

## 2020-06-21 PROCEDURE — 87086 URINE CULTURE/COLONY COUNT: CPT

## 2020-06-21 PROCEDURE — 85025 COMPLETE CBC W/AUTO DIFF WBC: CPT

## 2020-06-21 PROCEDURE — 80053 COMPREHEN METABOLIC PANEL: CPT

## 2020-06-21 PROCEDURE — 99284 EMERGENCY DEPT VISIT MOD MDM: CPT

## 2020-06-21 PROCEDURE — 6360000002 HC RX W HCPCS: Performed by: EMERGENCY MEDICINE

## 2020-06-21 PROCEDURE — 96375 TX/PRO/DX INJ NEW DRUG ADDON: CPT

## 2020-06-21 PROCEDURE — 81001 URINALYSIS AUTO W/SCOPE: CPT

## 2020-06-21 PROCEDURE — 2580000003 HC RX 258: Performed by: EMERGENCY MEDICINE

## 2020-06-21 PROCEDURE — 96374 THER/PROPH/DIAG INJ IV PUSH: CPT

## 2020-06-21 PROCEDURE — 74176 CT ABD & PELVIS W/O CONTRAST: CPT

## 2020-06-21 PROCEDURE — 36415 COLL VENOUS BLD VENIPUNCTURE: CPT

## 2020-06-21 RX ORDER — KETOROLAC TROMETHAMINE 30 MG/ML
30 INJECTION, SOLUTION INTRAMUSCULAR; INTRAVENOUS ONCE
Status: COMPLETED | OUTPATIENT
Start: 2020-06-21 | End: 2020-06-21

## 2020-06-21 RX ORDER — 0.9 % SODIUM CHLORIDE 0.9 %
1000 INTRAVENOUS SOLUTION INTRAVENOUS ONCE
Status: COMPLETED | OUTPATIENT
Start: 2020-06-21 | End: 2020-06-21

## 2020-06-21 RX ORDER — FENTANYL CITRATE 50 UG/ML
100 INJECTION, SOLUTION INTRAMUSCULAR; INTRAVENOUS ONCE
Status: COMPLETED | OUTPATIENT
Start: 2020-06-21 | End: 2020-06-21

## 2020-06-21 RX ORDER — HYDROCODONE BITARTRATE AND ACETAMINOPHEN 5; 325 MG/1; MG/1
1 TABLET ORAL EVERY 6 HOURS PRN
Qty: 10 TABLET | Refills: 0 | Status: SHIPPED | OUTPATIENT
Start: 2020-06-21 | End: 2020-06-22 | Stop reason: ALTCHOICE

## 2020-06-21 RX ADMIN — FENTANYL CITRATE 100 MCG: 50 INJECTION, SOLUTION INTRAMUSCULAR; INTRAVENOUS at 10:43

## 2020-06-21 RX ADMIN — KETOROLAC TROMETHAMINE 30 MG: 30 INJECTION, SOLUTION INTRAMUSCULAR at 12:16

## 2020-06-21 RX ADMIN — SODIUM CHLORIDE 1000 ML: 9 INJECTION, SOLUTION INTRAVENOUS at 10:43

## 2020-06-21 RX ADMIN — SODIUM CHLORIDE 1000 ML: 9 INJECTION, SOLUTION INTRAVENOUS at 12:16

## 2020-06-21 ASSESSMENT — PAIN SCALES - GENERAL
PAINLEVEL_OUTOF10: 2
PAINLEVEL_OUTOF10: 9
PAINLEVEL_OUTOF10: 2
PAINLEVEL_OUTOF10: 9
PAINLEVEL_OUTOF10: 1

## 2020-06-21 ASSESSMENT — ENCOUNTER SYMPTOMS
COUGH: 0
SHORTNESS OF BREATH: 0
NAUSEA: 1
ABDOMINAL PAIN: 1
VOMITING: 1

## 2020-06-21 ASSESSMENT — PAIN DESCRIPTION - LOCATION: LOCATION: FLANK

## 2020-06-21 ASSESSMENT — PAIN DESCRIPTION - ORIENTATION: ORIENTATION: RIGHT

## 2020-06-21 NOTE — ED NOTES
Pt is brought to this ER by his wife with rt flank pain and hematuria. Pt states he was seen here in the ER this past Saturday the 17th, dx with a 6mm kidney stone, laser surgery by Dr Evin Fields that same day, and a stent was placed. Pt states he was having a difficult time at home because he could not sleep d/t having to urinate [id number]. Because of this, the pt states he pulled his urinary stent out this morning thinking that would help his urinating problem. Pt states shortly thereafter, the pt started having moderate to severe rt flank pain, and he started having blood in his urine. Pt arrives A+O x 4, GCS = 15, PMS x 4 intact, eupneic, and PWD. Abdomen is soft et nondistended. Pt is having appropriate conversation with this nurse.      Claudell Hansen, RN  06/21/20 0394

## 2020-06-22 ENCOUNTER — OFFICE VISIT (OUTPATIENT)
Dept: UROLOGY | Age: 65
End: 2020-06-22
Payer: COMMERCIAL

## 2020-06-22 ENCOUNTER — CARE COORDINATION (OUTPATIENT)
Dept: CARE COORDINATION | Age: 65
End: 2020-06-22

## 2020-06-22 VITALS — TEMPERATURE: 97.9 F

## 2020-06-22 LAB
CULTURE: NO GROWTH
Lab: NORMAL
SPECIMEN DESCRIPTION: NORMAL

## 2020-06-22 PROCEDURE — 99214 OFFICE O/P EST MOD 30 MIN: CPT | Performed by: UROLOGY

## 2020-06-22 ASSESSMENT — ENCOUNTER SYMPTOMS
CONSTIPATION: 0
COUGH: 0
ABDOMINAL PAIN: 0
WHEEZING: 0
EYE PAIN: 0
VOMITING: 0
EYE REDNESS: 0
DIARRHEA: 0
BACK PAIN: 1
NAUSEA: 0
SHORTNESS OF BREATH: 0

## 2020-06-24 ENCOUNTER — CARE COORDINATION (OUTPATIENT)
Dept: CARE COORDINATION | Age: 65
End: 2020-06-24

## 2020-07-30 RX ORDER — FENOFIBRATE 145 MG/1
145 TABLET, COATED ORAL DAILY
Qty: 90 TABLET | Refills: 3 | Status: SHIPPED | OUTPATIENT
Start: 2020-07-30 | End: 2020-08-21 | Stop reason: SDUPTHER

## 2020-08-01 ENCOUNTER — HOSPITAL ENCOUNTER (OUTPATIENT)
Dept: ULTRASOUND IMAGING | Age: 65
Discharge: HOME | End: 2020-08-03
Payer: COMMERCIAL

## 2020-08-01 PROCEDURE — 76775 US EXAM ABDO BACK WALL LIM: CPT

## 2020-08-03 ENCOUNTER — OFFICE VISIT (OUTPATIENT)
Dept: UROLOGY | Age: 65
End: 2020-08-03
Payer: COMMERCIAL

## 2020-08-03 VITALS — TEMPERATURE: 98.1 F

## 2020-08-03 PROCEDURE — 99213 OFFICE O/P EST LOW 20 MIN: CPT | Performed by: UROLOGY

## 2020-08-03 ASSESSMENT — ENCOUNTER SYMPTOMS
EYE REDNESS: 0
NAUSEA: 0
BACK PAIN: 0
COUGH: 0
WHEEZING: 0
ABDOMINAL PAIN: 0
VOMITING: 0
SHORTNESS OF BREATH: 0
DIARRHEA: 1
CONSTIPATION: 0
EYE PAIN: 0

## 2020-08-03 NOTE — PROGRESS NOTES
[facility]  [facility] - [facility] [address] [facility] [address]  Dept: [address] [facility] Urology Office Note - Established    Patient:  Nenita Garrison  YOB: 1955  Date: 8/3/2020    The patient is a 72 y.o. male who presents todayfor evaluation of the following problems:   Chief Complaint   Patient presents with    Nephrolithiasis     6 weeks f/u with US        HPI  Kidney calculus:  Patient is here today for a kidney calculus which was first noted a few year(s) ago. Location: Left upper and lower pole  Size: multiple mm  Current medical Rx for renal calculus: none  Passed recent calculus? No  Stone composition: unknown  Flank pain? no  Hematuria? none     Had recent right urs/hll    Lab Results   Component Value Date    CALCIUM 10.0 06/21/2020    CAION 1.30 11/25/2019    PHOS 3.5 11/25/2019     Lab Results   Component Value Date     06/21/2020    K 4.8 06/21/2020     06/21/2020    CO2 22 06/21/2020         Summary of old records: N/A    Additional History: N/A    Procedures Today: N/A    Urinalysis today:  No results found for this visit on 08/03/20.   Last several PSA's:  Lab Results   Component Value Date    PSA 0.18 04/05/2018    PSA 0.17 12/28/2017    PSA 0.20 08/07/2015     Last total testosterone:  No results found for: TESTOSTERONE    AUA Symptom Score (8/3/2020):  INCOMPLETE EMPTYING: How often have you had the sensation of not emptying your bladder?: Not at all  FREQUENCY: How often do you have to urinate less than every two hours?: Almost always  INTERMITTENCY: How often have you found you stopped and started again several times when you urinated?: About Half the time  URGENCY: How often have you found it difficult to postpone urination?: Less than 1 to 5 times  WEAK STREAM: How often have you had a weak urinary stream?: Not at all  STRAINING: How often have you had to strain to start  urination?: Not at all  NOCTURIA: How LITHOTRIPSY  09/30/15    laser    LITHOTRIPSY Left 01/10/2018    LUNG SURGERY Right 5/23/2011    right lower lobe \"lung carcinoid\" removal    ID FRAGMENT KIDNEY STONE/ ESWL Left 1/10/2018    ESWL EXTRACORPEAL SHOCK WAVE LITHOTRIPSY, POSSIBLE  STENT PLACEMENT ([facility] CONF# [id number]) performed by Suhail Márquez MD at [address] [place] / [address] Left 6/9/2014    Excision lesion of nose. Dr. Mauricio Godoy.     SHOULDER SURGERY Right     closed manipulation    SHOULDER SURGERY Left     open manipulation    SKIN BIOPSY  11/12/2007    meir acilla and neck--squamous papillomas--lft buttock--subcutaneous abscess, back--compound nevus, lft neck--lentigo simplex, lft chest --junctional nevus, rt forearm--blue nevus    SKIN BIOPSY  12/9/2009    lft thigh--follicular cyst    SKIN BIOPSY  2/10/2010    rt thigh--ruptured epidermal inclusion cyst    TONSILLECTOMY      URETER STENT PLACEMENT Left 09/30/15    tiems 2 stents     Family History   Problem Relation Age of Onset    Heart Disease Mother     Diabetes Mother     Heart Disease Father     Diabetes Sister     Diabetes Maternal Grandmother      Outpatient Medications Marked as Taking for the 8/3/20 encounter (Office Visit) with Suhail Márquez MD   Medication Sig Dispense Refill    fenofibrate (TRICOR) 145 MG tablet Take 1 tablet by mouth daily 90 tablet 3    B-D UF III MINI PEN NEEDLES 31G X 5 MM MISC INJECT 1 PEN NEEDLE INTO   THE SKIN DAILY 100 each 3    tamsulosin (FLOMAX) 0.4 MG capsule Take 1 capsule by mouth daily 14 capsule 0    acetaminophen (APAP EXTRA STRENGTH) 500 MG tablet Take 2 tablets by mouth every 6 hours as needed for Pain 30 tablet 0    ibuprofen (ADVIL;MOTRIN) 600 MG tablet Take 1 tablet by mouth every 6 hours as needed for Pain 30 tablet 0    lovastatin (MEVACOR) 20 MG tablet Take 1 tablet by mouth nightly 90 tablet 3    montelukast (SINGULAIR) 10 MG tablet       FARXIGA 5 MG tablet Take 5 mg by mouth every morning       glimepiride (AMARYL) 4 MG tablet Take 4 mg by mouth 2 times daily       BASAGLAR KWIKPEN 100 UNIT/ML injection pen Inject 60 Units into the skin daily       losartan (COZAAR) 50 MG tablet Take 1 tablet by mouth daily 90 tablet 3    VASCEPA 1 g CAPS capsule Take 2 capsules by mouth daily 180 capsule 3    aspirin 81 MG chewable tablet Take 1 tablet by mouth daily Hold for 5 days after surgery, until 1/16/18 (Patient taking differently: Take 81 mg by mouth daily ) 30 tablet 0    VENTOLIN  (90 BASE) MCG/ACT inhaler inhale 2 puff every 4 hours prn  0    JANUMET XR  MG TB24 tablet Take 2 tablets by mouth daily         Seasonal  Social History     Tobacco Use   Smoking Status Never Smoker   Smokeless Tobacco Never Used     (Ifpatient a smoker, smoking cessation counseling offered)    Social History     Substance and Sexual Activity   Alcohol Use Yes    Comment: once a week       REVIEW OF SYSTEMS:  Review of Systems    Physical Exam:      Vitals:    08/03/20 1017   Temp: 98.1 °F (36.7 °C)     There is no height or weight on file to calculate BMI. Patient is a 72 y.o. male in no acute distress and alert and oriented to person, place and time. Physical Exam  Constitutional: Patient in no acute distress. Neuro: Alert and oriented to person, place and time. Psych: Mood normal, affect normal  Skin: No rash noted  HEENT: Head: Normocephalic andatraumatic  Conjunctivae and EOM are normal. Pupils are equal, round  Nose:Normal  Right External Ear: Normal; Left External Ear: Normal  Mouth: Mucosa Moist  Neck: Supple  Lungs: Respiratory effort is normal  Cardiovascular: Warm & Pink  Abdomen: Soft, non-tender, non-distended with no CVA,  No flank tenderness,  Or hepatosplenomegaly   Lymphatics: No palpablelymphadenopathy. Bladder non-tender and not distended. Assessment and Plan      1.  Kidney stones           Plan:   Reviewed lithamanda  Discussed with patient  Spent 20 min face to face

## 2020-08-21 ENCOUNTER — OFFICE VISIT (OUTPATIENT)
Dept: PRIMARY CARE CLINIC | Age: 65
End: 2020-08-21
Payer: COMMERCIAL

## 2020-08-21 VITALS
BODY MASS INDEX: 30.6 KG/M2 | WEIGHT: 225.6 LBS | SYSTOLIC BLOOD PRESSURE: 130 MMHG | TEMPERATURE: 97.6 F | OXYGEN SATURATION: 98 % | HEART RATE: 84 BPM | DIASTOLIC BLOOD PRESSURE: 68 MMHG

## 2020-08-21 PROCEDURE — 90471 IMMUNIZATION ADMIN: CPT | Performed by: FAMILY MEDICINE

## 2020-08-21 PROCEDURE — 3051F HG A1C>EQUAL 7.0%<8.0%: CPT | Performed by: FAMILY MEDICINE

## 2020-08-21 PROCEDURE — 99214 OFFICE O/P EST MOD 30 MIN: CPT | Performed by: FAMILY MEDICINE

## 2020-08-21 PROCEDURE — 90715 TDAP VACCINE 7 YRS/> IM: CPT | Performed by: FAMILY MEDICINE

## 2020-08-21 RX ORDER — FENOFIBRATE 145 MG/1
145 TABLET, COATED ORAL DAILY
Qty: 90 TABLET | Refills: 3 | Status: SHIPPED | OUTPATIENT
Start: 2020-08-21 | End: 2021-08-23

## 2020-08-21 ASSESSMENT — PATIENT HEALTH QUESTIONNAIRE - PHQ9
2. FEELING DOWN, DEPRESSED OR HOPELESS: 0
1. LITTLE INTEREST OR PLEASURE IN DOING THINGS: 0
SUM OF ALL RESPONSES TO PHQ QUESTIONS 1-9: 0
SUM OF ALL RESPONSES TO PHQ QUESTIONS 1-9: 0
SUM OF ALL RESPONSES TO PHQ9 QUESTIONS 1 & 2: 0

## 2020-08-21 ASSESSMENT — ENCOUNTER SYMPTOMS: SHORTNESS OF BREATH: 1

## 2020-08-21 NOTE — PROGRESS NOTES
Luana Chua a 72 y.o. male who presents today for his medical conditions/complaints as notedbelow. Chief Complaint   Patient presents with    6 Month Follow-Up     HTN f/u         HPI:   Hypertension   This is a chronic problem. The current episode started more than 1 year ago. The problem is unchanged. The problem is controlled. Associated symptoms include shortness of breath (with over exertion). Pertinent negatives include no chest pain or palpitations. There are no associated agents to hypertension. Risk factors for coronary artery disease include male gender and obesity. Past treatments include angiotensin blockers. The current treatment provides significant improvement. There are no compliance problems.         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, lower lobe    H/O arthroscopy of left knee     H/O seasonal allergies     Hearing aid worn     MICHAEL    Hematuria     Hx of lithotripsy     Hyperlipidemia     Hypertension     Kidney stone     Left ureteral stone     Neoplasm of unspecified nature of bone, soft tissue, 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 Haim Carreon Right 6/17/2020    HOLMIUM LASER, CYSTOSCOPY, URETEROSCOPY, STENT PLACEMENT performed by Axel Seay MD at [address] [facility] Left 9-21-15    2 stents in Lt.  CYSTOSCOPY  09/30/2015    CYSTOSCOPY Right 06/17/2020    HOLMIUM LASER, CYSTOSCOPY, URETEROSCOPY, STENT PLACEMENT     HERNIA REPAIR Left     inguinal    KNEE ARTHROSCOPY      LITHOTRIPSY Left     x2    LITHOTRIPSY  09/30/15    laser    LITHOTRIPSY Left 01/10/2018    LUNG SURGERY Right 5/23/2011    right lower lobe \"lung carcinoid\" removal    IN FRAGMENT KIDNEY STONE/ ESWL Left 1/10/2018    ESWL EXTRACORPEAL SHOCK WAVE LITHOTRIPSY, POSSIBLE  STENT PLACEMENT ([employer] [facility] CONF# [id number]) performed by Axel Seay MD at [address] [place] / [address] Left 6/9/2014    Excision lesion of nose. Dr. Mary Ellen Cortez.     SHOULDER SURGERY Right     closed manipulation    SHOULDER SURGERY Left     open manipulation    SKIN BIOPSY  11/12/2007    meir acilla and neck--squamous papillomas--lft buttock--subcutaneous abscess, back--compound nevus, lft neck--lentigo simplex, lft chest --junctional nevus, rt forearm--blue nevus    SKIN BIOPSY  12/9/2009    lft thigh--follicular cyst    SKIN BIOPSY  2/10/2010    rt thigh--ruptured epidermal inclusion cyst    TONSILLECTOMY      URETER STENT PLACEMENT Left 09/30/15    tiems 2 stents       Family History   Problem Relation Age of Onset    Heart Disease Mother     Diabetes Mother     Heart Disease Father     Diabetes Sister     Diabetes Maternal Grandmother        Social History     Tobacco Use    Smoking status: Never Smoker    Smokeless tobacco: Never Used   Substance Use Topics    Alcohol use: Yes     Comment: once a week      Current Outpatient Medications   Medication Sig Dispense Refill    fenofibrate (TRICOR) 145 MG tablet Take 1 tablet by mouth daily 90 tablet 3    B-D UF III MINI PEN NEEDLES 31G X 5 MM MISC INJECT 1 PEN NEEDLE INTO   THE SKIN DAILY 100 each 3    ibuprofen (ADVIL;MOTRIN) 600 MG tablet Take 1 tablet by mouth every 6 hours as needed for Pain 30 tablet 0    lovastatin (MEVACOR) 20 MG tablet Take 1 tablet by mouth nightly 90 tablet 3    montelukast (SINGULAIR) 10 MG tablet       FARXIGA 5 MG tablet Take 5 mg by mouth every morning       glimepiride (AMARYL) 4 MG tablet Take 4 mg by mouth 2 times daily       BASAGLAR KWIKPEN 100 UNIT/ML injection pen Inject 60 Units into the skin daily       losartan (COZAAR) 50 MG tablet Take 1 tablet by mouth daily 90 tablet 3    VASCEPA 1 g CAPS capsule Take 2 capsules by mouth daily 180 capsule 3    aspirin 81 MG chewable tablet Take 1 tablet by mouth daily Hold for 5 days after surgery, until 1/16/18 (Patient taking differently: Take 81 mg by mouth daily ) 30 tablet 0    VENTOLIN  (90 BASE) MCG/ACT inhaler inhale 2 puff every 4 hours prn  0    JANUMET XR  MG TB24 tablet Take 2 tablets by mouth daily      tamsulosin (FLOMAX) 0.4 MG capsule Take 1 capsule by mouth daily (Patient not taking: Reported on 8/21/2020) 14 capsule 0    acetaminophen (APAP EXTRA STRENGTH) 500 MG tablet Take 2 tablets by mouth every 6 hours as needed for Pain (Patient not taking: Reported on 8/21/2020) 30 tablet 0     No current facility-administered medications for this visit.       Allergies   Allergen Reactions    Seasonal        Health Maintenance   Topic Date Due    Hepatitis C screen  1955    Hepatitis A vaccine (1 of 2 - Risk 2-dose series) 02/19/1956    HIV screen  02/19/1970    DTaP/Tdap/Td vaccine (1 - Tdap) 02/19/1974    Shingles Vaccine (1 of 2) 02/19/2005    Annual Wellness Visit (AWV)  08/01/2020    Flu vaccine (1) 09/01/2020    Diabetic microalbuminuria test  11/15/2020    Lipid screen  12/23/2020    Diabetic foot exam  02/20/2021    A1C test (Diabetic or Prediabetic)  02/20/2021    Diabetic retinal exam  02/20/2021    Potassium monitoring  06/21/2021    Creatinine monitoring  06/21/2021    Pneumococcal 65+ years Vaccine (2 of 2 - PPSV23) 12/01/2024    Colon cancer screen colonoscopy  09/08/2025    Hib vaccine  Aged Out    Meningococcal (ACWY) vaccine  Aged Out       Subjective:      Review of Systems   Respiratory: Positive for shortness of breath (with over exertion). Cardiovascular: Negative for chest pain and palpitations. Neurological: Positive for dizziness (if  he gets up too quick if lays flat. ). Objective:     /68   Pulse 84   Temp 97.6 °F (36.4 °C)   Wt 225 lb 9.6 oz (102.3 kg)   SpO2 98%   BMI 30.60 kg/m²   Physical Exam  Vitals signs and nursing note reviewed. Constitutional:       General: He is not in acute distress. Appearance: Normal appearance. He is well-developed. He is not ill-appearing. HENT:      Head: Normocephalic and atraumatic. Right Ear: Tympanic membrane, ear canal and external ear normal.      Left Ear: Tympanic membrane, ear canal and external ear normal.   Eyes:      General: No scleral icterus. Right eye: No discharge. Left eye: No discharge. Conjunctiva/sclera: Conjunctivae normal.      Pupils: Pupils are equal, round, and reactive to light. Neck:      Thyroid: No thyromegaly. Trachea: No tracheal deviation. Cardiovascular:      Rate and Rhythm: Normal rate and regular rhythm. Heart sounds: Normal heart sounds. Comments: Varicose veins  Pulmonary:      Effort: Pulmonary effort is normal. No respiratory distress. Breath sounds: Normal breath sounds. No wheezing. Comments: Decreased breath sounds in right base  Musculoskeletal:      Right lower leg: No edema. Left lower leg: No edema. Lymphadenopathy:      Cervical: No cervical adenopathy. Skin:     General: Skin is warm. Findings: No rash.    Neurological:      Mental Status: He is alert and oriented to person, place, and time. Psychiatric:         Mood and Affect: Mood normal.         Behavior: Behavior normal.         Thought Content: Thought content normal.         Assessment:       Diagnosis Orders   1. Essential hypertension  Lipid Panel   2. Type 2 diabetes mellitus without complication, with long-term current use of insulin ([address]     3. Mild intermittent asthma without complication     4. Immunization due  Tdap (age 6y and older) IM (Boostrix)        Plan:      Return in about 6 months (around 2/21/2021) for hypertension. Due for pneumovax 23 again next year since it looks like he got it before 72 ( from DM doc office )    Orders Placed This Encounter   Procedures    Tdap (age 6y and older) IM (Boostrix)     72 and over    Lipid Panel     Standing Status:   Future     Standing Expiration Date:   8/21/2021     Order Specific Question:   Is Patient Fasting?/# of Hours     Answer:   yes     Orders Placed This Encounter   Medications    fenofibrate (TRICOR) 145 MG tablet     Sig: Take 1 tablet by mouth daily     Dispense:  90 tablet     Refill:  3       Patient given educational materials - see patient instructions. Discussed use, benefit, and side effects of prescribed medications. All patient questions answered. Pt voiced understanding. Reviewed health maintenance. Instructed to continue current medications, diet. Patient agreed with treatment plan. Follow up as directed.      Electronicallysigned by Bossman Nowak MD on 8/21/2020 at 10:12 AM

## 2020-08-21 NOTE — PATIENT INSTRUCTIONS
Patient Education        tetanus, diphtheria, acellular pertussis vaccine (Tdap)  Pronunciation:  TET a nus, dif THEER iza a, and ay SANDRA galloo yulisa per TUS iss  Brand:  Adacel (Tdap), Boostrix (Tdap)  What is the most important information I should know about this vaccine? You should not receive this vaccine if you have ever had had a life-threatening allergic reaction to a tetanus, diphtheria, or pertussis vaccine. You also should not receive this vaccine if you had a neurologic disorder affecting your brain within 7 days after having a previous pertussis vaccine. What is tetanus, diphtheria, acellular pertussis vaccine (Tdap)? Tetanus, diphtheria, and pertussis are serious diseases caused by bacteria. Tetanus (lockjaw) causes painful tightening of the muscles, usually all over the body. It can lead to \"locking\" of the jaw so the victim cannot open the mouth or swallow. Tetanus leads to death in about 1 out of 10 cases. Diphtheria causes a thick coating in the nose, throat, and airways. It can lead to breathing problems, paralysis, heart failure, or death. Pertussis (whooping cough) causes coughing so severe that it interferes with eating, drinking, or breathing. These spells can last for weeks and can lead to pneumonia, seizures (convulsions), brain damage, and death. Diphtheria and pertussis are spread from person to person. Tetanus enters the body through a cut or wound. The diphtheria, tetanus acellular, and pertussis adult vaccine (also called Tdap) is used to help prevent these diseases in people who are at least 8years old. Most people in this age group require only one Tdap shot for protection against these diseases. Tdap vaccine is especially important for healthcare workers or people who have close contact with a baby younger than 13 months old.   This vaccine works by exposing you to a small dose of the bacteria or a protein from the bacteria, which causes the body to develop immunity to the disease. This vaccine will not treat an active infection that has already developed in the body. Like any vaccine, the Tdap vaccine may not provide protection from disease in every person. What should I discuss with my healthcare provider before receiving this vaccine? You should not receive this vaccine if:  · you had a life-threatening allergic reaction to any vaccine that contains tetanus, diphtheria, or pertussis; or  · you had a neurologic disorder affecting your brain (such as loss of consciousness or a prolonged seizure) within 7 days after having a previous pertussis vaccine. You may not be able to receive a Tdap vaccine if you have ever received a similar vaccine that caused any of the following:  · a very high fever (over 104 degrees Fahrenheit);  · a neurologic disorder or disease affecting the brain;  · fainting or going into shock;  · severe pain, redness, tenderness, swelling, or a lump where the shot was given;  · an allergy to latex rubber;  · severe or uncontrolled epilepsy or other seizure disorder; or  · Guillain-Barré syndrome (within 6 weeks after receiving a vaccine containing tetanus). If you have any of these other conditions, your vaccine may need to be postponed or not given at all:  · a history of seizures;  · a weak immune system caused by disease, bone marrow transplant, or by using certain medicines or receiving cancer treatments; or  · if it has been less than 10 years since you last received a tetanus shot. You can still receive a vaccine if you have a minor cold. In the case of a more severe illness with a fever or any type of infection, wait until you get better before receiving this vaccine. It is not known whether Tdap vaccine will harm an unborn baby. However, you may need a Tdap vaccine during pregnancy to protect your  baby from pertussis. [employer] babies are most at risk for severe, life-threatening complications from pertussis.  Your doctor should determine whether you need this vaccine during pregnancy. If you are pregnant, your name may be listed on a pregnancy registry. This is to track the outcome of the pregnancy and to evaluate any effects of the Tdap vaccine on the baby. It is not known whether Tdap vaccine passes into breast milk or if it could harm a nursing baby. Tell your doctor if you are breast-feeding a baby. The adult version of this vaccine (Adacel, Boostrix) should not be given to anyone under the age of 8. Another vaccine is available for use in children younger than 8years old. How is this vaccine given? This vaccine is given as an injection (shot) into a muscle. You will receive this injection in a doctor's office or clinic setting. Tdap vaccine is usually given as a one-time injection. Unless your doctor's tells you otherwise, you will not need a booster vaccine. Tdap vaccine is usually given once every 10 years. What happens if I miss a dose? Since the Tdap vaccine is usually given only once, you are not likely to miss a dose. What happens if I overdose? An overdose of this vaccine is unlikely to occur. What should I avoid before or after receiving this vaccine? Follow your doctor's instructions about any restrictions on food, beverages, or activity after receiving a Tdap vaccine. What are the possible side effects of this vaccine? Keep track of any and all side effects you have after receiving this vaccine. If you ever need to receive a booster dose, you will need to tell your doctor if the previous shot caused any side effects. You should not receive a booster vaccine if you had a life threatening allergic reaction after the first shot. Becoming infected with diphtheria, pertussis, or tetanus is much more dangerous to your health than receiving this vaccine. However, like any medicine, this vaccine can cause side effects but the risk of serious side effects is extremely low.   Get emergency medical help if you have signs of an allergic reaction: hives; difficult breathing; swelling of your face, lips, tongue, or throat. Call your doctor at once if you have any of these side effects within 7 days after receiving Tdap vaccine:  · numbness, weakness, or tingling in your feet and legs;  · problems with walking or coordination;  · sudden pain in your arms or shoulders;  · a light-headed feeling, like you might pass out;  · vision problems, ringing in your ears;  · seizure (black-out or convulsions); or  · redness, swelling, bleeding, or severe pain where the shot was given. Common side effects may include:  · mild pain or tenderness where the shot was given;  · headache or tiredness;  · body aches; or  · mild nausea, diarrhea, or vomiting. This is not a complete list of side effects and others may occur. Call your doctor for medical advice about side effects. You may report vaccine side effects to the [place] [address] and Human Services at [phone]. What other drugs will affect tetanus, diphtheria, acellular pertussis vaccine? Before receiving this vaccine, tell your doctor about all other vaccines you have recently received. Also tell the doctor if you have recently received drugs or treatments that can weaken the immune system, including:  · an oral, nasal, inhaled, or injectable steroid medicine;  · medications to treat psoriasis, rheumatoid arthritis, or other autoimmune disorders; or  · medicines to treat or prevent organ transplant rejection. If you are using any of these medications, you may not be able to receive the vaccine, or may need to wait until the other treatments are finished. This list is not complete. Other drugs may interact with this vaccine, including prescription and over-the-counter medicines, vitamins, and herbal products. Not all possible interactions are listed in this medication guide. Where can I get more information? Your doctor or pharmacist can provide more information about this vaccine. Additional information is available from your local health department or the Centers for Disease Control and Prevention. Remember, keep this and all other medicines out of the reach of children, never share your medicines with others, and use this medication only for the indication prescribed. Every effort has been made to ensure that the information provided by [address] is accurate, up-to-date, and complete, but no guarantee is made to that effect. Drug information contained herein may be time sensitive. [facility] information has been compiled for use by healthcare practitioners and consumers in the [place] and therefore [facility] does not warrant that uses outside of the [place] are appropriate, unless specifically indicated otherwise. [facility]'s drug information does not endorse drugs, diagnose patients or recommend therapy. [facility]'s drug information is an informational resource designed to assist licensed healthcare practitioners in caring for their patients and/or to serve consumers viewing this service as a supplement to, and not a substitute for, the expertise, skill, knowledge and judgment of healthcare practitioners. The absence of a warning for a given drug or drug combination in no way should be construed to indicate that the drug or drug combination is safe, effective or appropriate for any given patient. [facility] does not assume any responsibility for any aspect of healthcare administered with the aid of information [facility] provides. The information contained herein is not intended to cover all possible uses, directions, precautions, warnings, drug interactions, allergic reactions, or adverse effects. If you have questions about the drugs you are taking, check with your doctor, nurse or pharmacist.  Copyright 4998-8262 [address] [place] [address] Dr PÉREZ. Version: 3.01. Revision date: 8/19/2016. Care instructions adapted under license by [facility] ([facility]).  If you have questions about a medical condition or this instruction, always ask your healthcare professional. [address] any warranty or liability for your use of this information. Patient Education        Recombinant Zoster (Shingles) Vaccine: What You Need to Know  Why get vaccinated? Recombinant zoster (shingles) vaccine can prevent shingles. Shingles (also called herpes zoster, or just zoster) is a painful skin rash, usually with blisters. In addition to the rash, shingles can cause fever, headache, chills, or upset stomach. More rarely, shingles can lead to pneumonia, hearing problems, blindness, brain inflammation (encephalitis), or death. The most common complication of shingles is long-term nerve pain called postherpetic neuralgia (PHN). PHN occurs in the areas where the shingles rash was, even after the rash clears up. It can last for months or years after the rash goes away. The pain from PHN can be severe and debilitating. About 10 to 18% of people who get shingles will experience PHN. The risk of PHN increases with age. An older adult with shingles is more likely to develop PHN and have longer lasting and more severe pain than a younger person with shingles. Shingles is caused by the varicella zoster virus, the same virus that causes chickenpox. After you have chickenpox, the virus stays in your body and can cause shingles later in life. Shingles cannot be passed from one person to another, but the virus that causes shingles can spread and cause chickenpox in someone who had never had chickenpox or received chickenpox vaccine. Recombinant shingles vaccine  Recombinant shingles vaccine provides strong protection against shingles. By preventing shingles, recombinant shingles vaccine also protects against PHN. Recombinant shingles vaccine is the preferred vaccine for the prevention of shingles. However, a different vaccine, live shingles vaccine, may be used in some circumstances.   The recombinant shingles medicine, there is a very remote chance of a vaccine causing a severe allergic reaction, other serious injury, or death. What if there is a serious problem? An allergic reaction could occur after the vaccinated person leaves the clinic. If you see signs of a severe allergic reaction (hives, swelling of the face and throat, difficulty breathing, a fast heartbeat, dizziness, or weakness), call 9-1-1 and get the person to the nearest hospital.  For other signs that concern you, call your health care provider. Adverse reactions should be reported to the Vaccine Adverse Event Reporting System (VAERS). Your health care provider will usually file this report, or you can do it yourself. Visit the VAERS website at www.vaers. [facility].gov or call 5-811.307.5197. VAERS is only for reporting reactions, and VAERS staff do not give medical advice. How can I learn more? · Ask your health care provider. · Call your local or state health department. · Contact the Centers for Disease Control and Prevention (CDC):  ? Call 8-803.217.5019 (1-800-CDC-INFO) or  ? Visit CDC's website at www.cdc.gov/vaccines  Vaccine Information Statement  Recombinant Zoster Vaccine  10/30/2019  [facility] and [facility] for Disease Control and Prevention  Many Vaccine Information Statements are available in Armenian and other languages. See www.immunize.org/vis. Hojas de Información Sobre Vacunas están disponibles en Español y en muchos otros idiomas. Visite Yesika.si   Care instructions adapted under license by [facility] ([facility]). If you have questions about a medical condition or this instruction, always ask your healthcare professional. [place] [address] any warranty or liability for your use of this information.

## 2020-08-22 ENCOUNTER — HOSPITAL ENCOUNTER (OUTPATIENT)
Age: 65
Discharge: HOME | End: 2020-08-22
Payer: COMMERCIAL

## 2020-08-22 LAB
CHOLESTEROL/HDL RATIO: 4.2
CHOLESTEROL: 109 MG/DL
HDLC SERPL-MCNC: 26 MG/DL
LDL CHOLESTEROL: 36 MG/DL (ref 0–130)
TRIGL SERPL-MCNC: 235 MG/DL
VLDLC SERPL CALC-MCNC: ABNORMAL MG/DL (ref 1–30)

## 2020-08-22 PROCEDURE — 80061 LIPID PANEL: CPT

## 2020-08-22 PROCEDURE — 36415 COLL VENOUS BLD VENIPUNCTURE: CPT

## 2020-09-18 RX ORDER — LOVASTATIN 20 MG/1
TABLET ORAL
Qty: 90 TABLET | Refills: 3 | Status: SHIPPED | OUTPATIENT
Start: 2020-09-18 | End: 2021-12-02 | Stop reason: SDUPTHER

## 2020-09-18 RX ORDER — LOSARTAN POTASSIUM 50 MG/1
TABLET ORAL
Qty: 90 TABLET | Refills: 3 | Status: SHIPPED | OUTPATIENT
Start: 2020-09-18 | End: 2021-12-17

## 2020-09-18 RX ORDER — MONTELUKAST SODIUM 10 MG/1
TABLET ORAL
Qty: 90 TABLET | Refills: 3 | Status: SHIPPED | OUTPATIENT
Start: 2020-09-18 | End: 2021-03-01

## 2020-09-18 RX ORDER — ICOSAPENT ETHYL 1000 MG/1
CAPSULE ORAL
Qty: 180 CAPSULE | Refills: 3 | Status: SHIPPED | OUTPATIENT
Start: 2020-09-18 | End: 2021-12-17

## 2020-11-05 ENCOUNTER — HOSPITAL ENCOUNTER (OUTPATIENT)
Age: 65
Discharge: HOME | End: 2020-11-05
Payer: COMMERCIAL

## 2020-11-05 PROCEDURE — 36415 COLL VENOUS BLD VENIPUNCTURE: CPT

## 2020-11-05 PROCEDURE — 86316 IMMUNOASSAY TUMOR OTHER: CPT

## 2020-11-06 ENCOUNTER — HOSPITAL ENCOUNTER (OUTPATIENT)
Age: 65
Setting detail: SPECIMEN
Discharge: HOME | End: 2020-11-06
Payer: COMMERCIAL

## 2020-11-06 LAB
-: NORMAL
REASON FOR REJECTION: NORMAL
ZZ NTE CLEAN UP: ORDERED TEST: NORMAL
ZZ NTE WITH NAME CLEAN UP: SPECIMEN SOURCE: NORMAL

## 2020-11-06 PROCEDURE — 87449 NOS EACH ORGANISM AG IA: CPT

## 2020-11-06 PROCEDURE — 87506 IADNA-DNA/RNA PROBE TQ 6-11: CPT

## 2020-11-06 PROCEDURE — 87329 GIARDIA AG IA: CPT

## 2020-11-06 PROCEDURE — 87328 CRYPTOSPORIDIUM AG IA: CPT

## 2020-11-06 PROCEDURE — 83520 IMMUNOASSAY QUANT NOS NONAB: CPT

## 2020-11-06 PROCEDURE — 87324 CLOSTRIDIUM AG IA: CPT

## 2020-11-06 PROCEDURE — 83993 ASSAY FOR CALPROTECTIN FECAL: CPT

## 2020-11-08 LAB
CAMPYLOBACTER PCR: NORMAL
CHROMOGRANIN A: 101 NG/ML (ref 0–103)
E COLI ENTEROTOXIGENIC PCR: NORMAL
PLESIOMONAS SHIGELLOIDES PCR: NORMAL
SALMONELLA PCR: NORMAL
SHIGATOXIN GENE PCR: NORMAL
SHIGELLA SP PCR: NORMAL
SPECIMEN DESCRIPTION: NORMAL
VIBRIO PCR: NORMAL
YERSINIA ENTEROCOLITICA PCR: NORMAL

## 2020-11-09 LAB
CALPROTECTIN, FECAL: 30 UG/G
DIRECT EXAM: NORMAL
DIRECT EXAM: NORMAL
Lab: NORMAL
SPECIMEN DESCRIPTION: NORMAL

## 2020-11-10 LAB — FECAL PANCREATIC ELASTASE-1: 727 UG/G

## 2020-11-25 ENCOUNTER — HOSPITAL ENCOUNTER (OUTPATIENT)
Age: 65
Setting detail: SPECIMEN
Discharge: HOME | End: 2020-11-25
Payer: COMMERCIAL

## 2020-11-25 PROCEDURE — 87493 C DIFF AMPLIFIED PROBE: CPT

## 2020-11-26 LAB
C DIFFICILE TOXINS, PCR: NORMAL
SPECIMEN DESCRIPTION: NORMAL

## 2021-02-03 ENCOUNTER — HOSPITAL ENCOUNTER (OUTPATIENT)
Dept: GENERAL RADIOLOGY | Age: 66
Discharge: HOME | End: 2021-02-05
Payer: COMMERCIAL

## 2021-02-03 ENCOUNTER — HOSPITAL ENCOUNTER (OUTPATIENT)
Age: 66
Discharge: HOME | End: 2021-02-05
Payer: COMMERCIAL

## 2021-02-03 DIAGNOSIS — N20.0 KIDNEY STONES: ICD-10-CM

## 2021-02-03 PROCEDURE — 74018 RADEX ABDOMEN 1 VIEW: CPT

## 2021-02-08 ENCOUNTER — OFFICE VISIT (OUTPATIENT)
Dept: UROLOGY | Age: 66
End: 2021-02-08
Payer: COMMERCIAL

## 2021-02-08 VITALS
HEART RATE: 87 BPM | RESPIRATION RATE: 18 BRPM | TEMPERATURE: 97.8 F | HEIGHT: 72 IN | SYSTOLIC BLOOD PRESSURE: 131 MMHG | BODY MASS INDEX: 30.48 KG/M2 | WEIGHT: 225 LBS | DIASTOLIC BLOOD PRESSURE: 88 MMHG

## 2021-02-08 DIAGNOSIS — N40.1 BENIGN LOCALIZED PROSTATIC HYPERPLASIA WITH LOWER URINARY TRACT SYMPTOMS (LUTS): ICD-10-CM

## 2021-02-08 DIAGNOSIS — R39.15 URGENCY OF URINATION: ICD-10-CM

## 2021-02-08 DIAGNOSIS — N20.0 KIDNEY STONES: Primary | ICD-10-CM

## 2021-02-08 DIAGNOSIS — R35.0 URINARY FREQUENCY: ICD-10-CM

## 2021-02-08 PROCEDURE — 99214 OFFICE O/P EST MOD 30 MIN: CPT | Performed by: UROLOGY

## 2021-02-08 ASSESSMENT — ENCOUNTER SYMPTOMS
VOMITING: 0
NAUSEA: 0
DIARRHEA: 1
BACK PAIN: 0
COUGH: 0
EYE PAIN: 0
ABDOMINAL PAIN: 0
SHORTNESS OF BREATH: 0
WHEEZING: 0
CONSTIPATION: 0

## 2021-02-08 NOTE — PROGRESS NOTES
[address] [address]  Dept: [address] [facility] Urology Office Note - Established    Patient:  Pedro Borrero  YOB: 1955  Date: 2/8/2021    The patient is a 72 y.o. male who presents todayfor evaluation of the following problems:   Chief Complaint   Patient presents with    Follow-up     6months kub       HPI  This is a very pleasant 70-year-old gentleman with a history of stones. He did have ureteroscopy last year on the right side. His recent KUB x-ray shows no significant stones on the right side but 3 prominent stones in the left kidney. The largest one is between 9 and 10 mm in size. He is not having any symptoms from stones right now. He does have BPH and takes Flomax. He does have significant frequency of urination, particularly in the morning. Generally, he is happy with how he is urinating. Summary of old records: N/A    Additional History: N/A    Procedures Today: N/A    Urinalysis today:  No results found for this visit on 02/08/21. Last several PSA's:  Lab Results   Component Value Date    PSA 0.18 04/05/2018    PSA 0.17 12/28/2017    PSA 0.20 08/07/2015     Last total testosterone:  No results found for: TESTOSTERONE    AUA Symptom Score (2/8/2021):   INCOMPLETE EMPTYING: How often have you had the sensation of not emptying your bladder?: Not at all  FREQUENCY: How often do you have to urinate less than every two hours?: Not at all  INTERMITTENCY: How often have you found you stopped and started again several times when you urinated?: Not at all  URGENCY: How often have you found it difficult to postpone urination?: Not at all  WEAK STREAM: How often have you had a weak urinary stream?: Not at all  STRAINING: How often have you had to strain to start  urination?: Not at all  NOCTURIA: How many times did you typically get up at night to uriniate?: 1 Time  TOTAL I-PSS SCORE[de-identified] 1 Last BUN and creatinine:  Lab Results   Component Value Date    BUN 22 06/21/2020     Lab Results   Component Value Date    CREATININE 0.94 06/21/2020       Additional Lab/Culture results: none    Imaging Reviewed during this Office Visit: 3 stones left kidney up to 1 cm in size  (results were independently reviewed by physician and radiology report verified)    PAST MEDICAL, FAMILY AND SOCIAL HISTORY UPDATE:  Past Medical History:   Diagnosis Date    Asthma     Benign bladder tumor     Cancer ([address]     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, lower lobe    H/O arthroscopy of left knee     H/O seasonal allergies     Hearing aid worn     MICHAEL    Hematuria     Hx of lithotripsy     Hyperlipidemia     Hypertension     Kidney stone     Left ureteral stone     Neoplasm of unspecified nature of bone, soft tissue, 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 Joaquin Noguera Right 6/17/2020    HOLMIUM LASER, CYSTOSCOPY, URETEROSCOPY, STENT PLACEMENT performed by Mary Ellen Sharma MD at [address] [facility] Left 9-21-15    2 stents in Lt.     CYSTOSCOPY  09/30/2015    CYSTOSCOPY Right 06/17/2020    HOLMIUM LASER, CYSTOSCOPY, URETEROSCOPY, STENT PLACEMENT     HERNIA REPAIR Left     inguinal    KNEE ARTHROSCOPY      LITHOTRIPSY Left     x2    LITHOTRIPSY  09/30/15    laser    LITHOTRIPSY Left 01/10/2018    LUNG SURGERY Right 5/23/2011    right lower lobe \"lung carcinoid\" removal    AR FRAGMENT KIDNEY STONE/ ESWL Left 1/10/2018 ESWL EXTRACORPEAL SHOCK WAVE LITHOTRIPSY, POSSIBLE  STENT PLACEMENT ([employer] CONF# [id number]) performed by Davy Armenta MD at [address] [place] / [address] Left 6/9/2014    Excision lesion of nose. Dr. Efe Obrien.     SHOULDER SURGERY Right     closed manipulation    SHOULDER SURGERY Left     open manipulation    SKIN BIOPSY  11/12/2007    meir acilla and neck--squamous papillomas--lft buttock--subcutaneous abscess, back--compound nevus, lft neck--lentigo simplex, lft chest --junctional nevus, rt forearm--blue nevus    SKIN BIOPSY  12/9/2009    lft thigh--follicular cyst    SKIN BIOPSY  2/10/2010    rt thigh--ruptured epidermal inclusion cyst    TONSILLECTOMY      URETER STENT PLACEMENT Left 09/30/15    tiems 2 stents     Family History   Problem Relation Age of Onset    Heart Disease Mother     Diabetes Mother     Heart Disease Father     Diabetes Sister     Diabetes Maternal Grandmother      Outpatient Medications Marked as Taking for the 2/8/21 encounter (Office Visit) with Davy Armenta MD   Medication Sig Dispense Refill    losartan (COZAAR) 50 MG tablet TAKE 1 TABLET DAILY 90 tablet 3    montelukast (SINGULAIR) 10 MG tablet TAKE 1 TABLET NIGHTLY 90 tablet 3    VASCEPA 1 g CAPS capsule TAKE 2 CAPSULES DAILY 180 capsule 3    lovastatin (MEVACOR) 20 MG tablet TAKE 1 TABLET NIGHTLY 90 tablet 3    fenofibrate (TRICOR) 145 MG tablet Take 1 tablet by mouth daily 90 tablet 3    B-D UF III MINI PEN NEEDLES 31G X 5 MM MISC INJECT 1 PEN NEEDLE INTO   THE SKIN DAILY 100 each 3    tamsulosin (FLOMAX) 0.4 MG capsule Take 1 capsule by mouth daily 14 capsule 0    ibuprofen (ADVIL;MOTRIN) 600 MG tablet Take 1 tablet by mouth every 6 hours as needed for Pain 30 tablet 0    FARXIGA 5 MG tablet Take 5 mg by mouth every morning       glimepiride (AMARYL) 4 MG tablet Take 4 mg by mouth 2 times daily  BASAGLAR KWIKPEN 100 UNIT/ML injection pen Inject 60 Units into the skin daily       aspirin 81 MG chewable tablet Take 1 tablet by mouth daily Hold for 5 days after surgery, until 1/16/18 (Patient taking differently: Take 81 mg by mouth daily ) 30 tablet 0    VENTOLIN  (90 BASE) MCG/ACT inhaler inhale 2 puff every 4 hours prn  0    JANUMET XR  MG TB24 tablet Take 2 tablets by mouth daily         Seasonal  Social History     Tobacco Use   Smoking Status Never Smoker   Smokeless Tobacco Never Used     (Ifpatient a smoker, smoking cessation counseling offered)    Social History     Substance and Sexual Activity   Alcohol Use Yes    Comment: once a week       REVIEW OF SYSTEMS:  Review of Systems    Physical Exam:      Vitals:    02/08/21 1021   BP: 131/88   Pulse: 87   Resp: 18   Temp: 97.8 °F (36.6 °C)     Body mass index is 30.52 kg/m². Patient is a 72 y.o. male in no acute distress and alert and oriented to person, place and time. Physical Exam  Constitutional: Patient in no acute distress. Neuro: Alert and oriented to person, place and time. Psych: Mood normal, affect normal  Skin: No rash noted  HEENT: Head: Normocephalic andatraumatic  Conjunctivae and EOM are normal. Pupils are equal, round  Nose:Normal  Right External Ear: Normal; Left External Ear: Normal  Mouth: Mucosa Moist  Neck: Supple  Lungs: Respiratory effort is normal  Cardiovascular: Warm & Pink  Abdomen: Soft, non-tender, non-distended with no CVA,  No flank tenderness,  Or hepatosplenomegaly   Lymphatics: No palpablelymphadenopathy. Bladder non-tender and not distended. Musculoskeletal: Normal gait and station      Assessment and Plan      1. Kidney stones    2. Benign localized prostatic hyperplasia with lower urinary tract symptoms (LUTS)    3. Urgency of urination    4. Urinary frequency           Plan:   Left eswl  Cont flomax      Return for surgery.     Prescriptions Ordered: No orders of the defined types were placed in this encounter. Orders Placed:  No orders of the defined types were placed in this encounter. Jennifer Ag MD    Agree with the ROS entered by the MA.

## 2021-02-18 ENCOUNTER — OFFICE VISIT (OUTPATIENT)
Dept: PRIMARY CARE CLINIC | Age: 66
End: 2021-02-18
Payer: COMMERCIAL

## 2021-02-18 VITALS
HEIGHT: 72 IN | BODY MASS INDEX: 30.77 KG/M2 | TEMPERATURE: 98 F | WEIGHT: 227.2 LBS | DIASTOLIC BLOOD PRESSURE: 64 MMHG | OXYGEN SATURATION: 97 % | SYSTOLIC BLOOD PRESSURE: 126 MMHG | HEART RATE: 76 BPM

## 2021-02-18 DIAGNOSIS — I10 ESSENTIAL HYPERTENSION: Primary | ICD-10-CM

## 2021-02-18 PROBLEM — E04.9 NON-TOXIC GOITER: Status: ACTIVE | Noted: 2021-02-18

## 2021-02-18 PROCEDURE — 99214 OFFICE O/P EST MOD 30 MIN: CPT | Performed by: FAMILY MEDICINE

## 2021-02-18 SDOH — ECONOMIC STABILITY: INCOME INSECURITY: HOW HARD IS IT FOR YOU TO PAY FOR THE VERY BASICS LIKE FOOD, HOUSING, MEDICAL CARE, AND HEATING?: NOT VERY HARD

## 2021-02-18 SDOH — ECONOMIC STABILITY: FOOD INSECURITY: WITHIN THE PAST 12 MONTHS, THE FOOD YOU BOUGHT JUST DIDN'T LAST AND YOU DIDN'T HAVE MONEY TO GET MORE.: NEVER TRUE

## 2021-02-18 SDOH — ECONOMIC STABILITY: TRANSPORTATION INSECURITY
IN THE PAST 12 MONTHS, HAS THE LACK OF TRANSPORTATION KEPT YOU FROM MEDICAL APPOINTMENTS OR FROM GETTING MEDICATIONS?: NO

## 2021-02-18 ASSESSMENT — PATIENT HEALTH QUESTIONNAIRE - PHQ9
SUM OF ALL RESPONSES TO PHQ QUESTIONS 1-9: 0
1. LITTLE INTEREST OR PLEASURE IN DOING THINGS: 0
SUM OF ALL RESPONSES TO PHQ QUESTIONS 1-9: 0

## 2021-02-18 ASSESSMENT — ENCOUNTER SYMPTOMS: RESPIRATORY NEGATIVE: 1

## 2021-02-18 NOTE — PROGRESS NOTES
[facility] PRIMARY CARE  [address] [address] [place] [address]  Dept: [address]  is a 72 y.o. male Established patient, who presents today for his medical conditions/complaintsas noted below. Chief Complaint   Patient presents with    Hypertension     6mth f/u       HPI:     HPI    Reviewed prior notes None  Reviewed previous colonoscopy 12/20 and Labs     Has kidney stones, needs another lithotripsy for stones.      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, lower lobe    H/O arthroscopy of left knee     H/O seasonal allergies     Hearing aid worn     MICHAEL    Hematuria     Hx of lithotripsy     Hyperlipidemia     Hypertension     Kidney stone     Left ureteral stone     Neoplasm of unspecified nature of bone, soft tissue, 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 Ana Maria Hollis Right 6/17/2020 HOLMIUM LASER, CYSTOSCOPY, URETEROSCOPY, STENT PLACEMENT performed by Eloy Whitlye MD at [address] Left 9-21-15    2 stents in Lt.  CYSTOSCOPY  09/30/2015    CYSTOSCOPY Right 06/17/2020    HOLMIUM LASER, CYSTOSCOPY, URETEROSCOPY, STENT PLACEMENT     HERNIA REPAIR Left     inguinal    KNEE ARTHROSCOPY      LITHOTRIPSY Left     x2    LITHOTRIPSY  09/30/15    laser    LITHOTRIPSY Left 01/10/2018    LUNG SURGERY Right 5/23/2011    right lower lobe \"lung carcinoid\" removal    MT FRAGMENT KIDNEY STONE/ ESWL Left 1/10/2018    ESWL EXTRACORPEAL SHOCK WAVE LITHOTRIPSY, POSSIBLE  STENT PLACEMENT ([facility] CONF# [id number]) performed by Eloy Whitley MD at [address] [place] / [address] Left 6/9/2014    Excision lesion of nose. Dr. Junior Perez.     SHOULDER SURGERY Right     closed manipulation    SHOULDER SURGERY Left     open manipulation    SKIN BIOPSY  11/12/2007    meir acilla and neck--squamous papillomas--lft buttock--subcutaneous abscess, back--compound nevus, lft neck--lentigo simplex, lft chest --junctional nevus, rt forearm--blue nevus    SKIN BIOPSY  12/9/2009    lft thigh--follicular cyst    SKIN BIOPSY  2/10/2010    rt thigh--ruptured epidermal inclusion cyst    TONSILLECTOMY      URETER STENT PLACEMENT Left 09/30/15    tiems 2 stents       Family History   Problem Relation Age of Onset    Heart Disease Mother     Diabetes Mother     Heart Disease Father     Diabetes Sister     Diabetes Maternal Grandmother        Social History     Tobacco Use    Smoking status: Never Smoker    Smokeless tobacco: Never Used   Substance Use Topics    Alcohol use: Yes     Comment: once a week      Current Outpatient Medications   Medication Sig Dispense Refill    losartan (COZAAR) 50 MG tablet TAKE 1 TABLET DAILY 90 tablet 3    montelukast (SINGULAIR) 10 MG tablet TAKE 1 TABLET NIGHTLY 90 tablet 3  VASCEPA 1 g CAPS capsule TAKE 2 CAPSULES DAILY 180 capsule 3    lovastatin (MEVACOR) 20 MG tablet TAKE 1 TABLET NIGHTLY 90 tablet 3    fenofibrate (TRICOR) 145 MG tablet Take 1 tablet by mouth daily 90 tablet 3    B-D UF III MINI PEN NEEDLES 31G X 5 MM MISC INJECT 1 PEN NEEDLE INTO   THE SKIN DAILY 100 each 3    tamsulosin (FLOMAX) 0.4 MG capsule Take 1 capsule by mouth daily 14 capsule 0    ibuprofen (ADVIL;MOTRIN) 600 MG tablet Take 1 tablet by mouth every 6 hours as needed for Pain 30 tablet 0    FARXIGA 5 MG tablet Take 5 mg by mouth every morning       glimepiride (AMARYL) 4 MG tablet Take 4 mg by mouth 2 times daily       BASAGLAR KWIKPEN 100 UNIT/ML injection pen Inject 60 Units into the skin daily       aspirin 81 MG chewable tablet Take 1 tablet by mouth daily Hold for 5 days after surgery, until 1/16/18 (Patient taking differently: Take 81 mg by mouth daily ) 30 tablet 0    VENTOLIN  (90 BASE) MCG/ACT inhaler inhale 2 puff every 4 hours prn  0    JANUMET XR  MG TB24 tablet Take 2 tablets by mouth daily      acetaminophen (APAP EXTRA STRENGTH) 500 MG tablet Take 2 tablets by mouth every 6 hours as needed for Pain (Patient not taking: Reported on 8/21/2020) 30 tablet 0     No current facility-administered medications for this visit.       Allergies   Allergen Reactions    Seasonal        Health Maintenance   Topic Date Due    Hepatitis C screen  1955    Hepatitis A vaccine (1 of 2 - Risk 2-dose series) 02/19/1956    HIV screen  02/19/1970    COVID-19 Vaccine (1 of 2) 02/19/1971    Shingles Vaccine (1 of 2) 02/19/2005    Flu vaccine (1) 09/01/2020    Diabetic microalbuminuria test  11/15/2020    Diabetic foot exam  02/20/2021    A1C test (Diabetic or Prediabetic)  02/20/2021    Diabetic retinal exam  02/20/2021    Potassium monitoring  06/21/2021    Creatinine monitoring  06/21/2021    Lipid screen  08/22/2021  Pneumococcal 65+ years Vaccine (2 of 2 - PPSV23) 12/01/2024    DTaP/Tdap/Td vaccine (2 - Td) 08/21/2030    Colon cancer screen colonoscopy  12/30/2030    Hib vaccine  Aged Out    Meningococcal (ACWY) vaccine  Aged Out       Subjective:      Review of Systems   Respiratory: Negative. Cardiovascular: Negative. Neurological: Negative for dizziness and light-headedness. Objective:     /64   Pulse 76   Temp 98 °F (36.7 °C)   Ht 6' (1.829 m)   Wt 227 lb 3.2 oz (103.1 kg)   SpO2 97%   BMI 30.81 kg/m²   Physical Exam  Vitals signs and nursing note reviewed. Constitutional:       General: He is not in acute distress. Appearance: He is well-developed. He is not ill-appearing. HENT:      Head: Normocephalic and atraumatic. Right Ear: External ear normal.      Left Ear: External ear normal.   Eyes:      General: No scleral icterus. Right eye: No discharge. Left eye: No discharge. Conjunctiva/sclera: Conjunctivae normal.      Pupils: Pupils are equal, round, and reactive to light. Neck:      Thyroid: No thyromegaly. Trachea: No tracheal deviation. Cardiovascular:      Rate and Rhythm: Normal rate and regular rhythm. Heart sounds: Normal heart sounds. Pulmonary:      Effort: Pulmonary effort is normal. No respiratory distress. Breath sounds: Normal breath sounds. No wheezing. Musculoskeletal:      Right lower leg: No edema. Left lower leg: No edema. Lymphadenopathy:      Cervical: No cervical adenopathy. Skin:     General: Skin is warm. Findings: No rash. Neurological:      Mental Status: He is alert and oriented to person, place, and time. Psychiatric:         Mood and Affect: Mood normal.         Behavior: Behavior normal.         Thought Content: Thought content normal.         Assessment:       Diagnosis Orders   1. Essential hypertension          Plan:      Return in about 7 months (around 9/18/2021) for hypertension. He should f/u with DM doctor. No orders of the defined types were placed in this encounter. No orders of the defined types were placed in this encounter. Patient given educationalmaterials - see patient instructions. Discussed use, benefit, and side effectsof prescribed medications. All patient questions answered. Pt voiced understanding. Reviewed health maintenance. Instructed to continue current medications, diet andexercise. Patient agreed with treatment plan. Follow up as directed.      Electronicallysigned by Arie Mane MD on 2/18/2021 at 11:01 AM

## 2021-02-24 ENCOUNTER — TELEPHONE (OUTPATIENT)
Dept: UROLOGY | Age: 66
End: 2021-02-24

## 2021-02-24 NOTE — TELEPHONE ENCOUNTER
(Lt) ESWL @ [facility] 3/5/21 1:00pm  PAT @ [facility] 3/1/2021 1:00pm  COVID-19 testing 3/1/21 2:45pm      Spoke with patient regarding all procedure information. **STOP BLOOD THINNERS**. Procedure Info emailed on 2/24/21.

## 2021-02-25 RX ORDER — SODIUM CHLORIDE, SODIUM LACTATE, POTASSIUM CHLORIDE, CALCIUM CHLORIDE 600; 310; 30; 20 MG/100ML; MG/100ML; MG/100ML; MG/100ML
1000 INJECTION, SOLUTION INTRAVENOUS CONTINUOUS
Status: CANCELLED | OUTPATIENT
Start: 2021-02-25

## 2021-03-01 ENCOUNTER — HOSPITAL ENCOUNTER (OUTPATIENT)
Dept: PREADMISSION TESTING | Age: 66
Discharge: HOME | End: 2021-03-05
Payer: COMMERCIAL

## 2021-03-01 ENCOUNTER — HOSPITAL ENCOUNTER (OUTPATIENT)
Dept: LAB | Age: 66
Setting detail: SPECIMEN
Discharge: HOME | End: 2021-03-01
Payer: COMMERCIAL

## 2021-03-01 VITALS
HEIGHT: 72 IN | HEART RATE: 92 BPM | OXYGEN SATURATION: 97 % | SYSTOLIC BLOOD PRESSURE: 118 MMHG | BODY MASS INDEX: 30.88 KG/M2 | TEMPERATURE: 97.5 F | RESPIRATION RATE: 18 BRPM | DIASTOLIC BLOOD PRESSURE: 66 MMHG | WEIGHT: 228 LBS

## 2021-03-01 DIAGNOSIS — Z20.822 COVID-19 RULED OUT BY LABORATORY TESTING: Primary | ICD-10-CM

## 2021-03-01 LAB
ANION GAP SERPL CALCULATED.3IONS-SCNC: 13 MMOL/L (ref 9–17)
BUN BLDV-MCNC: 21 MG/DL (ref 8–23)
CHLORIDE BLD-SCNC: 103 MMOL/L (ref 98–107)
CO2: 23 MMOL/L (ref 20–31)
CREAT SERPL-MCNC: 1 MG/DL (ref 0.7–1.2)
EKG ATRIAL RATE: 84 BPM
EKG P AXIS: 28 DEGREES
EKG P-R INTERVAL: 180 MS
EKG Q-T INTERVAL: 384 MS
EKG QRS DURATION: 94 MS
EKG QTC CALCULATION (BAZETT): 453 MS
EKG R AXIS: 87 DEGREES
EKG T AXIS: 46 DEGREES
EKG VENTRICULAR RATE: 84 BPM
GFR AFRICAN AMERICAN: >60 ML/MIN
GFR NON-AFRICAN AMERICAN: >60 ML/MIN
GFR SERPL CREATININE-BSD FRML MDRD: NORMAL ML/MIN/{1.73_M2}
GFR SERPL CREATININE-BSD FRML MDRD: NORMAL ML/MIN/{1.73_M2}
GLUCOSE BLD-MCNC: 162 MG/DL (ref 70–99)
HCT VFR BLD CALC: 49.7 % (ref 40.7–50.3)
HEMOGLOBIN: 16.5 G/DL (ref 13–17)
POTASSIUM SERPL-SCNC: 4 MMOL/L (ref 3.7–5.3)
SODIUM BLD-SCNC: 139 MMOL/L (ref 135–144)

## 2021-03-01 PROCEDURE — 36415 COLL VENOUS BLD VENIPUNCTURE: CPT

## 2021-03-01 PROCEDURE — 82565 ASSAY OF CREATININE: CPT

## 2021-03-01 PROCEDURE — 93010 ELECTROCARDIOGRAM REPORT: CPT | Performed by: INTERNAL MEDICINE

## 2021-03-01 PROCEDURE — 87086 URINE CULTURE/COLONY COUNT: CPT

## 2021-03-01 PROCEDURE — 85018 HEMOGLOBIN: CPT

## 2021-03-01 PROCEDURE — 84520 ASSAY OF UREA NITROGEN: CPT

## 2021-03-01 PROCEDURE — 93005 ELECTROCARDIOGRAM TRACING: CPT | Performed by: ANESTHESIOLOGY

## 2021-03-01 PROCEDURE — U0003 INFECTIOUS AGENT DETECTION BY NUCLEIC ACID (DNA OR RNA); SEVERE ACUTE RESPIRATORY SYNDROME CORONAVIRUS 2 (SARS-COV-2) (CORONAVIRUS DISEASE [COVID-19]), AMPLIFIED PROBE TECHNIQUE, MAKING USE OF HIGH THROUGHPUT TECHNOLOGIES AS DESCRIBED BY CMS-2020-01-R: HCPCS

## 2021-03-01 PROCEDURE — 80051 ELECTROLYTE PANEL: CPT

## 2021-03-01 PROCEDURE — U0005 INFEC AGEN DETEC AMPLI PROBE: HCPCS

## 2021-03-01 PROCEDURE — 82947 ASSAY GLUCOSE BLOOD QUANT: CPT

## 2021-03-01 PROCEDURE — 85014 HEMATOCRIT: CPT

## 2021-03-01 NOTE — H&P
History and Physical    Pt Name: Fern Maravilla  MRN: [id number]  YOB: 1955  Date of evaluation: 3/1/2021  Primary Care Physician: Anson Agustin MD    SUBJECTIVE:   History of Chief Complaint:    Fern Maravilla is a 77 y.o. male who presents for PAT appointment. Patient complains of left kidney stone (s). He reports history of prior renal stones requiring lithotripsy. He reports his last intervention for renal stones was last year. He reports imaging on f/u revealed stones on the left and that they are \"getting bigger\". He is not having any symptoms from stones right now. Patient has been scheduled for ESWL EXTRACORPOREAL SHOCK WAVE LITHOTRIPSY - LEFT. Allergies  is allergic to seasonal.  Medications  Prior to Admission medications    Medication Sig Start Date End Date Taking?  Authorizing Provider   losartan (COZAAR) 50 MG tablet TAKE 1 TABLET DAILY 9/18/20  Yes Anson Agustin MD   VASCEPA 1 g CAPS capsule TAKE 2 CAPSULES DAILY 9/18/20  Yes Anson Agustin MD   lovastatin (MEVACOR) 20 MG tablet TAKE 1 TABLET NIGHTLY 9/18/20  Yes Anson Agustin MD   fenofibrate (TRICOR) 145 MG tablet Take 1 tablet by mouth daily 8/21/20  Yes Anson Agustin MD   B-D UF III MINI PEN NEEDLES 31G X 5 MM MISC INJECT 1 PEN NEEDLE INTO   THE SKIN DAILY 6/29/20  Yes Anson Agustin MD   FARXIGA 5 MG tablet Take 5 mg by mouth every morning  4/21/19  Yes Historical Provider, MD   glimepiride (AMARYL) 4 MG tablet Take 4 mg by mouth 2 times daily  4/27/19  Yes Historical Provider, MD   [facility] 100 UNIT/ML injection pen Inject 60 Units into the skin daily  6/2/19  Yes Historical Provider, MD   VENTOLIN  (90 BASE) MCG/ACT inhaler inhale 2 puff every 4 hours prn 6/24/16  Yes Historical Provider, MD   JANUMET XR  MG TB24 tablet Take 2 tablets by mouth daily 8/21/15  Yes Historical Provider, MD   ibuprofen (ADVIL;MOTRIN) 600 MG tablet Take 1 tablet by mouth every 6 hours as needed for Pain 6/14/20   Ocie Oakwood Juana, DO   aspirin 81 MG chewable tablet Take 1 tablet by mouth daily Hold for 5 days after surgery, until 18  Patient taking differently: Take 81 mg by mouth daily  1/10/18   Nicole Guillen MD     Past Medical History    has a past medical history of Asthma, Benign bladder tumor, Bilateral hearing loss, BPH (benign prostatic hyperplasia), Cancer ([address] Carcinoid tumor of lung, Cataract, GERD (gastroesophageal reflux disease), H/O arthroscopy of left knee, H/O seasonal allergies, Hearing aid worn, Hematuria, Hx of lithotripsy, Hyperlipidemia, Hypertension, Kidney stone, Left ureteral stone, Neoplasm of unspecified nature of bone, soft tissue, and skin, Osteoarthritis, Snores, SOB (shortness of breath), Type II or unspecified type diabetes mellitus without mention of complication, not stated as uncontrolled, Ureteral stent retained, and Wears glasses. Past Surgical History   has a past surgical history that includes bladder tumor excision (); Lung surgery (Right, 2011); pre-malignant / benign skin lesion excision (Left, 2014); shoulder surgery (Right); shoulder surgery (Left); Cystoscopy; bronchoscopy; Lithotripsy (Left); Knee arthroscopy; Cystoscopy (Left, 9-21-15); Cystoscopy (2015); Ureter stent placement (Left, 09/30/15); Lithotripsy (09/30/15); Lithotripsy (Left, 01/10/2018); pr fragment kidney stone/ eswl (Left, 1/10/2018); Cystoscopy (Right, 2020); cysto/uretero/pyeloscopy, calculus tx (Right, 2020); Colonoscopy (2015); Colonoscopy; hernia repair (Left); skin biopsy (2007); skin biopsy (2009); skin biopsy (2/10/2010); Tonsillectomy; and eye surgery. Social History   reports that he has never smoked. He has never used smokeless tobacco.    reports current alcohol use. reports no history of drug use.    Marital Status    Children   Occupation   Family History  Family Status   Relation Name Status    Mother      Father     Sister  Alive    MG  (Not Specified)   Tonie Mcmillan     family history includes Diabetes in his brother, maternal grandmother, mother, and sister; Heart Disease in his father and mother. Review of Systems:  CONSTITUTIONAL:   negative for fevers, chills, fatigue and malaise    EYES:   negative for double vision, blurred vision and photophobia +glasses   HEENT:   negative for tinnitus, epistaxis and sore throat  +hearing loss   RESPIRATORY:   negative for cough, shortness of breath, wheezing     CARDIOVASCULAR:   negative for chest pain, palpitations, syncope, edema     GASTROINTESTINAL:   negative for nausea, vomiting     GENITOURINARY:   negative for incontinence     MUSCULOSKELETAL:   negative for neck or back pain     NEUROLOGICAL:   Negative for weakness and tingling  negative for headaches and dizziness     PSYCHIATRIC:   negative for anxiety       OBJECTIVE:   VITALS:  height is 6' (1.829 m) and weight is 228 lb (103.4 kg). His tympanic temperature is 97.5 °F (36.4 °C). His blood pressure is 118/66 and his pulse is 92. His respiration is 18 and oxygen saturation is 97%. CONSTITUTIONAL:alert & oriented x 3, no acute distress. Friendly. Very pleasant and talkative. SKIN:  Warm and dry, no rashes on exposed areas of skin   HEAD:  Normocephalic, atraumatic   EYES: PERRL. EOMs intact. Wearing glasses. EARS:  Equal bilaterally, no edema or thickening, skin is intact without lumps or lesions. No discharge. Hearing loss, uses hearing aids  NOSE:  Nares patent. No rhinorrhea   MOUTH/THROAT:  Mucous membranes moist, tongue is pink, uvula midline, teeth appear to be intact  NECK:supple, no lymphadenopathy  LUNGS: Respirations even and non-labored. Clear to auscultation bilaterally, no wheezes, rales, or rhonchi.     CARDIOVASCULAR: Regular rate and rhythm, no murmurs/rubs/gallops   ABDOMEN: soft, non-tender, non-distended, bowel sounds active x 4   EXTREMITIES: No edema bilateral lower extremities. No varicosities bilateral lower extremities. NEUROLOGIC: CN II-XII are grossly intact. Gait not assessed. Testing:   EKG: 3/1/21  Labs pending: drawn 3/1/2021     IMPRESSIONS:   Kidney stones      Diagnosis Date    Asthma     Dr. Andi Mirza. 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 Mg Mckoy PCP    Hypertension     managed by Dr. Mg Mckoy PCP.  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 Chuy Mane Endocrinologist    Ureteral stent retained     in place x 2    Wears glasses      PLANS:   Left ESWL    YORDAN  LAYTON APRN- CNP  Electronically signed 3/1/2021 at 2:19 PM

## 2021-03-01 NOTE — PROGRESS NOTES
Anesthesia Focused Assessment    STOP-BANG Sleep Apnea Questionnaire    Prior + Covid-19 test? NO      SNORE loudly (heard through closed doors)? Yes  TIRED, fatigued, sleepy during daytime? No  OBSERVED stopping breathing during sleep? No  High blood PRESSURE or being treated? Yes    BMI over 35? No  AGE over 48? Yes  NECK circumference over 16\"? No  GENDER (male)? Yes             Total 4  High risk 5-8  Intermediate risk 3-4  Low risk 0-2    ----------------------------------------------------------------------------------------------------------------------  SUKHI:no, but snores loudly per wife  If yes, machine?:     Type 1 DM:   no  T2DM:  yes    Coronary Artery Disease:  no  Hypertension:  yes  Defib / AICD / Pacemaker: no    Renal Failure:  no  If yes, on dialysis?:     Active smoker:  no  Drinks Alcohol:  no  Illicit drugs: no    Dentition: benign     Past Medical History:   Diagnosis Date    Asthma     Dr. Luis Bowling. 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 Steffanie Stanley PCP    Hypertension     managed by Dr. Steffanie Stanley PCP.  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 Jose Liu Endocrinologist    Ureteral stent retained     in place x 2    Wears glasses          Patient was evaluated in PAT & anesthesia guidelines were applied. NPO guidelines, medication instructions and scheduled arrival time were reviewed with patient.      Hx of anesthesia complications:  no  Family hx of anesthesia complications:  no                                                                                                                     Anesthesia contacted:   no    Medical or cardiac clearance ordered: nessa LAYTON CNP  3/1/21  2:20 PM

## 2021-03-02 LAB
CULTURE: NORMAL
Lab: NORMAL
SARS-COV-2: NORMAL
SARS-COV-2: NOT DETECTED
SOURCE: NORMAL
SPECIMEN DESCRIPTION: NORMAL

## 2021-03-03 ENCOUNTER — TELEPHONE (OUTPATIENT)
Dept: PRIMARY CARE CLINIC | Age: 66
End: 2021-03-03

## 2021-03-03 NOTE — TELEPHONE ENCOUNTER
Patients VM not set up yet [General Appearance - In No Acute Distress] : no acute distress [Heart Sounds] : normal S1 and S2 [Murmurs] : no murmurs present [Edema] : no peripheral edema present [Abdomen Tenderness] : non-tender [FreeTextEntry1] : squeaks

## 2021-03-04 ENCOUNTER — ANESTHESIA EVENT (OUTPATIENT)
Dept: OPERATING ROOM | Age: 66
End: 2021-03-04
Payer: COMMERCIAL

## 2021-03-05 ENCOUNTER — HOSPITAL ENCOUNTER (OUTPATIENT)
Age: 66
Setting detail: OUTPATIENT SURGERY
Discharge: HOME | End: 2021-03-05
Attending: UROLOGY | Admitting: UROLOGY
Payer: COMMERCIAL

## 2021-03-05 ENCOUNTER — ANESTHESIA (OUTPATIENT)
Dept: OPERATING ROOM | Age: 66
End: 2021-03-05
Payer: COMMERCIAL

## 2021-03-05 ENCOUNTER — APPOINTMENT (OUTPATIENT)
Dept: GENERAL RADIOLOGY | Age: 66
End: 2021-03-05
Attending: UROLOGY
Payer: COMMERCIAL

## 2021-03-05 ENCOUNTER — HOSPITAL ENCOUNTER (OUTPATIENT)
Age: 66
Discharge: HOME | End: 2021-03-07
Attending: UROLOGY
Payer: COMMERCIAL

## 2021-03-05 ENCOUNTER — TELEPHONE (OUTPATIENT)
Dept: UROLOGY | Age: 66
End: 2021-03-05

## 2021-03-05 VITALS
DIASTOLIC BLOOD PRESSURE: 72 MMHG | WEIGHT: 228 LBS | SYSTOLIC BLOOD PRESSURE: 134 MMHG | HEART RATE: 80 BPM | OXYGEN SATURATION: 94 % | TEMPERATURE: 96.8 F | HEIGHT: 72 IN | RESPIRATION RATE: 16 BRPM | BODY MASS INDEX: 30.88 KG/M2

## 2021-03-05 VITALS
RESPIRATION RATE: 18 BRPM | TEMPERATURE: 96.7 F | OXYGEN SATURATION: 100 % | DIASTOLIC BLOOD PRESSURE: 61 MMHG | SYSTOLIC BLOOD PRESSURE: 102 MMHG

## 2021-03-05 DIAGNOSIS — N20.0 RENAL CALCULUS, LEFT: Primary | ICD-10-CM

## 2021-03-05 DIAGNOSIS — N20.0 KIDNEY STONES: Primary | ICD-10-CM

## 2021-03-05 LAB
GLUCOSE BLD-MCNC: 115 MG/DL (ref 75–110)
GLUCOSE BLD-MCNC: 134 MG/DL (ref 75–110)

## 2021-03-05 PROCEDURE — 3600000002 HC SURGERY LEVEL 2 BASE: Performed by: UROLOGY

## 2021-03-05 PROCEDURE — 7100000041 HC SPAR PHASE II RECOVERY - ADDTL 15 MIN: Performed by: UROLOGY

## 2021-03-05 PROCEDURE — 3600000012 HC SURGERY LEVEL 2 ADDTL 15MIN: Performed by: UROLOGY

## 2021-03-05 PROCEDURE — 7100000001 HC PACU RECOVERY - ADDTL 15 MIN: Performed by: UROLOGY

## 2021-03-05 PROCEDURE — 3700000001 HC ADD 15 MINUTES (ANESTHESIA): Performed by: UROLOGY

## 2021-03-05 PROCEDURE — 74018 RADEX ABDOMEN 1 VIEW: CPT

## 2021-03-05 PROCEDURE — 7100000000 HC PACU RECOVERY - FIRST 15 MIN: Performed by: UROLOGY

## 2021-03-05 PROCEDURE — 7100000040 HC SPAR PHASE II RECOVERY - FIRST 15 MIN: Performed by: UROLOGY

## 2021-03-05 PROCEDURE — 2580000003 HC RX 258: Performed by: UROLOGY

## 2021-03-05 PROCEDURE — 2580000003 HC RX 258: Performed by: ANESTHESIOLOGY

## 2021-03-05 PROCEDURE — 3700000000 HC ANESTHESIA ATTENDED CARE: Performed by: UROLOGY

## 2021-03-05 PROCEDURE — 2500000003 HC RX 250 WO HCPCS: Performed by: NURSE ANESTHETIST, CERTIFIED REGISTERED

## 2021-03-05 PROCEDURE — 6360000002 HC RX W HCPCS: Performed by: NURSE ANESTHETIST, CERTIFIED REGISTERED

## 2021-03-05 PROCEDURE — 82947 ASSAY GLUCOSE BLOOD QUANT: CPT

## 2021-03-05 RX ORDER — ONDANSETRON 2 MG/ML
INJECTION INTRAMUSCULAR; INTRAVENOUS PRN
Status: DISCONTINUED | OUTPATIENT
Start: 2021-03-05 | End: 2021-03-05 | Stop reason: SDUPTHER

## 2021-03-05 RX ORDER — FENTANYL CITRATE 50 UG/ML
25 INJECTION, SOLUTION INTRAMUSCULAR; INTRAVENOUS EVERY 5 MIN PRN
Status: DISCONTINUED | OUTPATIENT
Start: 2021-03-05 | End: 2021-03-05 | Stop reason: HOSPADM

## 2021-03-05 RX ORDER — FENTANYL CITRATE 50 UG/ML
50 INJECTION, SOLUTION INTRAMUSCULAR; INTRAVENOUS EVERY 5 MIN PRN
Status: DISCONTINUED | OUTPATIENT
Start: 2021-03-05 | End: 2021-03-05 | Stop reason: HOSPADM

## 2021-03-05 RX ORDER — PROPOFOL 10 MG/ML
INJECTION, EMULSION INTRAVENOUS PRN
Status: DISCONTINUED | OUTPATIENT
Start: 2021-03-05 | End: 2021-03-05 | Stop reason: SDUPTHER

## 2021-03-05 RX ORDER — MAGNESIUM HYDROXIDE 1200 MG/15ML
LIQUID ORAL PRN
Status: DISCONTINUED | OUTPATIENT
Start: 2021-03-05 | End: 2021-03-05 | Stop reason: ALTCHOICE

## 2021-03-05 RX ORDER — DEXAMETHASONE SODIUM PHOSPHATE 4 MG/ML
INJECTION, SOLUTION INTRA-ARTICULAR; INTRALESIONAL; INTRAMUSCULAR; INTRAVENOUS; SOFT TISSUE PRN
Status: DISCONTINUED | OUTPATIENT
Start: 2021-03-05 | End: 2021-03-05 | Stop reason: SDUPTHER

## 2021-03-05 RX ORDER — TAMSULOSIN HYDROCHLORIDE 0.4 MG/1
0.4 CAPSULE ORAL DAILY
Qty: 15 CAPSULE | Refills: 0 | Status: SHIPPED | OUTPATIENT
Start: 2021-03-05 | End: 2021-06-21

## 2021-03-05 RX ORDER — CEFAZOLIN SODIUM 1 G/3ML
INJECTION, POWDER, FOR SOLUTION INTRAMUSCULAR; INTRAVENOUS PRN
Status: DISCONTINUED | OUTPATIENT
Start: 2021-03-05 | End: 2021-03-05 | Stop reason: SDUPTHER

## 2021-03-05 RX ORDER — DOCUSATE SODIUM 100 MG/1
100 CAPSULE, LIQUID FILLED ORAL 2 TIMES DAILY
Qty: 10 CAPSULE | Refills: 0 | Status: SHIPPED | OUTPATIENT
Start: 2021-03-05 | End: 2021-03-10

## 2021-03-05 RX ORDER — MEPERIDINE HYDROCHLORIDE 50 MG/ML
12.5 INJECTION INTRAMUSCULAR; INTRAVENOUS; SUBCUTANEOUS EVERY 5 MIN PRN
Status: DISCONTINUED | OUTPATIENT
Start: 2021-03-05 | End: 2021-03-05 | Stop reason: HOSPADM

## 2021-03-05 RX ORDER — FENTANYL CITRATE 50 UG/ML
INJECTION, SOLUTION INTRAMUSCULAR; INTRAVENOUS PRN
Status: DISCONTINUED | OUTPATIENT
Start: 2021-03-05 | End: 2021-03-05 | Stop reason: SDUPTHER

## 2021-03-05 RX ORDER — ONDANSETRON 2 MG/ML
4 INJECTION INTRAMUSCULAR; INTRAVENOUS
Status: DISCONTINUED | OUTPATIENT
Start: 2021-03-05 | End: 2021-03-05 | Stop reason: HOSPADM

## 2021-03-05 RX ORDER — SODIUM CHLORIDE, SODIUM LACTATE, POTASSIUM CHLORIDE, CALCIUM CHLORIDE 600; 310; 30; 20 MG/100ML; MG/100ML; MG/100ML; MG/100ML
1000 INJECTION, SOLUTION INTRAVENOUS CONTINUOUS
Status: DISCONTINUED | OUTPATIENT
Start: 2021-03-05 | End: 2021-03-05 | Stop reason: HOSPADM

## 2021-03-05 RX ORDER — LIDOCAINE HYDROCHLORIDE 10 MG/ML
INJECTION, SOLUTION EPIDURAL; INFILTRATION; INTRACAUDAL; PERINEURAL PRN
Status: DISCONTINUED | OUTPATIENT
Start: 2021-03-05 | End: 2021-03-05 | Stop reason: SDUPTHER

## 2021-03-05 RX ORDER — OXYCODONE HYDROCHLORIDE AND ACETAMINOPHEN 5; 325 MG/1; MG/1
1 TABLET ORAL EVERY 8 HOURS PRN
Qty: 9 TABLET | Refills: 0 | Status: SHIPPED | OUTPATIENT
Start: 2021-03-05 | End: 2021-03-08

## 2021-03-05 RX ADMIN — PROPOFOL 200 MG: 10 INJECTION, EMULSION INTRAVENOUS at 12:41

## 2021-03-05 RX ADMIN — CEFAZOLIN 2000 MG: 1 INJECTION, POWDER, FOR SOLUTION INTRAMUSCULAR; INTRAVENOUS at 12:48

## 2021-03-05 RX ADMIN — FENTANYL CITRATE 100 MCG: 50 INJECTION, SOLUTION INTRAMUSCULAR; INTRAVENOUS at 12:41

## 2021-03-05 RX ADMIN — LIDOCAINE HYDROCHLORIDE 50 MG: 10 INJECTION, SOLUTION EPIDURAL; INFILTRATION; INTRACAUDAL; PERINEURAL at 12:41

## 2021-03-05 RX ADMIN — DEXAMETHASONE SODIUM PHOSPHATE 4 MG: 4 INJECTION, SOLUTION INTRAMUSCULAR; INTRAVENOUS at 12:41

## 2021-03-05 RX ADMIN — SODIUM CHLORIDE, POTASSIUM CHLORIDE, SODIUM LACTATE AND CALCIUM CHLORIDE 1000 ML: 600; 310; 30; 20 INJECTION, SOLUTION INTRAVENOUS at 12:05

## 2021-03-05 RX ADMIN — ONDANSETRON 4 MG: 2 INJECTION INTRAMUSCULAR; INTRAVENOUS at 13:36

## 2021-03-05 ASSESSMENT — PULMONARY FUNCTION TESTS
PIF_VALUE: 13
PIF_VALUE: 13
PIF_VALUE: 12
PIF_VALUE: 13
PIF_VALUE: 12
PIF_VALUE: 13
PIF_VALUE: 2
PIF_VALUE: 4
PIF_VALUE: 13
PIF_VALUE: 6
PIF_VALUE: 13
PIF_VALUE: 1
PIF_VALUE: 13
PIF_VALUE: 12
PIF_VALUE: 13
PIF_VALUE: 13
PIF_VALUE: 3
PIF_VALUE: 13
PIF_VALUE: 13
PIF_VALUE: 0
PIF_VALUE: 12
PIF_VALUE: 13
PIF_VALUE: 7
PIF_VALUE: 13
PIF_VALUE: 12
PIF_VALUE: 13
PIF_VALUE: 13
PIF_VALUE: 4

## 2021-03-05 ASSESSMENT — ENCOUNTER SYMPTOMS
SHORTNESS OF BREATH: 0
STRIDOR: 0

## 2021-03-05 ASSESSMENT — PAIN SCALES - GENERAL: PAINLEVEL_OUTOF10: 0

## 2021-03-05 NOTE — ANESTHESIA PRE PROCEDURE
Department of Anesthesiology  Preprocedure Note       Name:  Ryley Robledo   Age:  77 y.o.  :  1955                                          MRN:  [id number]         Date:  3/5/2021      Surgeon: Amos Randle):  Kayla Chery MD    Procedure: Procedure(s):  ESWL EXTRACORPOREAL SHOCK WAVE LITHOTRIPSY  (NEX-MED CONF# [id number] - DAISY)    Medications prior to admission:   Prior to Admission medications    Medication Sig Start Date End Date Taking?  Authorizing Provider   losartan (COZAAR) 50 MG tablet TAKE 1 TABLET DAILY 20  Yes Lynsey Saravia MD   VASCEPA 1 g CAPS capsule TAKE 2 CAPSULES DAILY 20  Yes Lynsey Saravia MD   lovastatin (MEVACOR) 20 MG tablet TAKE 1 TABLET NIGHTLY 20  Yes Lynsey Saravia MD   fenofibrate (TRICOR) 145 MG tablet Take 1 tablet by mouth daily 20  Yes Lynsey Saravia MD   ibuprofen (ADVIL;MOTRIN) 600 MG tablet Take 1 tablet by mouth every 6 hours as needed for Pain 20  Yes Piotr Goldberg DO   FARXIGA 5 MG tablet Take 5 mg by mouth every morning  19  Yes Historical Provider, MD   glimepiride (AMARYL) 4 MG tablet Take 4 mg by mouth 2 times daily  19  Yes Historical Provider, MD Terri Franks KWIKPEN 100 UNIT/ML injection pen Inject 60 Units into the skin daily  19  Yes Historical Provider, MD   aspirin 81 MG chewable tablet Take 1 tablet by mouth daily Hold for 5 days after surgery, until 18  Patient taking differently: Take 81 mg by mouth daily  1/10/18  Yes Amy Melgar MD   JANUMET XR  MG TB24 tablet Take 2 tablets by mouth daily 8/21/15  Yes Historical Provider, MD LADD UF III MINI PEN NEEDLES 31G X 5 MM MISC INJECT 1 PEN NEEDLE INTO   THE SKIN DAILY 20   Lynsey Saravia MD   VENTOLIN  (90 BASE) MCG/ACT inhaler inhale 2 puff every 4 hours prn 16   Historical Provider, MD       Current medications:    Current Facility-Administered Medications   Medication Dose Route Frequency Provider Last Rate Last Admin  lactated ringers infusion 1,000 mL  1,000 mL Intravenous Continuous Roly Chase MD 50 mL/hr at 03/05/21 1205 1,000 mL at 03/05/21 1205       Allergies: Allergies   Allergen Reactions    Seasonal        Problem List:    Patient Active Problem List   Diagnosis Code    Neoplasm of unspecified nature of bone, soft tissue, 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    Hearing difficulty H91.90    Renal calculus, left N20.0    Disorder of intervertebral disc of lumbar spine M51.9    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 Zo Ivan. 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 César Tom PCP    Hypertension     managed by Dr. César Tom PCP.  Does not have a cardiologist    Kidney stone     multiple times    Left ureteral stone     Neoplasm of unspecified nature of bone, soft tissue, and skin 05/20/2014    lesion of left side of nose    Osteoarthritis     hands    Snores     SOB (shortness of breath)     with exertion  Type II or unspecified type diabetes mellitus without mention of complication, not stated as uncontrolled     managed by Dr. Tito Allred Endocrinologist    Ureteral stent retained     in place x 2    Wears glasses        Past Surgical History:        Procedure Laterality Date    BLADDER TUMOR EXCISION  2009    benign    BRONCHOSCOPY      COLONOSCOPY  09/08/2015    2 times with polypectomy- Dr. Jackie Mesa Right 6/17/2020    HOLMIUM LASER, CYSTOSCOPY, URETEROSCOPY, STENT PLACEMENT performed by Quan Toth MD at [address] [facility] Left 9-21-15    2 stents in [address]  CYSTOSCOPY  09/30/2015    CYSTOSCOPY Right 06/17/2020    HOLMIUM LASER, CYSTOSCOPY, URETEROSCOPY, STENT PLACEMENT     EYE SURGERY      cataract surgery both eyes    HERNIA REPAIR Left     inguinal    KNEE ARTHROSCOPY      LITHOTRIPSY Left     x2    LITHOTRIPSY  09/30/15    laser    LITHOTRIPSY Left 01/10/2018    LUNG SURGERY Right 05/23/2011    right lower lobe \"lung carcinoid\" removal. Dr. Jaimie Bangura Cardiothoracic Surgeon   [facility] ESWL Left 1/10/2018    ESWL EXTRACORPEAL SHOCK WAVE LITHOTRIPSY, POSSIBLE  STENT PLACEMENT ([facility] MED CONF# [id number]) performed by Quan Toth MD at [address] [place] / [address] Left 6/9/2014    Excision lesion of nose. Dr. Juliet Knowles.     SHOULDER SURGERY Right     closed manipulation    SHOULDER SURGERY Left     open manipulation    SKIN BIOPSY  11/12/2007    meir acilla and neck--squamous papillomas--lft buttock--subcutaneous abscess, back--compound nevus, lft neck--lentigo simplex, lft chest --junctional nevus, rt forearm--blue nevus    SKIN BIOPSY  12/9/2009    lft thigh--follicular cyst    SKIN BIOPSY  2/10/2010    rt thigh--ruptured epidermal inclusion cyst    TONSILLECTOMY      as a child    URETER STENT PLACEMENT Left 09/30/15    tiems 2 stents Social History:    Social History     Tobacco Use    Smoking status: Never Smoker    Smokeless tobacco: Never Used   Substance Use Topics    Alcohol use: Yes     Comment: once a week                                Counseling given: Not Answered      Vital Signs (Current):   Vitals:    03/05/21 1153   BP: 138/77   Pulse: 79   Resp: 16   Temp: 96.8 °F (36 °C)   TempSrc: Temporal   SpO2: 97%   Weight: 228 lb (103.4 kg)   Height: 6' (1.829 m)                                              BP Readings from Last 3 Encounters:   03/05/21 138/77   03/01/21 118/66   02/18/21 126/64       NPO Status: Time of last liquid consumption: 1700                        Time of last solid consumption: 1700                        Date of last liquid consumption: 03/04/21                        Date of last solid food consumption: 03/04/21    BMI:   Wt Readings from Last 3 Encounters:   03/05/21 228 lb (103.4 kg)   03/01/21 228 lb (103.4 kg)   02/18/21 227 lb 3.2 oz (103.1 kg)     Body mass index is 30.92 kg/m².     CBC:   Lab Results   Component Value Date    WBC 10.3 06/21/2020    RBC 5.65 06/21/2020    RBC 5.33 06/07/2012    HGB 16.5 03/01/2021    HCT 49.7 03/01/2021    MCV 90.2 06/21/2020    RDW 13.3 06/21/2020     06/21/2020     06/07/2012       CMP:   Lab Results   Component Value Date     03/01/2021    K 4.0 03/01/2021     03/01/2021    CO2 23 03/01/2021    BUN 21 03/01/2021    CREATININE 1.00 03/01/2021    GFRAA >60 03/01/2021    LABGLOM >60 03/01/2021    GLUCOSE 162 03/01/2021    PROT 7.5 06/21/2020    CALCIUM 10.0 06/21/2020    BILITOT 0.66 06/21/2020    ALKPHOS 43 06/21/2020    AST 14 06/21/2020    ALT 19 06/21/2020       POC Tests:   Recent Labs     03/05/21  1202   POCGLU 134*       Coags: No results found for: PROTIME, INR, APTT    HCG (If Applicable): No results found for: PREGTESTUR, PREGSERUM, HCG, HCGQUANT     ABGs: No results found for: PHART, PO2ART, ABH0PYH, MCN0JAA, BEART, K4APQQZT Type & Screen (If Applicable):  No results found for: LABABO, LABRH    Drug/Infectious Status (If Applicable):  No results found for: HIV, HEPCAB    COVID-19 Screening (If Applicable):   Lab Results   Component Value Date    COVID19 Not Detected 03/01/2021         Anesthesia Evaluation   no history of anesthetic complications:   Airway: Mallampati: III     Neck ROM: full  Mouth opening: > = 3 FB Dental:          Pulmonary:   (+) asthma:     (-) shortness of breath, sleep apnea and stridor                           Cardiovascular:    (+) hypertension:,     (-) pacemaker, CABG/stent,  angina and  CHF        Rate: normal                    Neuro/Psych:      (-) seizures, TIA and CVA           GI/Hepatic/Renal:   (+) GERD:, renal disease: kidney stones,           Endo/Other:    (+) DiabetesType II DM, , malignancy/cancer (lung cancer history). (-) hypothyroidism, hyperthyroidism               Abdominal:       Abdomen: soft. Vascular:                                   Narrative & Impression    Normal sinus rhythm  Normal ECG  When compared with ECG of 28-DEC-2017 14:21,  No significant change was found   Lab and Collection    EKG 12 Lead - 3/1/2021       Anesthesia Plan      general     ASA 2       Induction: intravenous. Anesthetic plan and risks discussed with patient and spouse.                       Marylin Bhagat MD   3/5/2021

## 2021-03-05 NOTE — ANESTHESIA PRE PROCEDURE
Department of Anesthesiology  Preprocedure Note       Name:  Kevin Maya   Age:  77 y.o.  :  1955                                          MRN:  [id number]         Date:  3/5/2021      Surgeon: Coleen Buitrago):  Aldo Haq MD     Procedure:ESWL EXTRACORPOREAL SHOCK WAVE LITHOTRIPSY (NEX-MED CONF# [id number] - DAISY) (Left )           Medications prior to admission:   Prior to Admission medications    Medication Sig Start Date End Date Taking? Authorizing Provider   losartan (COZAAR) 50 MG tablet TAKE 1 TABLET DAILY 20   Clary Raines MD   VASCEPA 1 g CAPS capsule TAKE 2 CAPSULES DAILY 20   Clary Raines MD   lovastatin (MEVACOR) 20 MG tablet TAKE 1 TABLET NIGHTLY 20   Clary Raines MD   fenofibrate (TRICOR) 145 MG tablet Take 1 tablet by mouth daily 20   Clary Raines MD   B-D UF III MINI PEN NEEDLES 31G X 5 MM MISC INJECT 1 PEN NEEDLE INTO   THE SKIN DAILY 20   Clary Raines MD   ibuprofen (ADVIL;MOTRIN) 600 MG tablet Take 1 tablet by mouth every 6 hours as needed for Pain 20   Avril Jasso DO   FARXIGA 5 MG tablet Take 5 mg by mouth every morning  19   Historical Provider, MD   glimepiride (AMARYL) 4 MG tablet Take 4 mg by mouth 2 times daily  19   Historical Provider, MD   [facility] KWIKPEN 100 UNIT/ML injection pen Inject 60 Units into the skin daily  19   Historical Provider, MD   aspirin 81 MG chewable tablet Take 1 tablet by mouth daily Hold for 5 days after surgery, until 18  Patient taking differently: Take 81 mg by mouth daily  1/10/18   Kisha Montes MD   VENTOLIN  (90 BASE) MCG/ACT inhaler inhale 2 puff every 4 hours prn 16   Historical Provider, MD BROOKSUMEYVONNE XR  MG TB24 tablet Take 2 tablets by mouth daily 8/21/15   Historical Provider, MD       Current medications:    No current facility-administered medications for this visit. No current outpatient medications on file.  SOB (shortness of breath)     with exertion    Type II or unspecified type diabetes mellitus without mention of complication, not stated as uncontrolled     managed by Dr. Pancho Barber Endocrinologist    Ureteral stent retained     in place x 2    Wears glasses        Past Surgical History:        Procedure Laterality Date    BLADDER TUMOR EXCISION  2009    benign    BRONCHOSCOPY      COLONOSCOPY  09/08/2015    2 times with polypectomy- Dr. Mike Reese Right 6/17/2020    HOLMIUM LASER, CYSTOSCOPY, URETEROSCOPY, STENT PLACEMENT performed by Kayla Chery MD at [address] [facility] Left 9-21-15    2 stents in [address].  CYSTOSCOPY  09/30/2015    CYSTOSCOPY Right 06/17/2020    HOLMIUM LASER, CYSTOSCOPY, URETEROSCOPY, STENT PLACEMENT     EYE SURGERY      cataract surgery both eyes    HERNIA REPAIR Left     inguinal    KNEE ARTHROSCOPY      LITHOTRIPSY Left     x2    LITHOTRIPSY  09/30/15    laser    LITHOTRIPSY Left 01/10/2018    LUNG SURGERY Right 05/23/2011    right lower lobe \"lung carcinoid\" removal. Dr. Chelsy Peña Cardiothoracic Surgeon   [facility] ESWL Left 1/10/2018    ESWL EXTRACORPEAL SHOCK WAVE LITHOTRIPSY, POSSIBLE  STENT PLACEMENT ([facility] MED CONF# [id number]) performed by Kayla Chery MD at [address] [place] / [address] Left 6/9/2014    Excision lesion of nose. Dr. Jose Kelly.     SHOULDER SURGERY Right     closed manipulation    SHOULDER SURGERY Left     open manipulation    SKIN BIOPSY  11/12/2007    meir acilla and neck--squamous papillomas--lft buttock--subcutaneous abscess, back--compound nevus, lft neck--lentigo simplex, lft chest --junctional nevus, rt forearm--blue nevus    SKIN BIOPSY  12/9/2009    lft thigh--follicular cyst    SKIN BIOPSY  2/10/2010    rt thigh--ruptured epidermal inclusion cyst    TONSILLECTOMY      as a child  URETER STENT PLACEMENT Left 09/30/15    tiems 2 stents       Social History:    Social History     Tobacco Use    Smoking status: Never Smoker    Smokeless tobacco: Never Used   Substance Use Topics    Alcohol use: Yes     Comment: once a week                                Counseling given: Not Answered      Vital Signs (Current): There were no vitals filed for this visit.                                            BP Readings from Last 3 Encounters:   03/05/21 138/77   03/01/21 118/66   02/18/21 126/64       NPO Status:                                                                                 BMI:   Wt Readings from Last 3 Encounters:   03/05/21 228 lb (103.4 kg)   03/01/21 228 lb (103.4 kg)   02/18/21 227 lb 3.2 oz (103.1 kg)     There is no height or weight on file to calculate BMI.    CBC:   Lab Results   Component Value Date    WBC 10.3 06/21/2020    RBC 5.65 06/21/2020    RBC 5.33 06/07/2012    HGB 16.5 03/01/2021    HCT 49.7 03/01/2021    MCV 90.2 06/21/2020    RDW 13.3 06/21/2020     06/21/2020     06/07/2012       CMP:   Lab Results   Component Value Date     03/01/2021    K 4.0 03/01/2021     03/01/2021    CO2 23 03/01/2021    BUN 21 03/01/2021    CREATININE 1.00 03/01/2021    GFRAA >60 03/01/2021    LABGLOM >60 03/01/2021    GLUCOSE 162 03/01/2021    PROT 7.5 06/21/2020    CALCIUM 10.0 06/21/2020    BILITOT 0.66 06/21/2020    ALKPHOS 43 06/21/2020    AST 14 06/21/2020    ALT 19 06/21/2020       POC Tests:   Recent Labs     03/05/21  1202   POCGLU 134*       Coags: No results found for: PROTIME, INR, APTT    HCG (If Applicable): No results found for: PREGTESTUR, PREGSERUM, HCG, HCGQUANT     ABGs: No results found for: PHART, PO2ART, VYC1NZA, DDU8YLB, BEART, H5CKXRYU     Type & Screen (If Applicable):  No results found for: LABABO, [address]    Anesthesia Evaluation  Patient summary reviewed and Nursing notes reviewed no history of anesthetic complications: Airway: Mallampati: II     Neck ROM: full  Mouth opening: > = 3 FB Dental:          Pulmonary:   (+) asthma:     (-) shortness of breath, sleep apnea and stridor                           Cardiovascular:    (+) hypertension:,     (-) pacemaker, past MI, CAD, CABG/stent, dysrhythmias,  angina and  CHF        Rate: normal                    Neuro/Psych:      (-) seizures, TIA and CVA           GI/Hepatic/Renal:        (-) GERD, liver disease and no renal disease       Endo/Other:    (+) DiabetesType II DM, poorly controlled, , .    (-) hypothyroidism, hyperthyroidism, blood dyscrasia               Abdominal:       Abdomen: soft. Vascular:                                   Narrative     Normal sinus rhythm  Normal ECG  When compared with ECG of 25-FEB-2000 09:24,  No significant change was found   Scans on Order [id number]     Scan on 12/29/2017 11:25 AM by SCANNING, [facility]            Anesthesia Plan      general     ASA 3       Induction: intravenous. MIPS: Postoperative opioids intended and Prophylactic antiemetics administered. Anesthetic plan and risks discussed with patient. Plan discussed with CRNA.                   Julaine Leventhal, MD   3/5/2021

## 2021-03-05 NOTE — OP NOTE
FACILITY:  [address] [place] [address]      DATE:  3/5/2021    SURGEON: Dr. Sydnee Yung    Asst: Dr. Wendy Villatoro MD     PREOPERATIVE DIAGNOSIS: L. Renal Calculi     POSTOPERATIVE DIAGNOSIS:  same     PROCEDURE:  Left extracorporeal shockwave lithotripsy. Blood Loss: None     ANESTHESIA:  General   Specimen:  Stones for analysis    INDICATIONS:  Patient is a 77 y.o M with hx of recurrent nephrolithiasis. Patient underwent a Right ureteroscopy, laser lithotripsy last year. He has a history of poor stent tolerance. KUB showed 3 stone in L. Kidney with largest measuring 9mm. He presents today for the aforementiomed procedure. All treatment options were dicussed including risks, benefits, alternatives, goals and possible complications. Patient elected above mentioned procedure. Consent was obtained and patient elected to proceed.     DESCRIPTION OF PROCEDURE:  The patient was placed in the supine position and underwent general anesthesia induction. He was positioned in supine position and EPC Cuffs were on and running. Timeout was performed confirming patient, procedure, side, all in room agreed. Using fluoroscopy the stone was visualized in the Left renal pelvis as noted previously. The stone was then treated with 3000 shocks with  Maximum power levels to 7. He was clinically stable throughout with no hypertension noted. There was a 2 minute pause after 200 shocks to try and decrease the postoperative bleeding. The patient's stone seemed to break up throughout the case and he did well with no complications. He was awakened by anesthesia and transferred to PACU in stable condition.      CONDITION:  Good. PLAN:   Patient will be dc after meeting anesthesia criteria for discharge. He will follow up in clinic in 2 weeks.  Medications for medical expulsive therapy ordered

## 2021-03-05 NOTE — TELEPHONE ENCOUNTER
Per Dr. Hassan Borne patient is to follow up in 3 1/2 - 4 months from ESWL done on 3/5/2021. Prior to follow up patient is to have a litholink, BMP, parathyroid, uric acid and KUB done prior to appointment. Litholink sent on 3/5/2021. Please place additional orders. Left message with patient to schedule follow up.

## 2021-03-05 NOTE — H&P
[place], [facility], [place], [facility]   Urology H&P      Patient:  Rochelle Laboy  MRN: [id number]  YOB: 1955    CHIEF COMPLAINT: Left nephrolithiasis    HISTORY OF PRESENT ILLNESS:   The patient is a 77 y.o. male who presents with left nephrolithiasis seen on KUB. Patient has history of recurrent nephrolithiasis and current KUB showed 3 prominent stones on the left side with the largest between 9 to 10 mm. Prior stone analysis showed 80% calcium oxalate monohydrate, 20% calcium oxalate dihydrate. Patient's old records, notes and chart reviewed and summarized above. Past Medical History:    Past Medical History:   Diagnosis Date    Asthma     Dr. Lauren Whitaker. 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 Camille Wiseman PCP    Hypertension     managed by Dr. Camille Wiseman PCP.  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 Luz Fuentes Ureteral stent retained     in place x 2    Wears glasses        Past Surgical History:    Past Surgical History:   Procedure Laterality Date    BLADDER TUMOR EXCISION  2009    benign    BRONCHOSCOPY      COLONOSCOPY  09/08/2015    2 Active member of club or organization: Not on file     Attends meetings of clubs or organizations: Not on file     Relationship status: Not on file    Intimate partner violence     Fear of current or ex partner: Not on file     Emotionally abused: Not on file     Physically abused: Not on file     Forced sexual activity: Not on file   Other Topics Concern    Not on file   Social History Narrative    Not on file       Family History:    Family History   Problem Relation Age of Onset    Heart Disease Mother     Diabetes Mother     Heart Disease Father     Diabetes Sister     Diabetes Maternal Grandmother     Diabetes Brother        REVIEW OF SYSTEMS:  A comprehensive 14 point review of systems was obtained. Constitutional: No fatigue  Eyes: No blurry vision  Ears, nose, mouth, throat, face: No ringing in the ears; no facial droop. Respiratory: No cough or cold. Cardiovascular: No palpitations  Gastrointestinal: No diarrhea or constipation. Genitourinary: No burning with urination  Integument/Skin: No rashes  Hematologic/Lymphatic: No easy bruising  Musculoskeletal: No muscle pains  Neurologic: No weakness in the extremities. Psychiatric: No depression or suicidal thoughts. Endocrine: No heat or cold intolerances. Allergic/Immunologic: No current seasonal allergies; no skin hives. Physical Exam:      This a 77 y.o. male   There were no vitals filed for this visit. Constitutional: Patient in no acute distress. Neuro: alert and oriented to person place and time. Head: Atraumatic and normocephalic. Neck: Trachea midline. Ext: 2+ radial pulses bilaterally. Psych: Mood and affect normal.  Skin: No rashes or bruising present. Lungs: Respiratory effort normal.  Cardiovascular:  Regular rhythm. Abdomen: Soft, non-tender, non-distended. Neither side has CVA tenderness on exam.  Bladder non-tender and not distended. Lymphatics: no palpable lymphadenopathy  Pelvic exam: deferred.   Rectal exam not indicated. Labs:  No results for input(s): WBC, HGB, HCT, MCV, PLT in the last 72 hours. No results for input(s): NA, K, CL, CO2, PHOS, BUN, CREATININE in the last 72 hours. Invalid input(s): CA    No results for input(s): COLORU, PHUR, LABCAST, WBCUA, RBCUA, MUCUS, TRICHOMONAS, YEAST, BACTERIA, CLARITYU, SPECGRAV, LEUKOCYTESUR, UROBILINOGEN, Itzel Bharat in the last 72 hours. Invalid input(s): NITRATE, GLUCOSEUKETONESUAMORPHOUS        -----------------------------------------------------------------  Imaging Results:  No results found. Assessment and Plan   Impression:   problem list:  History of recurrent nephrolithiasis status post right ureteroscopy last year          Plan:   To OR for left ESWL; KUB showed 3 prominent left-sided stones with the largest between 9 and 10 mm        Pacheco Stewart  8:30 AM 3/5/2021

## 2021-04-22 DIAGNOSIS — Z79.4 TYPE 2 DIABETES MELLITUS WITHOUT COMPLICATION, WITH LONG-TERM CURRENT USE OF INSULIN (HCC): ICD-10-CM

## 2021-04-22 DIAGNOSIS — E11.9 TYPE 2 DIABETES MELLITUS WITHOUT COMPLICATION, WITH LONG-TERM CURRENT USE OF INSULIN (HCC): ICD-10-CM

## 2021-04-22 RX ORDER — PEN NEEDLE, DIABETIC 31 GX5/16"
NEEDLE, DISPOSABLE MISCELLANEOUS
Qty: 100 EACH | Refills: 3 | Status: SHIPPED | OUTPATIENT
Start: 2021-04-22 | End: 2021-07-19

## 2021-05-28 ENCOUNTER — HOSPITAL ENCOUNTER (OUTPATIENT)
Dept: GENERAL RADIOLOGY | Age: 66
Discharge: HOME | End: 2021-05-30
Payer: COMMERCIAL

## 2021-05-28 ENCOUNTER — HOSPITAL ENCOUNTER (OUTPATIENT)
Age: 66
Discharge: HOME | End: 2021-05-30
Payer: COMMERCIAL

## 2021-05-28 DIAGNOSIS — N20.0 KIDNEY STONES: ICD-10-CM

## 2021-05-28 PROCEDURE — 74018 RADEX ABDOMEN 1 VIEW: CPT

## 2021-06-03 LAB
ALBUMIN SERPL-MCNC: NORMAL G/DL
ALP BLD-CCNC: NORMAL U/L
ALT SERPL-CCNC: NORMAL U/L
ANION GAP SERPL CALCULATED.3IONS-SCNC: NORMAL MMOL/L
AST SERPL-CCNC: NORMAL U/L
AVERAGE GLUCOSE: 148
BILIRUB SERPL-MCNC: NORMAL MG/DL
BUN BLDV-MCNC: NORMAL MG/DL
CALCIUM SERPL-MCNC: NORMAL MG/DL
CHLORIDE BLD-SCNC: NORMAL MMOL/L
CO2: NORMAL
CREAT SERPL-MCNC: NORMAL MG/DL
GFR CALCULATED: NORMAL
GLUCOSE BLD-MCNC: 150 MG/DL
HBA1C MFR BLD: 6.6 %
POTASSIUM SERPL-SCNC: NORMAL MMOL/L
SODIUM BLD-SCNC: NORMAL MMOL/L
TOTAL PROTEIN: NORMAL

## 2021-06-04 ENCOUNTER — TELEPHONE (OUTPATIENT)
Dept: UROLOGY | Age: 66
End: 2021-06-04

## 2021-06-04 NOTE — TELEPHONE ENCOUNTER
Writer spoke with patient and patient did not complete Litholink order. Patient was notified that apt would then have to be canceled. He did reschedule and writer did remind him to complete that order before that apt. Patient verbalized understanding. He also wanted to let  know that he has also passed four stones. Call was ended.

## 2021-06-16 ENCOUNTER — HOSPITAL ENCOUNTER (OUTPATIENT)
Age: 66
Discharge: HOME | End: 2021-06-16
Payer: COMMERCIAL

## 2021-06-16 LAB
CHOLESTEROL/HDL RATIO: 5.3
CHOLESTEROL: 126 MG/DL
HDLC SERPL-MCNC: 24 MG/DL
LDL CHOLESTEROL DIRECT: 48 MG/DL
LDL CHOLESTEROL: ABNORMAL MG/DL (ref 0–130)
TRIGL SERPL-MCNC: 439 MG/DL
VLDLC SERPL CALC-MCNC: ABNORMAL MG/DL (ref 1–30)

## 2021-06-16 PROCEDURE — 36415 COLL VENOUS BLD VENIPUNCTURE: CPT

## 2021-06-16 PROCEDURE — 80061 LIPID PANEL: CPT

## 2021-06-16 PROCEDURE — 83721 ASSAY OF BLOOD LIPOPROTEIN: CPT

## 2021-06-21 ENCOUNTER — OFFICE VISIT (OUTPATIENT)
Dept: UROLOGY | Age: 66
End: 2021-06-21
Payer: COMMERCIAL

## 2021-06-21 VITALS
WEIGHT: 228 LBS | TEMPERATURE: 96.5 F | BODY MASS INDEX: 30.88 KG/M2 | SYSTOLIC BLOOD PRESSURE: 139 MMHG | DIASTOLIC BLOOD PRESSURE: 80 MMHG | HEIGHT: 72 IN | HEART RATE: 87 BPM

## 2021-06-21 DIAGNOSIS — R82.992 HYPEROXALURIA: ICD-10-CM

## 2021-06-21 DIAGNOSIS — N40.1 BENIGN LOCALIZED PROSTATIC HYPERPLASIA WITH LOWER URINARY TRACT SYMPTOMS (LUTS): ICD-10-CM

## 2021-06-21 DIAGNOSIS — N20.0 KIDNEY STONES: Primary | ICD-10-CM

## 2021-06-21 PROCEDURE — 99214 OFFICE O/P EST MOD 30 MIN: CPT | Performed by: UROLOGY

## 2021-06-21 ASSESSMENT — ENCOUNTER SYMPTOMS
WHEEZING: 0
EYE PAIN: 0
ABDOMINAL PAIN: 0
NAUSEA: 0
RESPIRATORY NEGATIVE: 1
BACK PAIN: 0
EYES NEGATIVE: 1
VOMITING: 0
CONSTIPATION: 0
COUGH: 0
DIARRHEA: 1
SHORTNESS OF BREATH: 0
EYE REDNESS: 0

## 2021-06-21 NOTE — PROGRESS NOTES
[address] [address]  Dept:  [facility] Urology Office Note - Established    Patient:  Jennifer Mercer  YOB: 1955  Date: 6/21/2021    The patient is a 77 y.o. male who presents todayfor evaluation of the following problems:   Chief Complaint   Patient presents with    Check-Up     4 month check up with [employer] results       HPI  Pasha Knox is a 59-year-old gentleman with a history of stones. He does also have BPH. His KUB x-ray does show stones that are smaller than prior. He has passed a few sizable stones since that x-ray. Generally he has no urinary symptoms or flank pain. His 24-hour urine does demonstrate significant hyperoxaluria. He does also have a year high urinary sodium content. Summary of old records: N/A    Additional History: N/A    Procedures Today: N/A    Urinalysis today:  No results found for this visit on 06/21/21.   Last several PSA's:  Lab Results   Component Value Date    PSA 0.18 04/05/2018    PSA 0.17 12/28/2017    PSA 0.20 08/07/2015     Last total testosterone:  No results found for: TESTOSTERONE    AUA Symptom Score (6/21/2021):                               Last BUN and creatinine:  Lab Results   Component Value Date    BUN 21 03/01/2021     Lab Results   Component Value Date    CREATININE 1.00 03/01/2021       Additional Lab/Culture results: none    Imaging Reviewed during this Office Visit: kub left renal stones, smaller than prior  (results were independently reviewed by physician and radiology report verified)    PAST MEDICAL, FAMILY AND SOCIAL HISTORY UPDATE:  Past Medical History:   Diagnosis Date    Asthma     Dr. Juan Jose Gilliam. 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 Yanira Orozco PCP    Hypertension     managed by Dr. Yanira Orozco PCP. 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 Jagruti Beckett Endocrinologist    Ureteral stent retained     in place x 2    Wears glasses      Past Surgical History:   Procedure Laterality Date    BLADDER TUMOR EXCISION  2009    benign    BRONCHOSCOPY      COLONOSCOPY  09/08/2015    2 times with polypectomy- Dr. La Nena Mejia Right 06/17/2020    HOLMIUM LASER, CYSTOSCOPY, URETEROSCOPY, STENT PLACEMENT performed by Dora Levi MD at [address] [facility] [address] Left 09/21/2015    2 stents in Lt.     CYSTOSCOPY  09/30/2015    EYE SURGERY      cataract surgery both eyes    HERNIA REPAIR Left     inguinal    KNEE ARTHROSCOPY      LITHOTRIPSY Left     x2    LITHOTRIPSY  09/30/2015    laser    LITHOTRIPSY Left 03/05/2021    LITHOTRIPSY Left 3/5/2021    [facility] EXTRACORPOREAL SHOCK WAVE LITHOTRIPSY  (NEX-MED CONF# [id number] - DAISY) performed by Dora Levi MD at [address] Right 05/23/2011    right lower lobe \"lung carcinoid\" removal. Dr. Jeanie Taylor [facility] Left 01/10/2018    [facility] [address] LITHOTRIPSY, POSSIBLE  STENT PLACEMENT (NEX MED CONF# [id number]) performed by Dora Levi MD at [address] PRE-MALIGNANT / BENIGN SKIN LESION EXCISION Left 06/09/2014    Excision lesion of nose. Dr. Janes Robles.     SHOULDER SURGERY Right     closed manipulation    SHOULDER SURGERY Left     open manipulation    SKIN BIOPSY  11/12/2007    meir acilla and neck--squamous papillomas--lft buttock--subcutaneous abscess, back--compound nevus, lft neck--lentigo simplex, lft chest --junctional nevus, rt forearm--blue nevus    SKIN BIOPSY  12/09/2009    lft thigh--follicular cyst    SKIN BIOPSY  02/10/2010    rt thigh--ruptured epidermal inclusion cyst    TONSILLECTOMY      as a child    URETER STENT PLACEMENT Left 09/30/2015    tiems 2 stents     Family History   Problem Relation Age of Onset    Heart Disease Mother     Diabetes Mother     Heart Disease Father     Diabetes Sister     Diabetes Maternal Grandmother     Diabetes Brother      Outpatient Medications Marked as Taking for the 6/21/21 encounter (Office Visit) with Nasim Caba MD   Medication Sig Dispense Refill    B-D UF III MINI PEN NEEDLES 31G X 5 MM MISC INJECT 1 PEN NEEDLE INTO   THE SKIN DAILY 100 each 3    losartan (COZAAR) 50 MG tablet TAKE 1 TABLET DAILY 90 tablet 3    VASCEPA 1 g CAPS capsule TAKE 2 CAPSULES DAILY 180 capsule 3    lovastatin (MEVACOR) 20 MG tablet TAKE 1 TABLET NIGHTLY 90 tablet 3    fenofibrate (TRICOR) 145 MG tablet Take 1 tablet by mouth daily 90 tablet 3    ibuprofen (ADVIL;MOTRIN) 600 MG tablet Take 1 tablet by mouth every 6 hours as needed for Pain 30 tablet 0    FARXIGA 5 MG tablet Take 5 mg by mouth every morning       glimepiride (AMARYL) 4 MG tablet Take 4 mg by mouth 2 times daily       BASAGLEDMUNDO BUTLERPEN 100 UNIT/ML injection pen Inject 60 Units into the skin daily       aspirin 81 MG chewable tablet Take 1 tablet by mouth daily Hold for 5 days after surgery, until 1/16/18 (Patient taking differently: Take 81 mg by mouth daily ) 30 tablet 0    VENTOLIN  (90 BASE) MCG/ACT inhaler inhale 2 puff every 4 hours prn  0    JANUMET XR  MG TB24 tablet Take 2 tablets by mouth daily         Seasonal  Social History     Tobacco Use   Smoking Status Never Smoker   Smokeless Tobacco Never Used     (Ifpatient a smoker, smoking cessation counseling offered)    Social History     Substance and Sexual Activity   Alcohol Use Yes    Comment: once a week       REVIEW OF SYSTEMS:  Review of Systems    Physical Exam:      Vitals:    06/21/21 0846   BP: 139/80   Pulse: 87   Temp: 96.5 °F (35.8 °C)     Body mass index is 30.92 kg/m². Patient is a 77 y.o. male in no acute distress and alert and oriented to person, place and time. Physical Exam  Constitutional: Patient in no acute distress. Neuro: Alert and oriented to person, place and time. Psych: Mood normal, affect normal  Skin: No rash noted  HEENT: Head: Normocephalic andatraumatic  Conjunctivae and EOM are normal. Pupils are equal, round  Nose:Normal  Right External Ear: Normal; Left External Ear: Normal  Mouth: Mucosa Moist  Neck: Supple      Assessment and Plan      1. Kidney stones    2. Benign localized prostatic hyperplasia with lower urinary tract symptoms (LUTS)    3. Hyperoxaluria           Plan:   Refer to Dr. Jayro Colbert for hyperoxaluria and stones  Cont flomax  F/u 6 mo kub      Return in about 6 months (around 12/21/2021) for kub. Prescriptions Ordered:  No orders of the defined types were placed in this encounter. Orders Placed:  Orders Placed This Encounter   Procedures    XR ABDOMEN (KUB) (SINGLE AP VIEW)     Standing Status:   Future     Standing Expiration Date:   6/21/2022     Order Specific Question:   Reason for exam:     Answer:   kidney stones           Molly Zheng MD    Agree with the ROS entered by the MA.

## 2021-06-21 NOTE — PROGRESS NOTES
Review of Systems   Constitutional: Negative. Negative for appetite change, chills and fatigue. Eyes: Negative. Negative for pain, redness and visual disturbance. Respiratory: Negative. Negative for cough, shortness of breath and wheezing. Cardiovascular: Negative. Negative for chest pain and leg swelling. Gastrointestinal: Positive for diarrhea. Negative for abdominal pain, constipation, nausea and vomiting. Genitourinary: Positive for frequency. Negative for difficulty urinating, dysuria, flank pain, hematuria and urgency. Musculoskeletal: Negative. Negative for back pain, joint swelling and myalgias. Skin: Negative. Negative for rash and wound. Neurological: Negative. Negative for dizziness, weakness and numbness. Hematological: Does not bruise/bleed easily.

## 2021-07-18 DIAGNOSIS — Z79.4 TYPE 2 DIABETES MELLITUS WITHOUT COMPLICATION, WITH LONG-TERM CURRENT USE OF INSULIN (HCC): ICD-10-CM

## 2021-07-18 DIAGNOSIS — E11.9 TYPE 2 DIABETES MELLITUS WITHOUT COMPLICATION, WITH LONG-TERM CURRENT USE OF INSULIN (HCC): ICD-10-CM

## 2021-07-19 RX ORDER — PEN NEEDLE, DIABETIC 31 GX5/16"
NEEDLE, DISPOSABLE MISCELLANEOUS
Qty: 100 EACH | Refills: 3 | Status: SHIPPED | OUTPATIENT
Start: 2021-07-19 | End: 2022-05-27

## 2021-08-04 ENCOUNTER — OFFICE VISIT (OUTPATIENT)
Dept: PRIMARY CARE CLINIC | Age: 66
End: 2021-08-04
Payer: COMMERCIAL

## 2021-08-04 VITALS
DIASTOLIC BLOOD PRESSURE: 76 MMHG | SYSTOLIC BLOOD PRESSURE: 124 MMHG | BODY MASS INDEX: 30.84 KG/M2 | WEIGHT: 227.4 LBS | OXYGEN SATURATION: 98 % | HEART RATE: 83 BPM

## 2021-08-04 DIAGNOSIS — L72.3 SEBACEOUS CYST: ICD-10-CM

## 2021-08-04 DIAGNOSIS — L72.3 INFECTED SEBACEOUS CYST: Primary | ICD-10-CM

## 2021-08-04 DIAGNOSIS — L08.9 INFECTED SEBACEOUS CYST: Primary | ICD-10-CM

## 2021-08-04 PROCEDURE — 99212 OFFICE O/P EST SF 10 MIN: CPT | Performed by: FAMILY MEDICINE

## 2021-08-04 RX ORDER — MONTELUKAST SODIUM 10 MG/1
TABLET ORAL
COMMUNITY
Start: 2021-07-04 | End: 2021-12-17

## 2021-08-04 RX ORDER — DOXYCYCLINE HYCLATE 100 MG
100 TABLET ORAL 2 TIMES DAILY
Qty: 20 TABLET | Refills: 0 | Status: SHIPPED | OUTPATIENT
Start: 2021-08-04 | End: 2021-08-28 | Stop reason: SDUPTHER

## 2021-08-04 NOTE — PROGRESS NOTES
Fina Ureña is a 77 y.o. Sol Peel presents today for his medical conditions/complaints as noted below. Chief Complaint   Patient presents with    Skin Problem     pt c/o inflammation on the left shoulder that he states may be an infected ingrown hair         HPI:     HPI    Left upper back leaking cyst. Has been draining. Not much pain, has improved and gotten smaller. Current Outpatient Medications   Medication Sig Dispense Refill    montelukast (SINGULAIR) 10 MG tablet       doxycycline hyclate (VIBRA-TABS) 100 MG tablet Take 1 tablet by mouth 2 times daily for 10 days 20 tablet 0    B-D UF III MINI PEN NEEDLES 31G X 5 MM MISC INJECT 1 PEN NEEDLE INTO   THE SKIN DAILY 100 each 3    losartan (COZAAR) 50 MG tablet TAKE 1 TABLET DAILY 90 tablet 3    VASCEPA 1 g CAPS capsule TAKE 2 CAPSULES DAILY 180 capsule 3    lovastatin (MEVACOR) 20 MG tablet TAKE 1 TABLET NIGHTLY 90 tablet 3    fenofibrate (TRICOR) 145 MG tablet Take 1 tablet by mouth daily 90 tablet 3    ibuprofen (ADVIL;MOTRIN) 600 MG tablet Take 1 tablet by mouth every 6 hours as needed for Pain 30 tablet 0    FARXIGA 5 MG tablet Take 5 mg by mouth every morning       glimepiride (AMARYL) 4 MG tablet Take 4 mg by mouth 2 times daily       BASAGLAR KWIKPEN 100 UNIT/ML injection pen Inject 60 Units into the skin daily       aspirin 81 MG chewable tablet Take 1 tablet by mouth daily Hold for 5 days after surgery, until 1/16/18 (Patient taking differently: Take 81 mg by mouth daily ) 30 tablet 0    VENTOLIN  (90 BASE) MCG/ACT inhaler inhale 2 puff every 4 hours prn  0    JANUMET XR  MG TB24 tablet Take 2 tablets by mouth daily       No current facility-administered medications for this visit. Allergies   Allergen Reactions    Seasonal        Subjective:     Review of Systems   Constitutional: Negative for fever.        Objective:     /76   Pulse 83   Wt 227 lb 6.4 oz (103.1 kg)   SpO2 98%   BMI 30.84 kg/m² Physical Exam  Vitals and nursing note reviewed. Constitutional:       Appearance: Normal appearance. HENT:      Head: Normocephalic and atraumatic. Skin:     Findings: Rash present. Comments: Left upper back: rash from bandaid: large pore with scant amount of medium yellow d/c, mild induration about 2 cm, not raised, and mildly red    Neurological:      Mental Status: He is alert and oriented to person, place, and time. Psychiatric:         Mood and Affect: Mood normal.         Behavior: Behavior normal.         Thought Content: Thought content normal.         Assessment:       Diagnosis Orders   1. Infected sebaceous cyst  doxycycline hyclate (VIBRA-TABS) 100 MG tablet   2. Sebaceous cyst  [facility]) - Gage Velez MD, Plastic Surgery, [place]        Plan:      No follow-ups on file. Has seen Dr Kasey Garces in past.   Uses gauze and paper tape due to rash from bandaid. Orders Placed This Encounter   Procedures    [facility]) - Gage Velez MD, Plastic Surgery, [place]     Referral Priority:   Routine     Referral Type:   Eval and Treat     Referral Reason:   Specialty Services Required     Referred to Provider:   Sue Ross MD     Requested Specialty:   Plastic Surgery     Number of Visits Requested:   1     Orders Placed This Encounter   Medications    doxycycline hyclate (VIBRA-TABS) 100 MG tablet     Sig: Take 1 tablet by mouth 2 times daily for 10 days     Dispense:  20 tablet     Refill:  0      Reviewed medications and possibleside effects.        Electronically signed by Mickie Delaney MD on 8/4/2021 at 11:48 AM

## 2021-08-23 RX ORDER — FENOFIBRATE 145 MG/1
TABLET, COATED ORAL
Qty: 90 TABLET | Refills: 3 | Status: SHIPPED | OUTPATIENT
Start: 2021-08-23 | End: 2022-08-08 | Stop reason: SDUPTHER

## 2021-08-28 DIAGNOSIS — L08.9 INFECTED SEBACEOUS CYST: ICD-10-CM

## 2021-08-28 DIAGNOSIS — L72.3 INFECTED SEBACEOUS CYST: ICD-10-CM

## 2021-08-28 RX ORDER — DOXYCYCLINE HYCLATE 100 MG
100 TABLET ORAL 2 TIMES DAILY
Qty: 20 TABLET | Refills: 0 | Status: SHIPPED | OUTPATIENT
Start: 2021-08-28 | End: 2021-09-07

## 2021-08-28 NOTE — TELEPHONE ENCOUNTER
----- Message from [employer] sent at 8/28/2021  8:46 AM EDT -----  Subject: Refill Request    QUESTIONS  Name of Medication? doxycycline hyclate (VIBRA-TABS) 100 MG tablet  Patient-reported dosage and instructions? 100mg 1 x day   How many days do you have left? 4  Preferred Pharmacy? [id number]  Pharmacy phone number (if available)? [phone]  Additional Information for Provider? UTI symptoms came back and Chasity Rico has   been taking the antibiotics he has left over. He is close to running out   and would like a refill of this medication.   ---------------------------------------------------------------------------  --------------  CALL BACK INFO  What is the best way for the office to contact you? OK to leave message on   voicemail  Preferred Call Back Phone Number?  [phone]

## 2021-09-29 ENCOUNTER — HOSPITAL ENCOUNTER (OUTPATIENT)
Age: 66
Setting detail: OUTPATIENT SURGERY
Discharge: HOME | End: 2021-09-29
Attending: PLASTIC SURGERY | Admitting: PLASTIC SURGERY
Payer: COMMERCIAL

## 2021-09-29 VITALS
TEMPERATURE: 97.4 F | SYSTOLIC BLOOD PRESSURE: 103 MMHG | BODY MASS INDEX: 30.4 KG/M2 | RESPIRATION RATE: 17 BRPM | HEART RATE: 75 BPM | DIASTOLIC BLOOD PRESSURE: 58 MMHG | WEIGHT: 224.4 LBS | HEIGHT: 72 IN | OXYGEN SATURATION: 96 %

## 2021-09-29 LAB — GLUCOSE BLD-MCNC: 157 MG/DL (ref 75–110)

## 2021-09-29 PROCEDURE — 2500000003 HC RX 250 WO HCPCS: Performed by: PLASTIC SURGERY

## 2021-09-29 PROCEDURE — 2709999900 HC NON-CHARGEABLE SUPPLY: Performed by: PLASTIC SURGERY

## 2021-09-29 PROCEDURE — 7100000010 HC PHASE II RECOVERY - FIRST 15 MIN: Performed by: PLASTIC SURGERY

## 2021-09-29 PROCEDURE — 3600000002 HC SURGERY LEVEL 2 BASE: Performed by: PLASTIC SURGERY

## 2021-09-29 PROCEDURE — 7100000011 HC PHASE II RECOVERY - ADDTL 15 MIN: Performed by: PLASTIC SURGERY

## 2021-09-29 PROCEDURE — 82947 ASSAY GLUCOSE BLOOD QUANT: CPT

## 2021-09-29 PROCEDURE — 3600000012 HC SURGERY LEVEL 2 ADDTL 15MIN: Performed by: PLASTIC SURGERY

## 2021-09-29 PROCEDURE — 88304 TISSUE EXAM BY PATHOLOGIST: CPT

## 2021-09-29 RX ORDER — LIDOCAINE HYDROCHLORIDE AND EPINEPHRINE 10; 10 MG/ML; UG/ML
INJECTION, SOLUTION INFILTRATION; PERINEURAL PRN
Status: DISCONTINUED | OUTPATIENT
Start: 2021-09-29 | End: 2021-09-29 | Stop reason: ALTCHOICE

## 2021-09-29 ASSESSMENT — PAIN SCALES - GENERAL: PAINLEVEL_OUTOF10: 0

## 2021-09-29 ASSESSMENT — PAIN - FUNCTIONAL ASSESSMENT: PAIN_FUNCTIONAL_ASSESSMENT: 0-10

## 2021-09-29 NOTE — BRIEF OP NOTE
Brief Postoperative Note      Patient: Leigh Hernandez  YOB: 1955  MRN: [id number]    Date of Procedure: 9/29/2021    Pre-Op Diagnosis: D48.5   BACK MASS    Post-Op Diagnosis: Same       Procedure(s):  BACK LESION BIOPSY EXCISION    Surgeon(s):  Ines Grey MD    Assistant:  * No surgical staff found *    Anesthesia: Local    Estimated Blood Loss (mL): Minimal    Complications: None    Specimens:   ID Type Source Tests Collected by Time Destination   A : Back lesion biopsy Tissue Tissue SURGICAL PATHOLOGY Ines Grey MD 9/29/2021 1509        Implants:  * No implants in log *      Drains: * No LDAs found *    Findings:     Electronically signed by Ines Grey MD on 9/29/2021 at 3:19 PM

## 2021-09-29 NOTE — H&P
Subjective:      Patient ID: Angelica Rand is a 77 y.o. male.     HPI  Patient is here today for a cyst on the back. Pt was seen by Dr. Petty Vargas who had given him an antibiotic which shrank the cyst. Pt's wife states she expressed thick yellow/white drainage. Pt denies pain at this time but states it was painful when it was larger. Pt has a hx of cyst's to his back and groin. No other concerns at this time.     Review of Systems   Constitutional: Negative. HENT: Negative for congestion and trouble swallowing. Respiratory: Negative for cough and shortness of breath. Cardiovascular: Negative for chest pain. Gastrointestinal: Negative for abdominal pain. Neurological: Negative for light-headedness and headaches. Psychiatric/Behavioral: Negative for dysphoric mood. Medical History    Medical History    Diagnosis Date Comment Source   Asthma  Dr. Lázaro Mitchell. 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 Bossman Candelaria Endocrinologist       Other Medical History    Diagnosis Date Comment Source   Benign bladder tumor      Bilateral hearing loss  uses aids    BPH (benign prostatic hyperplasia)      H/O arthroscopy of left knee      H/O seasonal allergies      Hearing aid worn  MICHAEL    Hematuria      Hx of lithotripsy      Hyperlipidemia  managed by Dr. Petty Vargas PCP    Hypertension  managed by Dr. Petty Vargas PCP.  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 lesion of left side of nose    Snores      SOB (shortness of breath)  with exertion    Ureteral stent retained  in place x 2    Wears glasses Family History    Problem Relation Age of Onset Comments   Diabetes Brother     Diabetes Maternal Grandmother     Diabetes Mother     Diabetes Sister     Heart Disease Father     Heart Disease Mother     Social History    Tobacco History    Smoking Status   Never Smoker   Smokeless Tobacco Use   Never Used   Alcohol History    Alcohol Use Status   Yes Comment   once a week   Drug Use    Drug Use Status   No   Sexual Activity    Sexually Active   Not Asked    Surgical History    Procedure Laterality Date Comment Source   BLADDER TUMOR EXCISION  2009 benign    BRONCHOSCOPY       COLONOSCOPY  09/08/2015 2 times with polypectomy- Dr. Nolvia Gonzales [facility] Right 06/17/2020 HOLMIUM LASER, CYSTOSCOPY, URETEROSCOPY, STENT PLACEMENT performed by Nicole Lopez MD at [address] [facility] Left 09/21/2015 2 stents in [address] [place]. CYSTOSCOPY  09/30/2015     EYE SURGERY   cataract surgery both eyes    HERNIA REPAIR Left  inguinal    KNEE ARTHROSCOPY       LITHOTRIPSY Left  x2    LITHOTRIPSY  09/30/2015 laser    LITHOTRIPSY Left 03/05/2021     LITHOTRIPSY Left 3/5/2021 [facility] EXTRACORPOREAL SHOCK WAVE LITHOTRIPSY  (NEX-MED CONF# [id number] - DAISY) performed by Nicole Lopez MD at . [place] [id number] Right 05/23/2011 right lower lobe \"lung carcinoid\" removal. Dr. Norm Horton [facility] Left 01/10/2018 [facility] EXTRACORPEAL SHOCK WAVE LITHOTRIPSY, POSSIBLE  STENT PLACEMENT (NEX MED CONF# [id number]) performed by Nicole Lopez MD at [address] [facility] [address] [facility] Left 06/09/2014 Excision lesion of nose. Dr. Lollie Crigler.     SHOULDER SURGERY Right  closed manipulation    SHOULDER SURGERY Left  open manipulation    SKIN BIOPSY  11/12/2007 meir acilla and neck--squamous papillomas--lft buttock--subcutaneous abscess, back--compound nevus, lft neck--lentigo simplex, lft chest --junctional nevus, rt forearm--blue and atraumatic. Eyes:      Extraocular Movements: Extraocular movements intact. Conjunctiva/sclera: Conjunctivae normal.      Pupils: Pupils are equal, round, and reactive to light. Pulmonary:      Effort: Pulmonary effort is normal.   Musculoskeletal:        Back:    Skin:     General: Skin is warm and dry. Neurological:      General: No focal deficit present. Mental Status: He is alert and oriented to person, place, and time.          Assessment:   Cyst/mass of back. Plan:   Scheduled for excision. I have discussed with the patient the indication, alternatives, and the possible risks and/or complications which include but are not limited to those delineated on the consent form for the planned procedure and the anesthesia methods. All questions were answered to patient's satisfaction. Consent was reviewed and signed.

## 2021-09-30 NOTE — OP NOTE
[address] [facility]                                OPERATIVE REPORT    PATIENT NAME: Carroll Luis                     :        1955  MED REC NO:   [id number]                             ROOM:  ACCOUNT NO:   [de-identified]                           ADMIT DATE: 2021  PROVIDER:     Shakeel Terry    DATE OF PROCEDURE:  2021    PREOPERATIVE DIAGNOSIS:  Mass of back. POSTOPERATIVE DIAGNOSIS:  Mass of back. PROCEDURE:  Excision of mass of back (2.0 cm), taken with a 2-mm margin  and with intermediate repair of 4.0 cm length. SURGEON:  Shakeel Terry MD    ANESTHESIA:  Local 1% Xylocaine with epinephrine, buffered. INDICATIONS:  The patient is a 59-year-old male with a mass of the back  previously infected, probably cyst, here for removal and diagnosis. The  procedure, the risks, and the benefits were discussed with him. All  questions were answered to his satisfaction. Consent was signed. NARRATIVE SUMMARY:  The patient was brought to the operating suite,  placed in the lateral position. He was prepped and draped in usual  sterile fashion. Initially, using a marker, outline was made for the  excision along with margin. Local anesthetic was infiltrated to the  area. Next, with a scalpel, incision was carried out around the  perimeter of the margin and taken through the full-thickness of skin. Then, with sharp and blunt scissor dissection, the underlying mass was  carefully dissected from its surrounding normal tissues and sent off as  specimen. Bovie cautery was used for hemostasis. Wound was closed in  layers with interrupted 4-0 Vicryl in the subcutaneous and running  intracuticular 4-0 Prolene in the skin. Steri-Strips for dressing. The  patient tolerated are procedure well. Blood loss, minimal.  Specimens  x1. Complications, none.   The patient was transferred to Recovery in  stable condition.         Nydia Stewart    D: 09/29/2021 22:25:02       T: 09/29/2021 22:27:17     [id number]  Job#: [id number]     Doc#: [id number]    CC:

## 2021-10-01 LAB — DERMATOLOGY PATHOLOGY REPORT: NORMAL

## 2021-12-02 RX ORDER — LOVASTATIN 20 MG/1
TABLET ORAL
Qty: 90 TABLET | Refills: 3 | Status: SHIPPED | OUTPATIENT
Start: 2021-12-02

## 2021-12-06 ENCOUNTER — HOSPITAL ENCOUNTER (OUTPATIENT)
Dept: GENERAL RADIOLOGY | Age: 66
Discharge: HOME | End: 2021-12-08
Payer: COMMERCIAL

## 2021-12-06 ENCOUNTER — HOSPITAL ENCOUNTER (OUTPATIENT)
Age: 66
Discharge: HOME | End: 2021-12-08
Payer: COMMERCIAL

## 2021-12-06 DIAGNOSIS — N20.0 KIDNEY STONES: ICD-10-CM

## 2021-12-06 PROCEDURE — 74018 RADEX ABDOMEN 1 VIEW: CPT

## 2021-12-13 ENCOUNTER — OFFICE VISIT (OUTPATIENT)
Dept: UROLOGY | Age: 66
End: 2021-12-13
Payer: COMMERCIAL

## 2021-12-13 VITALS
DIASTOLIC BLOOD PRESSURE: 77 MMHG | HEART RATE: 87 BPM | TEMPERATURE: 96.7 F | HEIGHT: 72 IN | SYSTOLIC BLOOD PRESSURE: 136 MMHG | BODY MASS INDEX: 30.34 KG/M2 | WEIGHT: 224 LBS

## 2021-12-13 DIAGNOSIS — R39.15 URGENCY OF URINATION: ICD-10-CM

## 2021-12-13 DIAGNOSIS — Z12.5 PROSTATE CANCER SCREENING: ICD-10-CM

## 2021-12-13 DIAGNOSIS — R35.0 URINARY FREQUENCY: ICD-10-CM

## 2021-12-13 DIAGNOSIS — N20.0 KIDNEY STONES: Primary | ICD-10-CM

## 2021-12-13 PROCEDURE — 99214 OFFICE O/P EST MOD 30 MIN: CPT | Performed by: UROLOGY

## 2021-12-13 ASSESSMENT — ENCOUNTER SYMPTOMS
CONSTIPATION: 0
DIARRHEA: 0
EYES NEGATIVE: 1
GASTROINTESTINAL NEGATIVE: 1
RESPIRATORY NEGATIVE: 1
ABDOMINAL PAIN: 0
EYE REDNESS: 0
COUGH: 0
BACK PAIN: 0
EYE PAIN: 0
SHORTNESS OF BREATH: 0
NAUSEA: 0
WHEEZING: 0
VOMITING: 0

## 2021-12-13 NOTE — PROGRESS NOTES
[address] [address]  Dept: [address] [facility] Urology Office Note - Established    Patient:  Annette Nolan  YOB: 1955  Date: 12/13/2021    The patient is a 77 y.o. male who presents todayfor evaluation of the following problems:   Chief Complaint   Patient presents with    6 Month Follow-Up     6 months, KUB       HPI  Talia De Luna is a very pleasant 79-year-old gentleman who has a history of stones. We had referred him to nephrology but he has not as of yet seen them. His KUB x-ray shows stable left nephrolithiasis. He has not had a PSA in a few years. He does have some frequency and urgency with his urinary symptoms are stable. Summary of old records: N/A    Additional History: N/A    Procedures Today: N/A    Urinalysis today:  No results found for this visit on 12/13/21.   Last several PSA's:  Lab Results   Component Value Date    PSA 0.18 04/05/2018    PSA 0.17 12/28/2017    PSA 0.20 08/07/2015     Last total testosterone:  No results found for: TESTOSTERONE    AUA Symptom Score (12/13/2021):                               Last BUN and creatinine:  Lab Results   Component Value Date    BUN 21 03/01/2021     Lab Results   Component Value Date    CREATININE 1.00 03/01/2021       Additional Lab/Culture results: none    Imaging Reviewed during this Office Visit: none  (results were independently reviewed by physician and radiology report verified)    PAST MEDICAL, FAMILY AND SOCIAL HISTORY UPDATE:  Past Medical History:   Diagnosis Date    Asthma     Dr. Dion White. 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 Bella Rolon PCP    Hypertension     managed by Dr. Bella Rolon PCP. 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 Salazar Zuñiga Endocrinologist    Ureteral stent retained     in place x 2    Wears glasses      Past Surgical History:   Procedure Laterality Date    BLADDER TUMOR EXCISION  2009    benign    BRONCHOSCOPY      COLONOSCOPY  09/08/2015    2 times with polypectomy- Dr. Trice Larry Right 06/17/2020    HOLMIUM LASER, CYSTOSCOPY, URETEROSCOPY, STENT PLACEMENT performed by Daryle Poet, MD at [address] [facility] Left 09/21/2015    2 stents in Lt.  CYSTOSCOPY  09/30/2015    EYE SURGERY      cataract surgery both eyes    HERNIA REPAIR Left     inguinal    KNEE ARTHROSCOPY      LITHOTRIPSY Left     x2    LITHOTRIPSY  09/30/2015    laser    LITHOTRIPSY Left 03/05/2021    LITHOTRIPSY Left 3/5/2021    ESWL EXTRACORPOREAL SHOCK WAVE LITHOTRIPSY  (NEX-MED CONF# [id number] - DAISY) performed by Daryle Poet, MD at [address] [place] [address] Right 05/23/2011    right lower lobe \"lung carcinoid\" removal. Dr. Mayela Batista ESWL Left 01/10/2018    ESWL [address] LITHOTRIPSY, POSSIBLE  STENT PLACEMENT (NEX MED CONF# [id number]) performed by Daryle Poet, MD at [address] [facility] / [facility] Left 06/09/2014    Excision lesion of nose. Dr. Benito Self.     SHOULDER SURGERY Right     closed manipulation    SHOULDER SURGERY Left     open manipulation    SKIN BIOPSY  11/12/2007    meir acilla and neck--squamous papillomas--lft buttock--subcutaneous abscess, back--compound nevus, lft neck--lentigo simplex, lft chest --junctional nevus, rt forearm--blue nevus    SKIN BIOPSY  12/09/2009    lft thigh--follicular cyst    SKIN BIOPSY  02/10/2010    rt thigh--ruptured epidermal inclusion cyst    SKIN BIOPSY  09/29/2021     BACK LESION BIOPSY EXCISION (N/A Back    SKIN BIOPSY N/A 9/29/2021    BACK LESION BIOPSY EXCISION performed by Black Wynn MD at [address]      as a child    URETER STENT PLACEMENT Left 09/30/2015    tiems 2 stents     Family History   Problem Relation Age of Onset    Heart Disease Mother     Diabetes Mother     Heart Disease Father     Diabetes Sister     Diabetes Maternal Grandmother     Diabetes Brother      Outpatient Medications Marked as Taking for the 12/13/21 encounter (Office Visit) with Cal Carlton MD   Medication Sig Dispense Refill    lovastatin (MEVACOR) 20 MG tablet TAKE 1 TABLET NIGHTLY 90 tablet 3    cephALEXin (KEFLEX) 250 MG capsule Take one PO QID til gone. Start one day prior to surgery / procedure.  12 capsule 0    fenofibrate (TRICOR) 145 MG tablet TAKE 1 TABLET DAILY 90 tablet 3    montelukast (SINGULAIR) 10 MG tablet       B-D UF III MINI PEN NEEDLES 31G X 5 MM MISC INJECT 1 PEN NEEDLE INTO   THE SKIN DAILY 100 each 3    losartan (COZAAR) 50 MG tablet TAKE 1 TABLET DAILY 90 tablet 3    VASCEPA 1 g CAPS capsule TAKE 2 CAPSULES DAILY 180 capsule 3    ibuprofen (ADVIL;MOTRIN) 600 MG tablet Take 1 tablet by mouth every 6 hours as needed for Pain 30 tablet 0    FARXIGA 5 MG tablet Take 10 mg by mouth every morning       glimepiride (AMARYL) 4 MG tablet Take 4 mg by mouth 2 times daily       BASAGLAR KWIKPEN 100 UNIT/ML injection pen Inject 60 Units into the skin daily       aspirin 81 MG chewable tablet Take 1 tablet by mouth daily Hold for 5 days after surgery, until 1/16/18 (Patient taking differently: Take 81 mg by mouth daily ) 30 tablet 0    VENTOLIN  (90 BASE) MCG/ACT inhaler inhale 2 puff every 4 hours prn  0    JANUMET XR  MG TB24 tablet Take 2 tablets by mouth daily         Seasonal  Social History     Tobacco Use   Smoking Status Never Smoker   Smokeless Tobacco Never Used     (Ifpatient a smoker, smoking cessation counseling offered)    Social History     Substance and Sexual Activity   Alcohol Use Yes    Comment: once a week       REVIEW OF SYSTEMS:  Review of Systems    Physical Exam:      Vitals:    12/13/21 1514   BP: 136/77   Pulse: 87   Temp: 96.7 °F (35.9 °C)     Body mass index is 30.38 kg/m². Patient is a 77 y.o. male in no acute distress and alert and oriented to person, place and time. Physical Exam  Constitutional: Patient in no acute distress. Neuro: Alert and oriented to person, place and time. Psych: Mood normal, affect normal  Skin: No rash noted  HEENT: Head: Normocephalic andatraumatic  Conjunctivae and EOM are normal. Pupils are equal, round      Assessment and Plan      1. Kidney stones    2. Urgency of urination    3. Urinary frequency    4. Prostate cancer screening           Plan:   F/u one year kub and psa      Return in about 1 year (around 12/13/2022) for kub and psa. Prescriptions Ordered:  No orders of the defined types were placed in this encounter. Orders Placed:  Orders Placed This Encounter   Procedures    XR ABDOMEN (KUB) (SINGLE AP VIEW)     Standing Status:   Future     Standing Expiration Date:   6/13/2023     Order Specific Question:   Reason for exam:     Answer:   kidney stone    PSA, Diagnostic     Standing Status:   Future     Standing Expiration Date:   12/13/2022           Nicole Lopez MD    Agree with the ROS entered by the MA.

## 2021-12-17 RX ORDER — ICOSAPENT ETHYL 1000 MG/1
CAPSULE ORAL
Qty: 180 CAPSULE | Refills: 3 | Status: SHIPPED | OUTPATIENT
Start: 2021-12-17 | End: 2022-08-08 | Stop reason: SDUPTHER

## 2021-12-17 RX ORDER — MONTELUKAST SODIUM 10 MG/1
TABLET ORAL
Qty: 90 TABLET | Refills: 3 | Status: SHIPPED | OUTPATIENT
Start: 2021-12-17 | End: 2022-09-13 | Stop reason: ALTCHOICE

## 2021-12-17 RX ORDER — LOSARTAN POTASSIUM 50 MG/1
TABLET ORAL
Qty: 90 TABLET | Refills: 3 | Status: SHIPPED | OUTPATIENT
Start: 2021-12-17

## 2022-03-17 LAB
AVERAGE GLUCOSE: NORMAL
HBA1C MFR BLD: 6.4 %

## 2022-04-04 ENCOUNTER — HOSPITAL ENCOUNTER (OUTPATIENT)
Age: 67
Discharge: HOME | End: 2022-04-04
Payer: MEDICARE

## 2022-04-04 LAB
ALBUMIN SERPL-MCNC: 4.9 G/DL (ref 3.5–5.2)
ALP BLD-CCNC: 46 U/L (ref 40–129)
ALT SERPL-CCNC: 31 U/L (ref 5–41)
ANION GAP SERPL CALCULATED.3IONS-SCNC: 12 MMOL/L (ref 9–17)
AST SERPL-CCNC: 22 U/L
BILIRUB SERPL-MCNC: 0.63 MG/DL (ref 0.3–1.2)
BUN BLDV-MCNC: 18 MG/DL (ref 8–23)
CALCIUM SERPL-MCNC: 10.1 MG/DL (ref 8.6–10.4)
CHLORIDE BLD-SCNC: 105 MMOL/L (ref 98–107)
CHOLESTEROL/HDL RATIO: 5.1
CHOLESTEROL: 138 MG/DL
CO2: 25 MMOL/L (ref 20–31)
CREAT SERPL-MCNC: 1 MG/DL (ref 0.7–1.2)
CREATININE URINE: 82.7 MG/DL (ref 39–259)
GFR AFRICAN AMERICAN: >60 ML/MIN
GFR NON-AFRICAN AMERICAN: >60 ML/MIN
GFR SERPL CREATININE-BSD FRML MDRD: ABNORMAL ML/MIN/{1.73_M2}
GLUCOSE BLD-MCNC: 141 MG/DL (ref 70–99)
HDLC SERPL-MCNC: 27 MG/DL
LDL CHOLESTEROL: 48 MG/DL (ref 0–130)
MICROALBUMIN/CREAT 24H UR: 32 MG/L
MICROALBUMIN/CREAT UR-RTO: 39 MCG/MG CREAT
POTASSIUM SERPL-SCNC: 4.5 MMOL/L (ref 3.7–5.3)
SODIUM BLD-SCNC: 142 MMOL/L (ref 135–144)
T3 FREE: 2.99 PG/ML (ref 2.02–4.43)
THYROXINE, FREE: 1.26 NG/DL (ref 0.93–1.7)
TOTAL CK: 22 U/L (ref 39–308)
TOTAL PROTEIN: 8 G/DL (ref 6.4–8.3)
TRIGL SERPL-MCNC: 313 MG/DL
TSH SERPL DL<=0.05 MIU/L-ACNC: 2.45 UIU/ML (ref 0.3–5)

## 2022-04-04 PROCEDURE — 80061 LIPID PANEL: CPT

## 2022-04-04 PROCEDURE — 36415 COLL VENOUS BLD VENIPUNCTURE: CPT

## 2022-04-04 PROCEDURE — 84481 FREE ASSAY (FT-3): CPT

## 2022-04-04 PROCEDURE — 82570 ASSAY OF URINE CREATININE: CPT

## 2022-04-04 PROCEDURE — 84443 ASSAY THYROID STIM HORMONE: CPT

## 2022-04-04 PROCEDURE — 82550 ASSAY OF CK (CPK): CPT

## 2022-04-04 PROCEDURE — 84439 ASSAY OF FREE THYROXINE: CPT

## 2022-04-04 PROCEDURE — 80053 COMPREHEN METABOLIC PANEL: CPT

## 2022-04-04 PROCEDURE — 82043 UR ALBUMIN QUANTITATIVE: CPT

## 2022-04-22 ENCOUNTER — TELEPHONE (OUTPATIENT)
Dept: PRIMARY CARE CLINIC | Age: 67
End: 2022-04-22

## 2022-04-22 ENCOUNTER — OFFICE VISIT (OUTPATIENT)
Dept: PRIMARY CARE CLINIC | Age: 67
End: 2022-04-22
Payer: MEDICARE

## 2022-04-22 VITALS
HEART RATE: 62 BPM | WEIGHT: 227 LBS | BODY MASS INDEX: 30.75 KG/M2 | SYSTOLIC BLOOD PRESSURE: 132 MMHG | DIASTOLIC BLOOD PRESSURE: 70 MMHG | OXYGEN SATURATION: 97 % | HEIGHT: 72 IN

## 2022-04-22 DIAGNOSIS — R19.7 DIARRHEA, UNSPECIFIED TYPE: ICD-10-CM

## 2022-04-22 DIAGNOSIS — Z79.4 TYPE 2 DIABETES MELLITUS WITHOUT COMPLICATION, WITH LONG-TERM CURRENT USE OF INSULIN (HCC): ICD-10-CM

## 2022-04-22 DIAGNOSIS — E11.9 TYPE 2 DIABETES MELLITUS WITHOUT COMPLICATION, WITH LONG-TERM CURRENT USE OF INSULIN (HCC): ICD-10-CM

## 2022-04-22 DIAGNOSIS — Z00.00 INITIAL MEDICARE ANNUAL WELLNESS VISIT: Primary | ICD-10-CM

## 2022-04-22 PROCEDURE — 3044F HG A1C LEVEL LT 7.0%: CPT | Performed by: FAMILY MEDICINE

## 2022-04-22 PROCEDURE — 3017F COLORECTAL CA SCREEN DOC REV: CPT | Performed by: FAMILY MEDICINE

## 2022-04-22 PROCEDURE — G0402 INITIAL PREVENTIVE EXAM: HCPCS | Performed by: FAMILY MEDICINE

## 2022-04-22 PROCEDURE — 1123F ACP DISCUSS/DSCN MKR DOCD: CPT | Performed by: FAMILY MEDICINE

## 2022-04-22 PROCEDURE — 4040F PNEUMOC VAC/ADMIN/RCVD: CPT | Performed by: FAMILY MEDICINE

## 2022-04-22 SDOH — ECONOMIC STABILITY: FOOD INSECURITY: WITHIN THE PAST 12 MONTHS, YOU WORRIED THAT YOUR FOOD WOULD RUN OUT BEFORE YOU GOT MONEY TO BUY MORE.: NEVER TRUE

## 2022-04-22 SDOH — ECONOMIC STABILITY: FOOD INSECURITY: WITHIN THE PAST 12 MONTHS, THE FOOD YOU BOUGHT JUST DIDN'T LAST AND YOU DIDN'T HAVE MONEY TO GET MORE.: NEVER TRUE

## 2022-04-22 ASSESSMENT — LIFESTYLE VARIABLES
HOW OFTEN DO YOU HAVE A DRINK CONTAINING ALCOHOL: 2-4 TIMES A MONTH
HOW MANY STANDARD DRINKS CONTAINING ALCOHOL DO YOU HAVE ON A TYPICAL DAY: 1 OR 2

## 2022-04-22 ASSESSMENT — PATIENT HEALTH QUESTIONNAIRE - PHQ9
SUM OF ALL RESPONSES TO PHQ QUESTIONS 1-9: 0
SUM OF ALL RESPONSES TO PHQ QUESTIONS 1-9: 0
SUM OF ALL RESPONSES TO PHQ9 QUESTIONS 1 & 2: 0
1. LITTLE INTEREST OR PLEASURE IN DOING THINGS: 0
2. FEELING DOWN, DEPRESSED OR HOPELESS: 0
SUM OF ALL RESPONSES TO PHQ QUESTIONS 1-9: 0
SUM OF ALL RESPONSES TO PHQ QUESTIONS 1-9: 0

## 2022-04-22 ASSESSMENT — SOCIAL DETERMINANTS OF HEALTH (SDOH): HOW HARD IS IT FOR YOU TO PAY FOR THE VERY BASICS LIKE FOOD, HOUSING, MEDICAL CARE, AND HEATING?: NOT HARD AT ALL

## 2022-04-22 NOTE — PATIENT INSTRUCTIONS
Personalized Preventive Plan for Carlotta Reynolds - 4/22/2022  Medicare offers a range of preventive health benefits. Some of the tests and screenings are paid in full while other may be subject to a deductible, co-insurance, and/or copay. Some of these benefits include a comprehensive review of your medical history including lifestyle, illnesses that may run in your family, and various assessments and screenings as appropriate. After reviewing your medical record and screening and assessments performed today your provider may have ordered immunizations, labs, imaging, and/or referrals for you. A list of these orders (if applicable) as well as your Preventive Care list are included within your After Visit Summary for your review. Other Preventive Recommendations:    · A preventive eye exam performed by an eye specialist is recommended every 1-2 years to screen for glaucoma; cataracts, macular degeneration, and other eye disorders. · A preventive dental visit is recommended every 6 months. · Try to get at least 150 minutes of exercise per week or 10,000 steps per day on a pedometer . · Order or download the FREE \"Exercise & Physical Activity: Your Everyday Guide\" from [employer]. Call 4-167.377.4103 or search [employer] online. · You need 2115-8107 mg of calcium and 9054-7387 IU of vitamin D per day. It is possible to meet your calcium requirement with diet alone, but a vitamin D supplement is usually necessary to meet this goal.  · When exposed to the sun, use a sunscreen that protects against both UVA and UVB radiation with an SPF of 30 or greater. Reapply every 2 to 3 hours or after sweating, drying off with a towel, or swimming. Always wear a seat belt when traveling in a car. Always wear a helmet when riding a bicycle or motorcycle. Patient Education        A Healthy Lifestyle: Care Instructions  Your Care Instructions     A healthy lifestyle can help you feel good, stay at a healthy weight, and have plenty of energy for both work and play. A healthy lifestyle is something youcan share with your whole family. A healthy lifestyle also can lower your risk for serious health problems, suchas high blood pressure, heart disease, and diabetes. You can follow a few steps listed below to improve your health and the healthof your family. Follow-up care is a key part of your treatment and safety. Be sure to make and go to all appointments, and call your doctor if you are having problems. It's also a good idea to know your test results and keep alist of the medicines you take. How can you care for yourself at home? Do not eat too much sugar, fat, or fast foods. You can still have dessert and treats now and then. The goal is moderation. Start small to improve your eating habits. Pay attention to portion sizes, drink less juice and soda pop, and eat more fruits and vegetables. Eat a healthy amount of food. A 3-ounce serving of meat, for example, is about the size of a deck of cards. Fill the rest of your plate with vegetables and whole grains. Limit the amount of soda and sports drinks you have every day. Drink more water when you are thirsty. Eat plenty of fruits and vegetables every day. Have an apple or some carrot sticks as an afternoon snack instead of a candy bar. Try to have fruits and/or vegetables at every meal.  Make exercise part of your daily routine. You may want to start with simple activities, such as walking, bicycling, or slow swimming. Try to be active 30 to 60 minutes every day. You do not need to do all 30 to 60 minutes all at once. For example, you can exercise 3 times a day for 10 or 20 minutes. Moderate exercise is safe for most people, but it is always a good idea to talk to your doctor before starting an exercise program.  Keep moving. Rolo Ramp the lawn, work in the garden, or [employer]. Take the stairs instead of the elevator at work.   If you smoke, quit. People who smoke have an increased risk for heart attack, stroke, cancer, and other lung illnesses. Quitting is hard, but there are ways to boost your chance of quitting tobacco for good. Use nicotine gum, patches, or lozenges. Ask your doctor about stop-smoking programs and medicines. Keep trying. In addition to reducing your risk of diseases in the future, you will notice some benefits soon after you stop using tobacco. If you have shortness of breath or asthma symptoms, they will likely get better within a few weeks after you quit. Limit how much alcohol you drink. Moderate amounts of alcohol (up to 2 drinks a day for men, 1 drink a day for women) are okay. But drinking too much can lead to liver problems, high blood pressure, and other health problems. Family health  If you have a family, there are many things you can do together to improve yourhealth. Eat meals together as a family as often as possible. Eat healthy foods. This includes fruits, vegetables, lean meats and dairy, and whole grains. Include your family in your fitness plan. Most people think of activities such as jogging or tennis as the way to fitness, but there are many ways you and your family can be more active. Anything that makes you breathe hard and gets your heart pumping is exercise. Here are some tips:  Walk to do errands or to take your child to school or the bus. Go for a family bike ride after dinner instead of watching TV. Where can you learn more? Go to https://ARTtwo50leoncioVadio.healthCrowd Science. org and sign in to your Grockit account. Enter Z984 in the [facility] box to learn more about \"A Healthy Lifestyle: Care Instructions. \"     If you do not have an account, please click on the \"Sign Up Now\" link. Current as of: June 16, 2021               Content Version: 13.2  © 2006-2022 Healthwise, Incorporated. Care instructions adapted under license by [facility] ([facility]).  If you have questions about a medical health. It is easy to get weighed down by worry and stress. Learn strategies to manage stress, like deep breathing and mindfulness, and stay connected with your family and community. If you find you often feel sad or hopeless, talk with your doctor. Treatment can help. Talk to your doctor about whether you have any risk factors for sexually transmitted infections (STIs). You can help prevent STIs if you wait to have sex with a new partner (or partners) until you've each been tested for STIs. It also helps if you use condoms (male or female condoms) and if you limit your sex partners to one person who only has sex with you. Vaccines are available for some STIs. If you think you may have a problem with alcohol or drug use, talk to your doctor. This includes prescription medicines (such as amphetamines and opioids) and illegal drugs (such as cocaine and methamphetamine). Your doctor can help you figure out what type of treatment is best for you. Protect your skin from too much sun. When you're outdoors from 10 a.m. to 4 p.m., stay in the shade or cover up with clothing and a hat with a wide brim. Wear sunglasses that block UV rays. Even when it's cloudy, put broad-spectrum sunscreen (SPF 30 or higher) on any exposed skin. See a dentist one or two times a year for checkups and to have your teeth cleaned. Wear a seat belt in the car. When should you call for help? Watch closely for changes in your health, and be sure to contact your doctor if you have any problems or symptoms that concern you. Where can you learn more? Go to https://herminia.healthGenius Digital. org and sign in to your DropThought account. Enter E499 in the [facility] box to learn more about \"Well Visit, Over 65: Care Instructions. \"     If you do not have an account, please click on the \"Sign Up Now\" link. Current as of: October 6, 2021               Content Version: 13.2  © 5021-7373 Healthwise, Incorporated.    Care instructions adapted under license by [facility] ([facility]). If you have questions about a medical condition or this instruction, always ask your healthcare professional. [address] any warranty or liability for your use of this information. Patient Education        Learning About Living Perroy  What is a living will? A living will, also called a declaration, is a legal form. It tells your family and your doctor your wishes when you can't speak for yourself. It's used by the health professionals who will treat you as you near the end of your life or ifyou get seriously hurt or ill. If you put your wishes in writing, your loved ones and others will know what kind of care you want. They won't need to guess. This can ease your mind and behelpful to others. And you can change or cancel your living will at any time. A living will is not the same as an estate or property will. An estate willexplains what you want to happen with your money and property after you die. How do you use it? Keep these facts in mind about how a living will is used. Your living will is used only if you can't speak or make decisions for yourself. Most often, one or more doctors must certify that you can't speak or decide for yourself before your living will takes effect. If you get better and can speak for yourself again, you can accept or refuse any treatment. It doesn't matter what you said in your living will. Some states may limit your right to refuse treatment in certain cases. For example, you may need to clearly state in your living will that you don't want artificial hydration and nutrition, such as being fed through a tube. Is a living will a legal document? A living will is a legal document. Each state has its own laws about livingwills. And a living will may be called something else in your state. Here are some things to know about living collier. You don't need an  to complete a living will.  But legal advice can be helpful if your state's laws are unclear. It can also help if your health history is complicated or your family can't agree on what should be in your living will. You can change your living will at any time. Some people find that their wishes about end-of-life care change as their health changes. If you make big changes to your living will, complete a new form. If you move to another state, make sure that your living will is legal in the state where you now live. In most cases, doctors will respect your wishes even if you have a form from a different state. You might use a universal form that has been approved by many states. This kind of form can sometimes be filled out and stored online. Your digital copy will then be available wherever you have a connection to the internet. The doctors and nurses who need to treat you can find it right away. Your state may offer an online registry. This is another place where you can store your living will online. It's a good idea to get your living will notarized. This means using a person called a [employer] to watch two people sign, or witness, your living will. What should you know when you create a living will? Here are some questions to ask yourself as you make your living will. Do you know enough about life support methods that might be used? If not, talk to your doctor so you know what might be done if you can't breathe on your own, your heart stops, or you can't swallow. What things would you still want to be able to do after you receive life-support methods? Would you want to be able to walk? To speak? To eat on your own? To live without the help of machines? Do you want certain Hinduism practices performed if you become very ill? If you have a choice, where do you want to be cared for? In your home? At a hospital or nursing home? If you have a choice at the end of your life, where would you prefer to die? At home?  In a hospital or nursing home? Somewhere else? Would you prefer to be buried or cremated? Do you want your organs to be donated after you die? What should you do with your living will? Make sure that your family members and your health care agent have copies of your living will (also called a declaration). Give your doctor a copy of your living will. Ask to have it kept as part of your medical record. If you have more than one doctor, make sure that each one has a copy. Put a copy of your living will where it can be easily found. For example, some people may put a copy on their refrigerator door. If you are using a digital copy, be sure your doctor, family members, and health care agent know how to find and access it. Where can you learn more? Go to https://Fashion & YoupeHomecare Homebasecorneliaeb.La Cartoonerie. org and sign in to your [employer] account. Enter A033 in the [email or web address] box to learn more about \"Learning About Living Perroy. \"     If you do not have an account, please click on the \"Sign Up Now\" link. Current as of: October 18, 2021               Content Version: 13.2  © 1946-2256 Healthwise, Incorporated. Care instructions adapted under license by [facility] ([facility]). If you have questions about a medical condition or this instruction, always ask your healthcare professional. [address] any warranty or liability for your use of this information.

## 2022-04-22 NOTE — TELEPHONE ENCOUNTER
LVM for Dr. Sumeet Camejo office stating that Dr. Roberta Velasco advised the pt to stay off metformin for a month and see if diarrhea and stomach issues are better.    If it's not better then check GB etc.

## 2022-04-22 NOTE — PROGRESS NOTES
Medicare Annual Wellness Visit    Willem Hernandez is here for Medicare AWV    Assessment & Plan   Initial Medicare annual wellness visit  Diarrhea, unspecified type  Type 2 diabetes mellitus without complication, with long-term current use of insulin ([facility] [address]  Assessment & Plan:   Monitored by specialist- no acute findings meriting change in the plan        Recommendations for Preventive Services Due: see orders and patient instructions/AVS.  Recommended screening schedule for the next 5-10 years is provided to the patient in written form: see Patient Instructions/AVS.     Return for Medicare Annual Wellness Visit in 1 year. Subjective   The following acute and/or chronic problems were also addressed today:  Diarrhea  Try off metformin for a month and see if diarrhea and stomach issues are better. If it's not better then check GB etc.     Patient's complete Health Risk Assessment and screening values have been reviewed and are found in Flowsheets. The following problems were reviewed today and where indicated follow up appointments were made and/or referrals ordered. Positive Risk Factor Screenings with Interventions:               General Health and ACP:  General  In general, how would you say your health is?: Good  In the past 7 days, have you experienced any of the following: New or Increased Pain, New or Increased Fatigue, Loneliness, Social Isolation, Stress or Anger?: No  Do you get the social and emotional support that you need?: Yes  Do you have a Living Will?: (!) No    Advance Directives     Power of  Living Will ACP-Advance Directive ACP-Power of     Not on File Not on File Not on File Not on File      General Health Risk Interventions:  First designate is spouse Jenny Myers, second is Paris Claudio: brother.      · No Living Will: INfo sheets given    Health Habits/Nutrition:     Physical Activity: Insufficiently Active    Days of Exercise per Week: 2 days    Minutes of Exercise per Session: 10 min     Have you lost any weight without trying in the past 3 months?: No  Body mass index: (!) 30.78  Have you seen the dentist within the past year?: Yes  discussed exercising more often and doing weights. Health Habits/Nutrition Interventions:  Discussed healthy diet. Sees DM specialist 6.4  Is on Farxiga but he pees a lot. It has helped his DM  · Inadequate physical activity:  try to exercise 10 min to 5 days a week. Objective   Vitals:    04/22/22 1337   BP: 132/70   Pulse: 62   SpO2: 97%   Weight: 227 lb (103 kg)   Height: 6' (1.829 m)      Body mass index is 30.79 kg/m². Gets diarrhea after lunch and then doesn't feel like eating dinner and snacks. Hx of IBS x years. Uses imodium to help with it and takes about 4 pills sometimes per episode. He states his wife says his memory is worse but he doesn't thinks so. No issues with balancing the checkbook. No issues with doing math. Remembers names. Not getting lost driving except for one episode out of town. Physical Exam  Vitals and nursing note reviewed. Constitutional:       General: He is not in acute distress. Appearance: He is well-developed. He is not ill-appearing. HENT:      Head: Normocephalic and atraumatic. Right Ear: External ear normal.      Left Ear: External ear normal.   Eyes:      General: No scleral icterus. Right eye: No discharge. Left eye: No discharge. Conjunctiva/sclera: Conjunctivae normal.      Pupils: Pupils are equal, round, and reactive to light. Neck:      Thyroid: No thyromegaly. Trachea: No tracheal deviation. Cardiovascular:      Rate and Rhythm: Normal rate and regular rhythm. Heart sounds: Normal heart sounds. Pulmonary:      Effort: Pulmonary effort is normal. No respiratory distress. Breath sounds: Normal breath sounds. No wheezing. Lymphadenopathy:      Cervical: No cervical adenopathy. Skin:     General: Skin is warm. Findings: No rash. Neurological:      Mental Status: He is alert and oriented to person, place, and time. Psychiatric:         Mood and Affect: Mood normal.         Behavior: Behavior normal.         Thought Content: Thought content normal.                 Allergies   Allergen Reactions    Seasonal      Prior to Visit Medications    Medication Sig Taking?  Authorizing Provider   VASCEPA 1 g CAPS capsule TAKE 2 CAPSULES DAILY Yes Cristopher Braden MD   losartan (COZAAR) 50 MG tablet TAKE 1 TABLET DAILY Yes Cristopher Braden MD   lovastatin (MEVACOR) 20 MG tablet TAKE 1 TABLET NIGHTLY Yes Cristopher Braden MD   fenofibrate (TRICOR) 145 MG tablet TAKE 1 TABLET DAILY Yes Cristopher Braden MD   B-D UF III MINI PEN NEEDLES 31G X 5 MM MISC INJECT 1 PEN NEEDLE INTO   THE SKIN DAILY Yes Cristopher Braden MD   ibuprofen (ADVIL;MOTRIN) 600 MG tablet Take 1 tablet by mouth every 6 hours as needed for Pain Yes Annamarie Render, DO   FARXIGA 5 MG tablet Take 10 mg by mouth every morning  Yes Historical Provider, MD   glimepiride (AMARYL) 4 MG tablet Take 4 mg by mouth 2 times daily  Yes Historical Provider, MD Diane Ch KWIKPEN 100 UNIT/ML injection pen Inject 60 Units into the skin daily  Yes Historical Provider, MD   aspirin 81 MG chewable tablet Take 1 tablet by mouth daily Hold for 5 days after surgery, until 1/16/18  Patient taking differently: Take 81 mg by mouth daily  Yes Jorge Ramirez MD   VENTOLIN  (90 BASE) MCG/ACT inhaler inhale 2 puff every 4 hours prn Yes Historical Provider, MD   JANHARMONY XR  MG TB24 tablet Take 2 tablets by mouth daily Yes Historical Provider, MD   montelukast (SINGULAIR) 10 MG tablet TAKE 1 TABLET NIGHTLY  Patient not taking: Reported on 4/22/2022  Cristopher Braden MD       [facility] (Including outside providers/suppliers regularly involved in providing care):   Patient Care Team:  Cristopher Braden MD as PCP - General (Family Medicine)  Cristopher Braden MD as PCP - REHABILITATION [facility][1] Provider    Reviewed and updated this visit:  Tobacco  Allergies  Meds  Problems  Med Hx  Surg Hx  Soc Hx  Fam Hx

## 2022-05-27 DIAGNOSIS — E11.9 TYPE 2 DIABETES MELLITUS WITHOUT COMPLICATION, WITH LONG-TERM CURRENT USE OF INSULIN (HCC): ICD-10-CM

## 2022-05-27 DIAGNOSIS — Z79.4 TYPE 2 DIABETES MELLITUS WITHOUT COMPLICATION, WITH LONG-TERM CURRENT USE OF INSULIN (HCC): ICD-10-CM

## 2022-05-27 RX ORDER — PEN NEEDLE, DIABETIC 31 GX5/16"
NEEDLE, DISPOSABLE MISCELLANEOUS
Qty: 90 EACH | Refills: 3 | Status: SHIPPED | OUTPATIENT
Start: 2022-05-27

## 2022-07-14 ENCOUNTER — HOSPITAL ENCOUNTER (OUTPATIENT)
Age: 67
Setting detail: SPECIMEN
Discharge: HOME | End: 2022-07-14

## 2022-07-14 DIAGNOSIS — R39.89 ABNORMAL URINE COLOR: ICD-10-CM

## 2022-07-14 DIAGNOSIS — R39.89 ABNORMAL URINE COLOR: Primary | ICD-10-CM

## 2022-07-14 LAB
-: ABNORMAL
BILIRUBIN URINE: NEGATIVE
COLOR: YELLOW
EPITHELIAL CELLS UA: ABNORMAL /HPF (ref 0–5)
GLUCOSE URINE: ABNORMAL
KETONES, URINE: NEGATIVE
LEUKOCYTE ESTERASE, URINE: NEGATIVE
NITRITE, URINE: NEGATIVE
PH UA: 5.5 (ref 5–8)
PROTEIN UA: NEGATIVE
RBC UA: ABNORMAL /HPF (ref 0–4)
SPECIFIC GRAVITY UA: 1.03 (ref 1–1.03)
TURBIDITY: CLEAR
URINE HGB: NEGATIVE
UROBILINOGEN, URINE: NORMAL
WBC UA: ABNORMAL /HPF (ref 0–5)

## 2022-07-18 ENCOUNTER — OFFICE VISIT (OUTPATIENT)
Dept: PRIMARY CARE CLINIC | Age: 67
End: 2022-07-18
Payer: MEDICARE

## 2022-07-18 VITALS
OXYGEN SATURATION: 97 % | HEART RATE: 76 BPM | HEIGHT: 72 IN | WEIGHT: 224.6 LBS | BODY MASS INDEX: 30.42 KG/M2 | SYSTOLIC BLOOD PRESSURE: 134 MMHG | DIASTOLIC BLOOD PRESSURE: 76 MMHG

## 2022-07-18 DIAGNOSIS — R10.32 LEFT INGUINAL PAIN: Primary | ICD-10-CM

## 2022-07-18 PROCEDURE — 99213 OFFICE O/P EST LOW 20 MIN: CPT | Performed by: FAMILY MEDICINE

## 2022-07-18 PROCEDURE — 1036F TOBACCO NON-USER: CPT | Performed by: FAMILY MEDICINE

## 2022-07-18 PROCEDURE — 3017F COLORECTAL CA SCREEN DOC REV: CPT | Performed by: FAMILY MEDICINE

## 2022-07-18 PROCEDURE — G8427 DOCREV CUR MEDS BY ELIG CLIN: HCPCS | Performed by: FAMILY MEDICINE

## 2022-07-18 PROCEDURE — 1123F ACP DISCUSS/DSCN MKR DOCD: CPT | Performed by: FAMILY MEDICINE

## 2022-07-18 PROCEDURE — G8417 CALC BMI ABV UP PARAM F/U: HCPCS | Performed by: FAMILY MEDICINE

## 2022-07-18 NOTE — PROGRESS NOTES
[facility]  [address]  [facility] [address]  Dept: [address]  is a 79 y.o. male Established patient, who presents today for his medical conditions/complaintsas noted below. Chief Complaint   Patient presents with    Hernia     Pt here today for poss lower lt abd hernia. HPI:     HPI  LLQ pain  No N/V  Lasts a few seconds and is gone, sharp pain off and on x 2 weeks  Sneezing fit can bring on the pain. Had one sharp pain in umbilical area 1 month ago when driving. Close to Area of previous Left hernia surgery with Mesh. Reviewed prior notes None  Reviewed previous Labs    LDL Cholesterol (mg/dL)   Date Value   04/04/2022 48   06/16/2021 08/22/2020 36       (goal LDL is <100)   AST (U/L)   Date Value   04/04/2022 22     ALT (U/L)   Date Value   04/04/2022 31     BUN (mg/dL)   Date Value   04/04/2022 18     Hemoglobin A1C (%)   Date Value   03/17/2022 6.4     TSH (uIU/mL)   Date Value   04/04/2022 2.45     BP Readings from Last 3 Encounters:   07/18/22 134/76   04/22/22 132/70   03/08/22 139/87          (goal 120/80)    Past Medical History:   Diagnosis Date    Asthma     Dr. Michell Fletcher. 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 Makeda Hood PCP    Hypertension     managed by Dr. Makeda Hood PCP.  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    lesion of left side of nose inclusion cyst    SKIN BIOPSY  09/29/2021     BACK LESION BIOPSY EXCISION (N/A Back    SKIN BIOPSY N/A 9/29/2021    BACK LESION BIOPSY EXCISION performed by Morenita Aguilar MD at [address] [place] [address]      as a child    URETER STENT PLACEMENT Left 09/30/2015    tiems 2 stents       Family History   Problem Relation Age of Onset    Heart Disease Mother     Diabetes Mother     Heart Disease Father     Diabetes Sister     Diabetes Maternal Grandmother     Diabetes Brother        Social History     Tobacco Use    Smoking status: Never    Smokeless tobacco: Never   Substance Use Topics    Alcohol use: Yes     Comment: once a week      Current Outpatient Medications   Medication Sig Dispense Refill    B-D UF III MINI PEN NEEDLES 31G X 5 MM MISC INJECT 1 PEN NEEDLE INTO   THE SKIN DAILY 90 each 3    losartan (COZAAR) 50 MG tablet TAKE 1 TABLET DAILY 90 tablet 3    lovastatin (MEVACOR) 20 MG tablet TAKE 1 TABLET NIGHTLY 90 tablet 3    fenofibrate (TRICOR) 145 MG tablet TAKE 1 TABLET DAILY 90 tablet 3    ibuprofen (ADVIL;MOTRIN) 600 MG tablet Take 1 tablet by mouth every 6 hours as needed for Pain 30 tablet 0    FARXIGA 5 MG tablet Take 10 mg by mouth every morning       glimepiride (AMARYL) 4 MG tablet Take 4 mg by mouth 2 times daily       BASAGLAR KWIKPEN 100 UNIT/ML injection pen Inject 60 Units into the skin daily       aspirin 81 MG chewable tablet Take 1 tablet by mouth daily Hold for 5 days after surgery, until 1/16/18 (Patient taking differently: Take 81 mg by mouth in the morning.) 30 tablet 0    VENTOLIN  (90 BASE) MCG/ACT inhaler inhale 2 puff every 4 hours prn  0    VASCEPA 1 g CAPS capsule TAKE 2 CAPSULES DAILY (Patient not taking: Reported on 7/18/2022) 180 capsule 3    montelukast (SINGULAIR) 10 MG tablet TAKE 1 TABLET NIGHTLY (Patient not taking: No sig reported) 90 tablet 3    JANUMET XR  MG TB24 tablet Take 2 tablets by mouth daily (Patient not taking: Reported on 7/18/2022) No current facility-administered medications for this visit. Allergies   Allergen Reactions    Seasonal        Health Maintenance   Topic Date Due    Hepatitis A vaccine (1 of 2 - Risk 2-dose series) Never done    Hepatitis C screen  Never done    Shingles vaccine (1 of 2) Never done    Prostate Specific Antigen (PSA) Screening or Monitoring  04/05/2019    COVID-19 Vaccine (4 - Booster) 04/30/2022    Diabetic foot exam  06/03/2022    Flu vaccine (1) 09/01/2022    A1C test (Diabetic or Prediabetic)  03/17/2023    Diabetic microalbuminuria test  04/04/2023    Diabetic retinal exam  04/04/2023    Lipids  04/04/2023    Depression Screen  04/22/2023    Annual Wellness Visit (AWV)  04/23/2023    Pneumococcal 65+ years Vaccine (3 - PPSV23 or PCV20) 12/01/2024    DTaP/Tdap/Td vaccine (2 - Td or Tdap) 08/21/2030    Colorectal Cancer Screen  12/30/2030    Hib vaccine  Aged Out    Meningococcal (ACWY) vaccine  Aged Out       Subjective:      Review of Systems    Objective:     /76   Pulse 76   Ht 6' (1.829 m)   Wt 224 lb 9.6 oz (101.9 kg)   SpO2 97%   BMI 30.46 kg/m²   Physical Exam  Vitals and nursing note reviewed. Exam conducted with a chaperone present. Constitutional:       Appearance: He is obese. HENT:      Head: Normocephalic and atraumatic. Abdominal:      General: Bowel sounds are normal. There is no distension. Tenderness: There is no abdominal tenderness. There is no guarding or rebound. Hernia: A hernia is present. Comments: Ventral hernia  Area of pain LLQ is slightly tender to palpation and no obvious hernia present. Left inguinal opening is tender to palpation, not tender on the right. No obvious hernia felt. Genitourinary:     Pubic Area: No rash. Comments: Medical Student Rosina Hernández was escort. Neurological:      Mental Status: He is alert and oriented to person, place, and time.    Psychiatric:         Mood and Affect: Mood normal. Behavior: Behavior normal.         Thought Content: Thought content normal.       Assessment:       Diagnosis Orders   1. Left inguinal pain  Christophe Augustine MD, Hernia and [facility] [address] [place] [address]           Plan:      No follow-ups on file. Referral to general surgeon. I did not feel a hernia but there could be one there. I do not think it's diverticulitis due to short episodes of pain. Orders Placed This Encounter   Procedures    Christophe Augustine MD, Hernia and [facility] [address] [place] [address]     Referral Priority:   Routine     Referral Type:   Eval and Treat     Referral Reason:   Specialty Services Required     Referred to Provider:   Aston Brewer MD     Requested Specialty:   General Surgery     Number of Visits Requested:   1     No orders of the defined types were placed in this encounter. Patient given educationalmaterials - see patient instructions. Discussed use, benefit, and side effectsof prescribed medications. All patient questions answered. Pt voiced understanding. Reviewed health maintenance. Instructed to continue current medications, diet andexercise. Patient agreed with treatment plan. Follow up as directed.      Electronicallysigned by Vadim Castellanos MD on 7/18/2022 at 5:18 PM

## 2022-07-27 ENCOUNTER — HOSPITAL ENCOUNTER (OUTPATIENT)
Dept: SURGERY | Age: 67
Discharge: HOME | End: 2022-07-27
Payer: MEDICARE

## 2022-07-27 VITALS
WEIGHT: 225 LBS | HEIGHT: 72 IN | DIASTOLIC BLOOD PRESSURE: 83 MMHG | BODY MASS INDEX: 30.48 KG/M2 | SYSTOLIC BLOOD PRESSURE: 160 MMHG

## 2022-07-27 DIAGNOSIS — R10.32 LEFT GROIN PAIN: Primary | ICD-10-CM

## 2022-07-27 DIAGNOSIS — R10.32 LEFT GROIN PAIN: ICD-10-CM

## 2022-07-27 PROCEDURE — 99203 OFFICE O/P NEW LOW 30 MIN: CPT | Performed by: SURGERY

## 2022-07-27 PROCEDURE — 99202 OFFICE O/P NEW SF 15 MIN: CPT

## 2022-07-27 NOTE — H&P
[facility] General Surgery   History & Physical  Demetrice Rodriguez DO  Pt Name: Tierney Collier  MRN: [id number]  YOB: 1955  Date of evaluation: 7/27/2022  Primary Care Physician: Fred Moncada MD    Chief Complaint: left groin pain      SUBJECTIVE:    History of Present Illness: This is a 79 y.o.  male who presents for evaluation for the above, his wife is also present. Pt reports prior [facility] repair with mesh by Dr. Elissa Grey, pt reports occasional left groin pain with sneezing and occasionally with driving. Chart review performed to add information to the HPI: Yes    Past Medical History   has a past medical history of Asthma, Benign bladder tumor, Bilateral hearing loss, BPH (benign prostatic hyperplasia), Cancer ([address] Carcinoid tumor of lung, Cataract, GERD (gastroesophageal reflux disease), H/O arthroscopy of left knee, H/O seasonal allergies, Hearing aid worn, Hematuria, Hx of lithotripsy, Hyperlipidemia, Hypertension, Kidney stone, Left ureteral stone, Neoplasm of unspecified nature of bone, soft tissue, and skin, Osteoarthritis, Snores, SOB (shortness of breath), Type II or unspecified type diabetes mellitus without mention of complication, not stated as uncontrolled, Ureteral stent retained, and Wears glasses. Past Surgical History   has a past surgical history that includes bladder tumor excision (2009); Lung surgery (Right, 05/23/2011); pre-malignant / benign skin lesion excision (Left, 06/09/2014); shoulder surgery (Right); shoulder surgery (Left); Cystoscopy; bronchoscopy; Lithotripsy (Left); Knee arthroscopy; Cystoscopy (Left, 09/21/2015); Cystoscopy (09/30/2015); Ureter stent placement (Left, 09/30/2015); Lithotripsy (09/30/2015); pr fragment kidney stone/ eswl (Left, 01/10/2018); cysto/uretero/pyeloscopy, calculus tx (Right, 06/17/2020); Colonoscopy (09/08/2015); Colonoscopy; hernia repair (Left); Tonsillectomy; eye surgery; Lithotripsy (Left, 03/05/2021);  Lithotripsy (Left, 3/5/2021); skin biopsy (11/12/2007); skin biopsy (12/09/2009); skin biopsy (02/10/2010); skin biopsy (09/29/2021); and skin biopsy (N/A, 9/29/2021). Family History  family history includes Diabetes in his brother, maternal grandmother, mother, and sister; Heart Disease in his father and mother. Social History  Tobacco use:  reports that he has never smoked. He has never used smokeless tobacco.  Alcohol use:  reports current alcohol use. Drug use:  reports no history of drug use. Medications  Current Medications:   Current Outpatient Medications   Medication Sig Dispense Refill    B-D UF III MINI PEN NEEDLES 31G X 5 MM MISC INJECT 1 PEN NEEDLE INTO   THE SKIN DAILY 90 each 3    VASCEPA 1 g CAPS capsule TAKE 2 CAPSULES DAILY (Patient not taking: Reported on 7/18/2022) 180 capsule 3    losartan (COZAAR) 50 MG tablet TAKE 1 TABLET DAILY 90 tablet 3    montelukast (SINGULAIR) 10 MG tablet TAKE 1 TABLET NIGHTLY (Patient not taking: No sig reported) 90 tablet 3    lovastatin (MEVACOR) 20 MG tablet TAKE 1 TABLET NIGHTLY 90 tablet 3    fenofibrate (TRICOR) 145 MG tablet TAKE 1 TABLET DAILY 90 tablet 3    ibuprofen (ADVIL;MOTRIN) 600 MG tablet Take 1 tablet by mouth every 6 hours as needed for Pain 30 tablet 0    FARXIGA 5 MG tablet Take 10 mg by mouth every morning       glimepiride (AMARYL) 4 MG tablet Take 4 mg by mouth 2 times daily       BASAGLAR KWIKPEN 100 UNIT/ML injection pen Inject 60 Units into the skin daily       aspirin 81 MG chewable tablet Take 1 tablet by mouth daily Hold for 5 days after surgery, until 1/16/18 (Patient taking differently: Take 81 mg by mouth in the morning.) 30 tablet 0    VENTOLIN  (90 BASE) MCG/ACT inhaler inhale 2 puff every 4 hours prn  0    JANUMET XR  MG TB24 tablet Take 2 tablets by mouth daily (Patient not taking: Reported on 7/18/2022)       No current facility-administered medications for this encounter.      Home Medications:   Prior to Admission medications Medication Sig Start Date End Date Taking? Authorizing Provider   B-D UF III MINI PEN NEEDLES 31G X 5 MM MISC INJECT 1 PEN NEEDLE INTO   THE SKIN DAILY 5/27/22   Dory Hurley MD   VASCEPA 1 g CAPS capsule TAKE 2 CAPSULES DAILY  Patient not taking: Reported on 7/18/2022 12/17/21   Dory Hurley MD   losartan (COZAAR) 50 MG tablet TAKE 1 TABLET DAILY 12/17/21   Dory Hurley MD   montelukast (SINGULAIR) 10 MG tablet TAKE 1 TABLET NIGHTLY  Patient not taking: No sig reported 12/17/21   Dory Hurley MD   lovastatin (MEVACOR) 20 MG tablet TAKE 1 TABLET NIGHTLY 12/2/21   Dory Hurley MD   fenofibrate (TRICOR) 145 MG tablet TAKE 1 TABLET DAILY 8/23/21   Dory Hurley MD   ibuprofen (ADVIL;MOTRIN) 600 MG tablet Take 1 tablet by mouth every 6 hours as needed for Pain 6/14/20   María Burgess DO   FARXIGA 5 MG tablet Take 10 mg by mouth every morning  4/21/19   Historical Provider, MD   glimepiride (AMARYL) 4 MG tablet Take 4 mg by mouth 2 times daily  4/27/19   Historical Provider, MD Anette Landin KWIKPEN 100 UNIT/ML injection pen Inject 60 Units into the skin daily  6/2/19   Historical Provider, MD   aspirin 81 MG chewable tablet Take 1 tablet by mouth daily Hold for 5 days after surgery, until 1/16/18  Patient taking differently: Take 81 mg by mouth in the morning. 1/10/18   Dev Holm MD   VENTOLIN  (90 BASE) MCG/ACT inhaler inhale 2 puff every 4 hours prn 6/24/16   Historical Provider, MD MILES XR  MG TB24 tablet Take 2 tablets by mouth daily  Patient not taking: Reported on 7/18/2022 8/21/15   Historical Provider, MD       Allergies  Seasonal      Review of Systems:  General: Denies any fever, chills.   Eyes: Denies any changes in vision, diplopia or eye pain  Ears, Nose, Mouth: Denies changes in hearing/tinnitus or drainage from ears, no rhinorrhea or bloody nose, no difficulty chewing  Throat: no difficulty swallowing, no throat pain  Respiratory: Denies any shortness of breath or cough. Cardiac: Denies any chest pain, palpitations, claudication or edema. Gastrointestinal: Denies any melena, hematochezia, hematemesis or pyrosis. Genitourinary: Denies any frequency, urgency, hesitancy or incontinence. Musculoskeletal: Denies worsening muscle weakness or recent trauma  Skin: Denies rashes or lesions  Psychiatric: Denies any recent changes in mood or affect  Hematologic: Denies bruising or bleeding easily. PHYSICAL EXAMINATION  Vitals:   Vitals:    07/27/22 1105   BP: (!) 160/83       General Appearance:  awake, alert, no acute distress, well developed, well nourished   Skin:  Skin color, texture, turgor normal. No rashes or lesions. Head/face:  NCAT, face symmetrical  Eyes:  PERRL, no evidence of conjunctivitis or ptosis bilaterally  Ears:  External ears and canals grossly normal, no evidence of otorrhea. Nose/Sinuses:  Nares normal. Septum midline. Mucosa normal. No external drainage noted. Mouth/Neck:  Mucosa moist.  No external oral lesions. Trachea midline. No visible masses. Lungs:  Normal chest expansion, unlabored breathing without accessory muscle use. No audible rales, rhonchi, or wheezing. Cardiovascular: S1S2. No evidence of JVD. No evidence of pulsatile masses in abdomen  Abdomen:  Soft, non-tender, no organomegaly, no masses. Musculoskeletal: No evidence of bony/muscular deformities, trauma, atrophy of either left/right upper/lower extremity. No evidence of digital clubbing or cyanosis. Neurologic:  CN 2-12 grossly intact without obvious deficits. Grossly normal sensation in all extremities.   Psychiatric: appropriate judgement and insight, appropriate recall of recent and remote memory, no evidence of depression/anxiety/agitation        DIAGNOSES:  Active Hospital Problems    Diagnosis Date Noted    Left groin pain [R10.32] 07/27/2022     Priority: Medium         PLAN:  Unclear etiology for L groin pain, may be due to mesh migration or recurrence of hernia. Will obtain CT abd/pelv to better characterize, further recommendations to follow results. All questions were answered, pt is agreeable to this plan.         Medical Decision Making: low complexity    Electronically signed by Keila Desai DO on 7/27/2022 at 11:20 AM

## 2022-07-27 NOTE — LETTER
[facility] [place] [facility]""  Surgical Specialties - General Surgery  [address]  [place], [address]  Phone: [phone]  Fax: [phone]      22    Patient: Diana Charles  MRN: [id number]  : 1955  Date of visit: 2022    Dear Andrew Wilson MD:      Thank you for requesting consultation for Diana Charles in regards to evaluation for left groin pain. Below are the relevant portions of my assessment and plan of care. If you have questions, please do not hesitate to call me. I look forward to following Diana Charles along with you.     Sincerely,        Ava Hoover, DO

## 2022-08-03 ENCOUNTER — TELEPHONE (OUTPATIENT)
Dept: GASTROENTEROLOGY | Age: 67
End: 2022-08-03

## 2022-08-03 ENCOUNTER — HOSPITAL ENCOUNTER (OUTPATIENT)
Dept: CT IMAGING | Age: 67
Discharge: HOME | End: 2022-08-05
Payer: MEDICARE

## 2022-08-03 DIAGNOSIS — R10.32 LEFT GROIN PAIN: ICD-10-CM

## 2022-08-03 LAB
GFR NON-AFRICAN AMERICAN: >60 ML/MIN
GFR SERPL CREATININE-BSD FRML MDRD: >60 ML/MIN
GFR SERPL CREATININE-BSD FRML MDRD: NORMAL ML/MIN/{1.73_M2}
POC CREATININE: 0.91 MG/DL (ref 0.51–1.19)

## 2022-08-03 PROCEDURE — 82565 ASSAY OF CREATININE: CPT

## 2022-08-03 PROCEDURE — 2580000003 HC RX 258: Performed by: SURGERY

## 2022-08-03 PROCEDURE — 6360000004 HC RX CONTRAST MEDICATION: Performed by: SURGERY

## 2022-08-03 PROCEDURE — 74177 CT ABD & PELVIS W/CONTRAST: CPT

## 2022-08-03 RX ORDER — 0.9 % SODIUM CHLORIDE 0.9 %
80 INTRAVENOUS SOLUTION INTRAVENOUS ONCE
Status: COMPLETED | OUTPATIENT
Start: 2022-08-03 | End: 2022-08-03

## 2022-08-03 RX ORDER — SODIUM CHLORIDE 0.9 % (FLUSH) 0.9 %
10 SYRINGE (ML) INJECTION PRN
Status: DISCONTINUED | OUTPATIENT
Start: 2022-08-03 | End: 2022-08-06 | Stop reason: HOSPADM

## 2022-08-03 RX ADMIN — SODIUM CHLORIDE 80 ML: 9 INJECTION, SOLUTION INTRAVENOUS at 08:36

## 2022-08-03 RX ADMIN — SODIUM CHLORIDE, PRESERVATIVE FREE 10 ML: 5 INJECTION INTRAVENOUS at 08:36

## 2022-08-03 RX ADMIN — IOPAMIDOL 75 ML: 755 INJECTION, SOLUTION INTRAVENOUS at 08:36

## 2022-08-04 DIAGNOSIS — R91.8 LUNG NODULE, MULTIPLE: Primary | ICD-10-CM

## 2022-08-08 ENCOUNTER — TELEPHONE (OUTPATIENT)
Dept: PRIMARY CARE CLINIC | Age: 67
End: 2022-08-08

## 2022-08-08 RX ORDER — FENOFIBRATE 145 MG/1
145 TABLET, COATED ORAL EVERY OTHER DAY
Qty: 90 TABLET | Refills: 3 | Status: SHIPPED | OUTPATIENT
Start: 2022-08-08 | End: 2022-09-02

## 2022-08-08 RX ORDER — ICOSAPENT ETHYL 1000 MG/1
2 CAPSULE ORAL DAILY
Qty: 180 CAPSULE | Refills: 3 | Status: SHIPPED | OUTPATIENT
Start: 2022-08-08 | End: 2022-09-13 | Stop reason: ALTCHOICE

## 2022-08-08 NOTE — TELEPHONE ENCOUNTER
Pt states he lost his fenofibrate and is asking if you can send in a new order for him to [employer] [facility]. Pt asking for vascepa as well to the [employer] mail service    Please advise.

## 2022-08-08 NOTE — TELEPHONE ENCOUNTER
Take fenofibrate every other day since he is also supposed to be mevacor  ( lovastatin) and those two shouldn't be taken together all the time. Take lovastatin daily still  Send in fenofibrate end vascepa in .

## 2022-08-13 ENCOUNTER — HOSPITAL ENCOUNTER (OUTPATIENT)
Dept: CT IMAGING | Age: 67
Discharge: HOME | End: 2022-08-15
Payer: MEDICARE

## 2022-08-13 VITALS — WEIGHT: 225 LBS | BODY MASS INDEX: 30.52 KG/M2

## 2022-08-13 DIAGNOSIS — R91.8 LUNG NODULE, MULTIPLE: ICD-10-CM

## 2022-08-13 PROCEDURE — 71250 CT THORAX DX C-: CPT

## 2022-08-16 DIAGNOSIS — R91.8 LUNG NODULES: Primary | ICD-10-CM

## 2022-08-17 ENCOUNTER — HOSPITAL ENCOUNTER (OUTPATIENT)
Dept: SURGERY | Age: 67
Discharge: HOME | End: 2022-08-17
Payer: MEDICARE

## 2022-08-17 VITALS
SYSTOLIC BLOOD PRESSURE: 130 MMHG | BODY MASS INDEX: 29.29 KG/M2 | HEIGHT: 73 IN | WEIGHT: 221 LBS | HEART RATE: 77 BPM | DIASTOLIC BLOOD PRESSURE: 65 MMHG

## 2022-08-17 DIAGNOSIS — K40.91 RECURRENT INGUINAL HERNIA OF LEFT SIDE WITHOUT OBSTRUCTION OR GANGRENE: ICD-10-CM

## 2022-08-17 DIAGNOSIS — K40.90 RIGHT INGUINAL HERNIA: ICD-10-CM

## 2022-08-17 PROBLEM — R10.32 LEFT GROIN PAIN: Status: RESOLVED | Noted: 2022-07-27 | Resolved: 2022-08-17

## 2022-08-17 PROCEDURE — 99214 OFFICE O/P EST MOD 30 MIN: CPT | Performed by: SURGERY

## 2022-08-17 PROCEDURE — 99202 OFFICE O/P NEW SF 15 MIN: CPT

## 2022-08-17 NOTE — PROGRESS NOTES
[facility] General Surgery   History & Physical  Samantha Boyer DO    PATIENT NAME: Trell Sharpe     CLINIC VISIT DATE: 8/17/2022    SUBJECTIVE:  Naya Castillo is a 79 y.o. male who presents to the clinic today for follow up to discussion regarding L inguinal pain, CT abd/pelv was obtained significant for bilateral inguinal hernia, PSH of prior [facility] repair with mesh. Pt would like to proceed with surgery. OBJECTIVE:    /65   Pulse 77   Ht 6' 1\" (1.854 m)   Wt 221 lb (100.2 kg)   BMI 29.16 kg/m²     General Appearance:  awake, alert, no acute distress, well developed, well nourished   Skin:  Skin color, texture, turgor normal. No rashes or lesions. Lungs:  Normal chest expansion, unlabored breathing without accessory muscle use. No audible rales, rhonchi, or wheezing. Cardiovascular: S1S2. No evidence of JVD. No evidence of pulsatile masses in abdomen  Abdomen:  Soft, non-tender, no organomegaly, no masses. Musculoskeletal: No evidence of bony/muscular deformities, trauma, atrophy of either left/right upper/lower extremity. No evidence of digital clubbing or cyanosis. ASSESSMENT:  1. Recurrent inguinal hernia of left side without obstruction or gangrene    2. Right inguinal hernia          PLAN:  The risks include bleeding, infection, scarring, nerve pain, risk of atrophy to testicles, risk of orchiectomy, damage to surrounding tissues, recurrence of hernia, need for further surgery in the future. The benefits, alternatives, complications and procedure details were explained to the patient, all questions were answered.   Plan robot bilateral ing hernia repair with mesh    Electronically signed by Samantha Boyer DO on 8/17/2022 at 9:36 AM

## 2022-08-17 NOTE — LETTER
[place]  [facility] General [facility]  [address]  [place], [address]  Phone: [phone]  Fax: [phone]      22    Patient: Clarisa Valenzuela  MRN: [id number]  : 1955  Date of visit: 2022    Dear Elvira Duke MD:      I saw Clarisa Valenzuela in follow up to CT findings of bilateral inguinal hernia repair, we will schedule him for surgery. Below are the relevant portions of my assessment and plan of care. If you have questions, please do not hesitate to call me. I look forward to following Clarisa Valenzuela along with you.     Sincerely,        Erma Gruber, DO

## 2022-08-22 ENCOUNTER — OFFICE VISIT (OUTPATIENT)
Dept: UROLOGY | Age: 67
End: 2022-08-22
Payer: MEDICARE

## 2022-08-22 VITALS
DIASTOLIC BLOOD PRESSURE: 72 MMHG | HEIGHT: 73 IN | TEMPERATURE: 97.5 F | WEIGHT: 221 LBS | OXYGEN SATURATION: 95 % | BODY MASS INDEX: 29.29 KG/M2 | SYSTOLIC BLOOD PRESSURE: 110 MMHG | HEART RATE: 77 BPM

## 2022-08-22 DIAGNOSIS — D30.12: ICD-10-CM

## 2022-08-22 DIAGNOSIS — N20.0 KIDNEY STONES: Primary | ICD-10-CM

## 2022-08-22 PROCEDURE — 1036F TOBACCO NON-USER: CPT | Performed by: UROLOGY

## 2022-08-22 PROCEDURE — G8417 CALC BMI ABV UP PARAM F/U: HCPCS | Performed by: UROLOGY

## 2022-08-22 PROCEDURE — 3017F COLORECTAL CA SCREEN DOC REV: CPT | Performed by: UROLOGY

## 2022-08-22 PROCEDURE — 1123F ACP DISCUSS/DSCN MKR DOCD: CPT | Performed by: UROLOGY

## 2022-08-22 PROCEDURE — G8427 DOCREV CUR MEDS BY ELIG CLIN: HCPCS | Performed by: UROLOGY

## 2022-08-22 PROCEDURE — 99214 OFFICE O/P EST MOD 30 MIN: CPT | Performed by: UROLOGY

## 2022-08-22 ASSESSMENT — ENCOUNTER SYMPTOMS
SHORTNESS OF BREATH: 0
COUGH: 0
NAUSEA: 0
WHEEZING: 0
VOMITING: 0
DIARRHEA: 0
SORE THROAT: 0

## 2022-08-22 NOTE — PROGRESS NOTES
Review of Systems   Constitutional:  Negative for chills, fatigue and fever. HENT:  Negative for congestion and sore throat. Respiratory:  Negative for cough, shortness of breath and wheezing. Cardiovascular:  Negative for chest pain and palpitations. Gastrointestinal:  Negative for diarrhea, nausea and vomiting. Genitourinary:  Positive for frequency and urgency. Negative for difficulty urinating, dysuria and hematuria.

## 2022-08-22 NOTE — PROGRESS NOTES
[address] [address]  Dept: [address] [facility] Urology Office Note - Established    Patient:  Charli Rojo  YOB: 1955  Date: 8/22/2022    The patient is a 79 y.o. male who presents todayfor evaluation of the following problems:   Chief Complaint   Patient presents with    Follow-up     Follow up with CT scan       HPI  This is a very pleasant 80-year-old gentleman who has hernias. He recently had a CT with contrast.  There is question of thickening of his left renal pelvis. He has no hematuria and no bothersome urinary symptoms presently. Summary of old records: N/A    Additional History: N/A    Procedures Today: N/A    Urinalysis today:  No results found for this visit on 08/22/22. Last several PSA's:  Lab Results   Component Value Date    PSA 0.18 04/05/2018    PSA 0.17 12/28/2017    PSA 0.20 08/07/2015     Last total testosterone:  No results found for: TESTOSTERONE    AUA Symptom Score (8/22/2022):                                Last BUN and creatinine:  Lab Results   Component Value Date    BUN 18 04/04/2022     Lab Results   Component Value Date    CREATININE 0.91 08/03/2022       Additional Lab/Culture results: none    Imaging Reviewed during this Office Visit: none  (results were independently reviewed by physician and radiology report verified)    PAST MEDICAL, FAMILY AND SOCIAL HISTORY UPDATE:  Past Medical History:   Diagnosis Date    Asthma     Dr. Hoa Amaya. 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 An Cotto PCP    Hypertension     managed by Dr. An Cotto PCP. 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 Therese Mortensen Endocrinologist    Ureteral stent retained     in place x 2    Wears glasses      Past Surgical History:   Procedure Laterality Date    BLADDER TUMOR EXCISION  2009    benign    BRONCHOSCOPY      COLONOSCOPY  09/08/2015    2 times with polypectomy- Dr. Debbie Townsend Right 06/17/2020    HOLMIUM LASER, CYSTOSCOPY, URETEROSCOPY, STENT PLACEMENT performed by Cal Carlton MD at [address] Left 09/21/2015    2 stents in [address]. CYSTOSCOPY  09/30/2015    EYE SURGERY      cataract surgery both eyes    HERNIA REPAIR Left     inguinal    KNEE ARTHROSCOPY      LITHOTRIPSY Left     x2    LITHOTRIPSY  09/30/2015    laser    LITHOTRIPSY Left 03/05/2021    LITHOTRIPSY Left 3/5/2021    ESWL EXTRACORPOREAL SHOCK WAVE LITHOTRIPSY  (NEX-MED CONF# [id number] - DAISY) performed by Cal Carlton MD at . [facility] Right 05/23/2011    right lower lobe \"lung carcinoid\" removal. Dr. Dee Foster ESWL Left 01/10/2018    ESWL EXTRACORPEAL SHOCK WAVE LITHOTRIPSY, POSSIBLE  STENT PLACEMENT (NEX MED CONF# [id number]) performed by Cal Carlton MD at [address] [facility] / [facility] Left 06/09/2014    Excision lesion of nose. Dr. Coe [facility].     SHOULDER SURGERY Right     closed manipulation    SHOULDER SURGERY Left     open manipulation    SKIN BIOPSY  11/12/2007    meir acilla and neck--squamous papillomas--lft buttock--subcutaneous abscess, back--compound nevus, lft neck--lentigo simplex, lft chest --junctional nevus, rt forearm--blue nevus    SKIN BIOPSY  12/09/2009    lft thigh--follicular cyst    SKIN BIOPSY  02/10/2010    rt thigh--ruptured epidermal inclusion cyst    SKIN BIOPSY  09/29/2021     BACK LESION BIOPSY EXCISION (N/A Back    SKIN BIOPSY N/A 9/29/2021    BACK LESION BIOPSY EXCISION performed by Cordell Castorena MD at [address] [place] [address]      as a child    URETER STENT PLACEMENT Left 09/30/2015    tiems 2 stents     Family History   Problem Relation Age of Onset    Heart Disease Mother     Diabetes Mother     Heart Disease Father     Diabetes Sister     Diabetes Maternal Grandmother     Diabetes Brother      Outpatient Medications Marked as Taking for the 8/22/22 encounter (Office Visit) with Henok Chavira MD   Medication Sig Dispense Refill    VASCEPA 1 g CAPS capsule Take 2 capsules by mouth daily 180 capsule 3    fenofibrate (TRICOR) 145 MG tablet Take 1 tablet by mouth every other day 90 tablet 3    B-D UF III MINI PEN NEEDLES 31G X 5 MM MISC INJECT 1 PEN NEEDLE INTO   THE SKIN DAILY 90 each 3    losartan (COZAAR) 50 MG tablet TAKE 1 TABLET DAILY 90 tablet 3    lovastatin (MEVACOR) 20 MG tablet TAKE 1 TABLET NIGHTLY 90 tablet 3    ibuprofen (ADVIL;MOTRIN) 600 MG tablet Take 1 tablet by mouth every 6 hours as needed for Pain 30 tablet 0    FARXIGA 5 MG tablet Take 10 mg by mouth every morning       glimepiride (AMARYL) 4 MG tablet Take 4 mg by mouth 2 times daily       BASAGLAR KWIKPEN 100 UNIT/ML injection pen Inject 60 Units into the skin daily       aspirin 81 MG chewable tablet Take 1 tablet by mouth daily Hold for 5 days after surgery, until 1/16/18 (Patient taking differently: Take 81 mg by mouth daily) 30 tablet 0    VENTOLIN  (90 BASE) MCG/ACT inhaler inhale 2 puff every 4 hours prn  0 Seasonal  Social History     Tobacco Use   Smoking Status Never   Smokeless Tobacco Never     (Ifpatient a smoker, smoking cessation counseling offered)    Social History     Substance and Sexual Activity   Alcohol Use Yes    Comment: once a week       REVIEW OF SYSTEMS:  Review of Systems    Physical Exam:      Vitals:    08/22/22 1131   BP: 110/72   Pulse: 77   Temp: 97.5 °F (36.4 °C)   SpO2: 95%     Body mass index is 29.16 kg/m². Patient is a 79 y.o. male in no acute distress and alert and oriented to person, place and time. Physical Exam  Constitutional: Patient in no acute distress. Neuro: Alert and oriented to person, place and time. Psych: Mood normal, affect normal  Skin: No rash noted  HEENT: Head: Normocephalic andatraumatic  Conjunctivae and EOM are normal. Pupils are equal, round  Nose:Normal  Right External Ear: Normal; Left External Ear: Normal  Mouth: Mucosa Moist  Neck: Supple    Assessment and Plan      1. 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 Prescriptions Ordered:  No orders of the defined types were placed in this encounter. Orders Placed:  No orders of the defined types were placed in this encounter. Jerardo Finney MD    Agree with the ROS entered by the MA.

## 2022-08-24 ENCOUNTER — TELEPHONE (OUTPATIENT)
Dept: UROLOGY | Age: 67
End: 2022-08-24

## 2022-08-24 NOTE — TELEPHONE ENCOUNTER
Per Dr. Lavon Zhao - pt to have cysto, Left URS, possible left renal pelvic biopsy & left stent placement w/ HLL at [facility]       ---------------------    LM for patient to call back to schedule

## 2022-09-02 RX ORDER — FENOFIBRATE 145 MG/1
TABLET, COATED ORAL
Qty: 90 TABLET | Refills: 3 | Status: SHIPPED | OUTPATIENT
Start: 2022-09-02

## 2022-09-13 ENCOUNTER — HOSPITAL ENCOUNTER (OUTPATIENT)
Dept: PREADMISSION TESTING | Age: 67
Discharge: HOME | End: 2022-09-17
Payer: MEDICARE

## 2022-09-13 VITALS
BODY MASS INDEX: 31.07 KG/M2 | DIASTOLIC BLOOD PRESSURE: 69 MMHG | TEMPERATURE: 97.1 F | OXYGEN SATURATION: 97 % | HEIGHT: 72 IN | HEART RATE: 80 BPM | WEIGHT: 229.4 LBS | RESPIRATION RATE: 16 BRPM | SYSTOLIC BLOOD PRESSURE: 153 MMHG

## 2022-09-13 LAB
ANION GAP SERPL CALCULATED.3IONS-SCNC: 14 MMOL/L (ref 9–17)
BUN BLDV-MCNC: 16 MG/DL (ref 8–23)
BUN/CREAT BLD: 17 (ref 9–20)
CALCIUM SERPL-MCNC: 9.4 MG/DL (ref 8.6–10.4)
CHLORIDE BLD-SCNC: 99 MMOL/L (ref 98–107)
CO2: 23 MMOL/L (ref 20–31)
CREAT SERPL-MCNC: 0.92 MG/DL (ref 0.7–1.2)
EKG ATRIAL RATE: 72 BPM
EKG P AXIS: 30 DEGREES
EKG P-R INTERVAL: 206 MS
EKG Q-T INTERVAL: 412 MS
EKG QRS DURATION: 92 MS
EKG QTC CALCULATION (BAZETT): 451 MS
EKG R AXIS: 84 DEGREES
EKG T AXIS: 74 DEGREES
EKG VENTRICULAR RATE: 72 BPM
ESTIMATED AVERAGE GLUCOSE: 183 MG/DL
GFR AFRICAN AMERICAN: >60 ML/MIN
GFR NON-AFRICAN AMERICAN: >60 ML/MIN
GFR SERPL CREATININE-BSD FRML MDRD: ABNORMAL ML/MIN/{1.73_M2}
GLUCOSE BLD-MCNC: 349 MG/DL (ref 70–99)
HBA1C MFR BLD: 8 % (ref 4–6)
HCT VFR BLD CALC: 46.1 % (ref 40.7–50.3)
HEMOGLOBIN: 16 G/DL (ref 13–17)
MCH RBC QN AUTO: 30.5 PG (ref 25.2–33.5)
MCHC RBC AUTO-ENTMCNC: 34.7 G/DL (ref 28.4–34.8)
MCV RBC AUTO: 88 FL (ref 82.6–102.9)
NRBC AUTOMATED: 0 PER 100 WBC
PDW BLD-RTO: 12.2 % (ref 11.8–14.4)
PLATELET # BLD: 179 K/UL (ref 138–453)
PMV BLD AUTO: 10.3 FL (ref 8.1–13.5)
POTASSIUM SERPL-SCNC: 4.3 MMOL/L (ref 3.7–5.3)
RBC # BLD: 5.24 M/UL (ref 4.21–5.77)
SODIUM BLD-SCNC: 136 MMOL/L (ref 135–144)
WBC # BLD: 7.5 K/UL (ref 3.5–11.3)

## 2022-09-13 PROCEDURE — 80048 BASIC METABOLIC PNL TOTAL CA: CPT

## 2022-09-13 PROCEDURE — 85027 COMPLETE CBC AUTOMATED: CPT

## 2022-09-13 PROCEDURE — 83036 HEMOGLOBIN GLYCOSYLATED A1C: CPT

## 2022-09-13 PROCEDURE — 93005 ELECTROCARDIOGRAM TRACING: CPT | Performed by: ANESTHESIOLOGY

## 2022-09-13 PROCEDURE — 36415 COLL VENOUS BLD VENIPUNCTURE: CPT

## 2022-09-13 RX ORDER — UBIDECARENONE 75 MG
50 CAPSULE ORAL DAILY
COMMUNITY

## 2022-09-13 RX ORDER — MONTELUKAST SODIUM 10 MG/1
10 TABLET ORAL NIGHTLY
COMMUNITY
End: 2022-10-13 | Stop reason: ALTCHOICE

## 2022-09-13 ASSESSMENT — PAIN SCALES - GENERAL: PAINLEVEL_OUTOF10: 0

## 2022-09-13 NOTE — H&P
History and Physical Service   [facility]    HISTORY AND PHYSICAL EXAMINATION            Date of Evaluation: 9/13/2022  Patient name:  Charli Rojo  MRN:   [id number]  YOB: 1955  PCP:    Gracia Caba MD    History Obtained From:     Patient, medical records    History of Present Illness: This is Charli Rojo a 79 y.o. male who presents for a pre-admission testing appointment for an upcoming [address] XI by Kerrie Alvarez DO scheduled on 9/28/2022 at 0730 due to Bilateral inguinal hernia without obstruction or gangrene, recurrence not specified [K40.20]. The patient's chief complaint is bilateral groin pain that has progressively worsened over the past 2-3 months. Pain is aggravated by standing and sneeze or cough and is minimally relieved with rest. Patient states he does not reduce the hernia sites. Denies changes in bowel habits, nausea, vomiting. Denies recent falls and injuries. Functional Capacity per pt:  1) Pt is able to walk 2 city blocks on level ground without SOB. 2) Pt is unsure if he would be able to climb 2 flights of stairs without SOB. \"Maybe some SOB\"  3) Pt is able to walk up a hill for 1-2 city blocks without SOB. Past Medical History:     Past Medical History:   Diagnosis Date    Asthma     Dr. Hoa Amaya. 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 Alfrieda Klinefelter PCP    Hypertension     managed by Dr. Alfrieda Klinefelter PCP.  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 Loli Gomes Endocrinologist    Ureteral stent retained     in place x 2    Wears glasses         Past Surgical History:     Past Surgical History:   Procedure Laterality Date    BLADDER TUMOR EXCISION  2009    benign    BRONCHOSCOPY      COLONOSCOPY  09/08/2015    2 times with polypectomy- Dr. Reece Altamirano Right 06/17/2020    HOLMIUM LASER, CYSTOSCOPY, URETEROSCOPY, STENT PLACEMENT performed by John Sosa MD at [address] Left 09/21/2015    2 stents in [address] CYSTOSCOPY  09/30/2015    EYE SURGERY      cataract surgery both eyes    HERNIA REPAIR Left     inguinal    KNEE ARTHROSCOPY      LITHOTRIPSY Left     x2    LITHOTRIPSY  09/30/2015    laser    LITHOTRIPSY Left 03/05/2021    LITHOTRIPSY Left 3/5/2021    ESWL EXTRACORPOREAL SHOCK WAVE LITHOTRIPSY  (NEX-MED CONF# [id number] - DAISY) [facility]    right lower lobe \"lung carcinoid\" removal. Dr. Radha Tracy ESWL Left 01/10/2018    ESWL EXTRACORPEAL SHOCK WAVE LITHOTRIPSY, POSSIBLE  STENT PLACEMENT (NEX MED CONF# [id number]) performed by John Sosa MD at [address] Left 06/09/2014    Excision lesion of nose. Dr. Rubén Fuentes.     SHOULDER SURGERY Right     closed manipulation    SHOULDER SURGERY Left     open manipulation    SKIN BIOPSY  11/12/2007    meir acilla and neck--squamous papillomas--lft buttock--subcutaneous abscess, back--compound nevus, lft neck--lentigo simplex, lft chest --junctional nevus, rt forearm--blue nevus    SKIN BIOPSY  12/09/2009    lft thigh--follicular cyst    SKIN BIOPSY  02/10/2010    rt thigh--ruptured epidermal inclusion cyst    SKIN BIOPSY  09/29/2021     BACK LESION BIOPSY EXCISION (N/A Back    SKIN BIOPSY N/A 9/29/2021    BACK LESION BIOPSY EXCISION performed by Philly Mcdermott MD at [address] [place] [address]      as a child    URETER STENT PLACEMENT Left 09/30/2015    tiems 2 stents        Medications Prior to Admission:     Prior to Admission medications    Medication Sig Start Date End Date Taking?  Authorizing Provider   montelukast (SINGULAIR) 10 MG tablet Take 10 mg by mouth nightly   Yes Historical Provider, MD   vitamin B-12 (CYANOCOBALAMIN) 100 MCG tablet Take 50 mcg by mouth daily   Yes Historical Provider, MD   fenofibrate (TRICOR) 145 MG tablet TAKE 1 TABLET DAILY  Patient taking differently: Taking every other day 9/2/22   Gracia Caba MD   B-D UF III MINI PEN NEEDLES 31G X 5 MM MISC INJECT 1 PEN NEEDLE INTO   THE SKIN DAILY 5/27/22   Gracia Caba MD   losartan (COZAAR) 50 MG tablet TAKE 1 TABLET DAILY 12/17/21   Gracia Caba MD   lovastatin (MEVACOR) 20 MG tablet TAKE 1 TABLET NIGHTLY  Patient taking differently: daily TAKE 1 TABLET NIGHTLY 12/2/21   Gracia Caba MD   ibuprofen (ADVIL;MOTRIN) 600 MG tablet Take 1 tablet by mouth every 6 hours as needed for Pain 6/14/20   DO GABBY Barrow 5 MG tablet Take 10 mg by mouth every morning  4/21/19   Historical Provider, MD   glimepiride (AMARYL) 4 MG tablet Take 4 mg by mouth daily Take 2 tablets once daily 4/27/19   Historical Provider, MD Nicole BRENNANIKPEN 100 UNIT/ML injection pen Inject 62 Units into the skin daily 6/2/19   Historical Provider, MD   aspirin 81 MG chewable tablet Take 1 tablet by mouth daily Hold for 5 days after surgery, until 1/16/18  Patient taking differently: Take 81 mg by mouth daily 1/10/18   Mike Alvarado MD   VENTOLIN  (90 BASE) MCG/ACT inhaler inhale 2 puff every 4 hours prn  Patient not taking: Reported on 9/13/2022 6/24/16   Historical Provider, MD        Allergies:     Seasonal    Social History:     Tobacco:    reports that he has never smoked. He has never used smokeless tobacco.  Alcohol:      reports current alcohol use. Drug Use:  reports no history of drug use. Family History:     Family History   Problem Relation Age of Onset    Heart Disease Mother     Diabetes Mother     Heart Disease Father     Diabetes Sister     Diabetes Maternal Grandmother     Diabetes Brother        Review of Systems:     Positive and Negative as described in HPI. CONSTITUTIONAL:  Negative for fevers, chills, sweats, fatigue, and weight loss. HEENT: Cataracts bilateral eyes. Bilateral hearing aids. Wears glasses. Negative for rhinorrhea, and throat pain. RESPIRATORY: Asthma. Hx lung cancer (5/2011) with right lower lobectomy. Occasional cough in the morning with mucus. Negative for shortness of breath, congestion, and wheezing. CARDIOVASCULAR: HTN. HLD. Negative for chest pain, blood clot, irregular heartbeat, and palpitations. GASTROINTESTINAL: Occasional diarrhea. Negative for reflux, nausea, vomiting, constipation, change in bowel habits, and abdominal pain. GENITOURINARY: BPH. Hx bladder tumor and kidney stones. Urinary frequency. Follows with Dr. Nayla Anne - awaiting appointment for cystoscopy due to abnormality of left kidney on CT abdomen. Negative for difficulty of urination, burning with urination. INTEGUMENT: Easy bruising. Negative for rash, skin lesions  HEMATOLOGIC/LYMPHATIC:  Negative for swelling/edema. ALLERGIC/IMMUNOLOGIC:  Negative for urticaria and itching. ENDOCRINE: Diabetes - follows with endocrinology. Increase in thirst. Negative for increase in urination, and heat or cold intolerance. MUSCULOSKELETAL: Negative joint pains, muscle aches, and swelling of joints.   NEUROLOGICAL: Negative for headaches, dizziness, lightheadedness, numbness, and tingling extremities. BEHAVIOR/PSYCH: Negative for depression and anxiety. Physical Exam:   BP (!) 153/69   Pulse 80   Temp 97.1 °F (36.2 °C) (Temporal)   Resp 16   Ht 6' (1.829 m)   Wt 229 lb 6.4 oz (104.1 kg)   SpO2 97%   BMI 31.11 kg/m²   No LMP for male patient. No obstetric history on file. No results for input(s): POCGLU in the last 72 hours. General Appearance:  Alert, well appearing, and in no acute distress. Mental status:  Oriented to person, place, and time. Head:  Normocephalic and atraumatic. Eye: Wearing glasses. No icterus, redness, pupils equal and reactive, extraocular eye movements intact, and conjunctiva clear. Ear: Bilateral hearing aids. Hard of hearing. Nose:  No drainage noted. Mouth:  Mucous membranes moist.  Neck:  Supple and no carotid bruits noted. Lungs: Diminished right lower lobe otherwise clear to auscultation. Bilateral equal air entry, no wheezing, rales or rhonchi, and normal effort. Cardiovascular:  Normal rate, regular rhythm, no murmur, gallop, or rub. Abdomen: Soft, rotund, nontender, nondistended, and active bowel sounds. Neurologic:  Normal speech and cranial nerves II through XII grossly intact. Strength 5/5 bilaterally. Skin:  No gross lesions, rashes, bruising, or bleeding on exposed skin area. Extremities: 3+ pitting edema bilateral lower extremities. Posterior tibial pulses 2+ bilaterally. No calf tenderness with palpation. Psych: Normal affect.      Investigations:      Laboratory Testing:  Recent Results (from the past 24 hour(s))   CBC    Collection Time: 09/13/22  9:24 AM   Result Value Ref Range    WBC 7.5 3.5 - 11.3 k/uL    RBC 5.24 4.21 - 5.77 m/uL    Hemoglobin 16.0 13.0 - 17.0 g/dL    Hematocrit 46.1 40.7 - 50.3 %    MCV 88.0 82.6 - 102.9 fL    MCH 30.5 25.2 - 33.5 pg    MCHC 34.7 28.4 - 34.8 g/dL    RDW 12.2 11.8 - 14.4 %    Platelets 104 406 - 631 k/uL    MPV 10.3 8.1 - 13.5 fL    NRBC Automated 0.0 0.0 per 100 WBC   EKG 12 Lead    Collection Time: 09/13/22  9:36 AM   Result Value Ref Range    Ventricular Rate 72 BPM    Atrial Rate 72 BPM    P-R Interval 206 ms    QRS Duration 92 ms    Q-T Interval 412 ms    QTc Calculation (Bazett) 451 ms    P Axis 30 degrees    R Axis 84 degrees    T Axis 74 degrees       Recent Labs     09/13/22  0924   HGB 16.0   HCT 46.1   WBC 7.5   MCV 88.0       No results for input(s): COVID19 in the last 720 hours. *Please note that labs listed above are the most recent lab values available in EPIC at the time of the visit and additional labs may have been drawn or resulted since that time. Imaging/Diagnostics:    8/3/2022:  Narrative   EXAMINATION:   CT OF THE ABDOMEN AND PELVIS WITH CONTRAST 8/3/2022 8:40 am       TECHNIQUE:   CT of the abdomen and pelvis was performed with the administration of   intravenous contrast. Multiplanar reformatted images are provided for review. Automated exposure control, iterative reconstruction, and/or weight based   adjustment of the mA/kV was utilized to reduce the radiation dose to as low   as reasonably achievable. COMPARISON:   06/21/2020 and 06/13/2020       HISTORY:   ORDERING SYSTEM PROVIDED HISTORY: Left groin pain   TECHNOLOGIST PROVIDED HISTORY:       STAT Creatinine as needed:->Yes   history  of left inguinal hernia repair with mesh, r/o recurrence   Reason for Exam: history of left inguinal hernia repair with mesh, r/o   recurrence, Left groin pain   Relevant Medical/Surgical History: hernia repair, lithotripsy, urteral stent,   benign bladder tumor excision       FINDINGS:   Lower Chest: There is a 9 mm pulmonary nodule in the left lower lobe medially   on image number 29 which is increased in size when compared to the prior   study. There is another pulmonary nodule in the left lower lobe which is   stable on image number 26.   There is hyperinflation consistent with chronic   obstructive pulmonary disease. There is calcification of the pleura on the   right medially       Organs: There are gallstones in the gallbladder which is otherwise   unremarkable in appearance. The liver, pancreas, spleen, adrenal glands are   normal.  There are bilateral renal cyst.       GI/Bowel: There is no evidence for bowel obstruction. There is a lipoma in   the proximal transverse colon. There is diverticulosis in the colon but no   evidence for acute inflammatory changes. Pelvis: The bladder is unremarkable in appearance and there is no evidence   for free air or free fluid in the pelvis. There is bilateral inguinal hernia   without contained bowel. Peritoneum/Retroperitoneum: There is wall thickening and soft tissue   stranding around the left renal pelvis and proximal left ureter. There is no   evidence for aneurysm. There is calcific atherosclerotic disease in the aorta       Bones/Soft Tissues: Degenerative change           Impression   Thickening of the wall the proximal collecting system. Differential   diagnosis would include inflammatory, infectious, and neoplastic etiologies   including transitional cell carcinoma. Recommend correlation with clinical   symptomatology and urinalysis. Retrograde pyelography may be helpful to   evaluate for upper tract neoplasm. Interval enlargement of a pulmonary nodule in the medial portion of the left   lower lobe as described above. Differential diagnosis would include an   enlarging granuloma although a neoplastic etiology such as metastatic disease   is a diagnostic consideration. CT chest is recommended for further   evaluation. These results were sent to [facility] to be called   to a license caregiver     8/13/2022:  Narrative   EXAMINATION:   LOW DOSE OF THE CT  CHEST WITHOUT CONTRAST 8/13/2022 8:50 am       TECHNIQUE:   Low Dose of the CT Chest without the administration of intravenous contrast   (MA=40).   Multiplanar reformatted images are provided for review. Automated   exposure control, iterative reconstruction, and/or weight based adjustment of   the mA/kV was utilized to reduce the radiation dose to as low as reasonably   achievable. COMPARISON:   14 March 2011; CT abdomen and pelvis of 3 August 2022       HISTORY:   ORDERING SYSTEM PROVIDED HISTORY: Lung nodule, multiple   TECHNOLOGIST PROVIDED HISTORY:   Age: 79 y.o. Smoking History:   Social History   Tobacco Use   Smoking status: Never   Smokeless tobacco: Never   Vaping Use   Vaping Use: Never used   Alcohol use: Yes   Comment: once a week   Drug use: No       Pack years: 0   Last CT lung screen: No previous lung cancer screening exam   Reason for Exam: lung nodule unsure which side per pt. no chest complaints pt   is a nonsmoker       Left lower lobe nodule on recent CT examination. FINDINGS:   Mediastinum: No abnormality in the thyroid. No mediastinal adenopathy, acute   aortic abnormality, cardiomegaly, pericardial effusion or epicardial   adenopathy is noted. Mild to moderate calcified plaque is noted in the   aortic arch with moderate coronary artery calcification. Granulomas are   present in the mediastinum. Lungs/pleura: No effusion or consolidation. Mild scarring is present at the   right lung base medially. Upper lobe mass of 14 mm demonstrated on study of   14 March 2011 is not redemonstrated. There are adjacent medial pleural   changes and surgical clip suggesting probable removal in the interim. A 3.5   mm right upper lobe nodule image 30 series 4 is noted. Not present in 2011. Stable 3.1 mm nodule image 48 series 4 right upper lobe is noted. 7.9 mm   left lower lobe nodule is noted, previously 6.4 mm on prior 2011 study. Medial aspect of left lower lobe nodule 8.7 mm is noted unchanged from prior   exam.  Small nodule left lower lobe in the medial costophrenic gutter   posteriorly 4.7 mm was not present in 2011.   2.6 mm left upper lobe nodule   image 57 series 4 was not present in 2011. Tracheobronchial tree is patent. Upper Abdomen: Nonobstructing nephroliths are present in both kidneys. 2.1   cm left renal cyst is noted. Gallstones are noted in the gallbladder. Soft Tissues/Bones: No acute osseous or soft tissue abnormality. Multilevel   degenerative changes are evident. Impression   1. Prior upper lobe mass of 14 mm in 2011 not redemonstrated. Postsurgical   changes are present in this area consistent with interval removal.       2.  Other previously present nodules are generally stable as above. 3.  There has been interval development of a few nodules as above. 1 nodule   has shown interval growth since 2011. 4.  No mediastinal adenopathy. Coronary artery calcification. 5.  Nonobstructive nephroliths in the kidneys. Gallstones. Renal cysts. Follow-up as below. RECOMMENDATIONS:   Follow-up CT scan in 3 months to reassess the nodule which has shown some   mild growth since . EK2022: See Epic. Diagnosis:      1. Bilateral inguinal hernia without obstruction or gangrene, recurrence not specified [K40.20]    Plans:     1.  HERNIA INGUINAL REPAIR LAPAROSCOPIC BILATERAL ROBOTIC HAMILTON Begum - CNP  2022  9:49 AM

## 2022-09-13 NOTE — PRE-PROCEDURE INSTRUCTIONS
[facility] 05[facility]45 AM    Once you enter the hospital lobby, take the elevators to the second floor. Check-In is at the surgery registration desk. Continue to take your home medications as you normally do up to and including the night before surgery with the exception of any blood thinning medications. Please stop any blood thinning medications as directed by your surgeon or prescribing physician. Failure to stop certain medications may interfere with your scheduled surgery. These may include:  Aspirin, Warfarin (Coumadin), Clopidogrel (Plavix), Ibuprofen (Motrin, Advil), Naproxen (Aleve), Meloxicam (Mobic), Celecoxib (Celebrex), Eliquis, Pradaxa, Xarelto, Effient, Fish Oil, Herbal supplements. Stop taking baby aspirin, vitamin B12 and fish oil  7 days prior to surgery     If you are diabetic, do not take any of your diabetic medications by mouth the morning of surgery. If you are taking insulin contact the doctor that manages your diabetes for instructions about any changes to your insulin dosages the day before surgery. Do not inject insulin or other injectable diabetic medications the morning of surgery unless otherwise instructed by the doctor who manages your diabetes. Please take the following medication(s) the day of surgery with a small sip of water: LOSARTAN      PREPARING FOR YOUR SURGERY:     Before surgery, you can play an important role in your own health. Because skin is not sterile, we need to be sure that your skin is as free of germs as possible before surgery by carefully washing before surgery. Preparing or prepping skin before surgery can reduce the risk of a surgical site infection.   Do not shave the area of your body where your surgery will be performed unless you received specific permission from your physician.     You will need to shower at home the night before surgery and the morning of surgery with a special soap called chlorhexidine gluconate (CHG*). *Not to be used by people allergic to Chlorhexidine Gluconate (CHG). Following these instructions will help you be sure that your skin is clean before surgery. Instructions on cleaning your skin before surgery: The night before your surgery: You will need to shower with warm water (not hot) and the CHG soap. Use a clean wash cloth and a clean towel. Have clean clothes available to put on after the shower. First wash your hair with regular shampoo. Rinse your hair and body thoroughly to remove the shampoo. Wash your face and genital area (private parts) with your regular soap or water only. Thoroughly rinse your body with warm water from the neck down. Turn water off to prevent rinsing the soap off too soon. With a clean wet washcloth and half of the CHG soap in the bottle, lather your entire body from the neck down. Do not use CHG soap near your eyes or ears to avoid injury to those areas. Wash thoroughly, paying special attention to the area where your surgery will be performed. Wash your body gently for five (5) minutes. Avoid scrubbing your skin too hard. Turn the water back on and rinse your body thoroughly. Pat yourself dry with a clean, soft towel. Do not apply lotion, cream or powder. Dress with clean freshly washed clothes. The morning of surgery:    Repeat shower following steps above - using remaining half of CHG soap in bottle. Patient Instructions: If you are having any type of anesthesia you are to have nothing to eat or drink after midnight the night before your surgery. This includes gum, hard candy, mints, water or smoking or chewing tobacco.  The only exception to this is a small sip of water to take with any morning dose of heart, blood pressure, or seizure medications. No alcoholic beverages for 24 hours prior to surgery. Brush your teeth but do not swallow water.     Bring your eye glasses and case with you. No contacts are to be worn the day of surgery. You also may bring your hearing aids. Most surgical procedures involving anesthesia will require that you remove your dentures prior to surgery. If you are on C-PAP or Bi-PAP at home and plan on staying in the hospital overnight for your surgery please bring the machine with you. Do not wear any jewelry or body piercings day of surgery. Also, NO lotion, perfume or deodorant to be used the day of surgery. No nail polish on the operative extremity (arm/leg surgeries)    If you are staying overnight with us, please bring a small bag of necessary personal items. Please wear loose, comfortable clothing. If you are potentially going to have a cast or brace bring clothing that will fit over them. In case of illness - If you have cold or flu like symptoms (high fever, runny nose, sore throat, cough, etc.) rash, nausea, vomiting, loose stools, and/or recent contact with someone who has a contagious disease (chicken pox, measles, etc.) Please call your doctor before coming to the hospital.         Day of Surgery/Procedure:    As a patient at [facility] IN[facility] you can expect quality medical and nursing care that is centered on your individual needs. Our goal is to make your surgical experience as comfortable as possible    . Transportation After Your Surgery/Procedure: You will need a friend or family member to drive you home after your procedure. Your  must be 25years of age or older and able to sign off on your discharge instructions. A taxi cab or any other form of public transportation is not acceptable. Your friend or family member must stay at the hospital throughout your procedure. Someone must remain with you for the first 24 hours after your surgery if you receive anesthesia or medication.   If you do not have someone to stay with you, your procedure may be cancelled.       If you have any other questions regarding your procedure or the day of surgery, please call [phone]      _________________________  ____________________________  Signature (patient)                                       Signature (provider)             Date

## 2022-09-13 NOTE — H&P (VIEW-ONLY)
History and Physical Service   [facility] [place] - INPATIENT    HISTORY AND PHYSICAL EXAMINATION            Date of Evaluation: 9/13/2022  Patient name:  Louisa Santiago  MRN:   [id number]  YOB: 1955  PCP:    Dory Hurley MD    History Obtained From:     Patient, medical records    History of Present Illness: This is Louisa ren 79 y.o. male who presents for a pre-admission testing appointment for an upcoming [address] XI by Chino Alberto DO scheduled on 9/28/2022 at 0730 due to Bilateral inguinal hernia without obstruction or gangrene, recurrence not specified [K40.20]. The patient's chief complaint is bilateral groin pain that has progressively worsened over the past 2-3 months. Pain is aggravated by standing and sneeze or cough and is minimally relieved with rest. Patient states he does not reduce the hernia sites. Denies changes in bowel habits, nausea, vomiting. Denies recent falls and injuries. Functional Capacity per pt:  1) Pt is able to walk 2 city blocks on level ground without SOB. 2) Pt is unsure if he would be able to climb 2 flights of stairs without SOB. \"Maybe some SOB\"  3) Pt is able to walk up a hill for 1-2 city blocks without SOB. Past Medical History:     Past Medical History:   Diagnosis Date    Asthma     Dr. Mateusz Pichardo. 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 Gayatri Wolfe PCP    Hypertension     managed by Dr. Gayatri Wolfe PCP.  Does not have a cardiologist    IBS (irritable bowel syndrome)     Kidney neck--lentigo simplex, lft chest --junctional nevus, rt forearm--blue nevus    SKIN BIOPSY  12/09/2009    lft thigh--follicular cyst    SKIN BIOPSY  02/10/2010    rt thigh--ruptured epidermal inclusion cyst    SKIN BIOPSY  09/29/2021     BACK LESION BIOPSY EXCISION (N/A Back    SKIN BIOPSY N/A 9/29/2021    BACK LESION BIOPSY EXCISION performed by Cynthia Valladares MD at [address] [place] [address]      as a child    URETER STENT PLACEMENT Left 09/30/2015    tiems 2 stents        Medications Prior to Admission:     Prior to Admission medications    Medication Sig Start Date End Date Taking?  Authorizing Provider   montelukast (SINGULAIR) 10 MG tablet Take 10 mg by mouth nightly   Yes Historical Provider, MD   vitamin B-12 (CYANOCOBALAMIN) 100 MCG tablet Take 50 mcg by mouth daily   Yes Historical Provider, MD   fenofibrate (TRICOR) 145 MG tablet TAKE 1 TABLET DAILY  Patient taking differently: Taking every other day 9/2/22   Ari Barcenas MD   B-D UF III MINI PEN NEEDLES 31G X 5 MM MISC INJECT 1 PEN NEEDLE INTO   THE SKIN DAILY 5/27/22   Ari Barcenas MD   losartan (COZAAR) 50 MG tablet TAKE 1 TABLET DAILY 12/17/21   Ari Barcenas MD   lovastatin (MEVACOR) 20 MG tablet TAKE 1 TABLET NIGHTLY  Patient taking differently: daily TAKE 1 TABLET NIGHTLY 12/2/21   Ari Barcenas MD   ibuprofen (ADVIL;MOTRIN) 600 MG tablet Take 1 tablet by mouth every 6 hours as needed for Pain 6/14/20   Ronn Bosworth, DO FARXIGA 5 MG tablet Take 10 mg by mouth every morning  4/21/19   Historical Provider, MD   glimepiride (AMARYL) 4 MG tablet Take 4 mg by mouth daily Take 2 tablets once daily 4/27/19   Historical Provider, MD Davian Rosen KWIKPEN 100 UNIT/ML injection pen Inject 62 Units into the skin daily 6/2/19   Historical Provider, MD   aspirin 81 MG chewable tablet Take 1 tablet by mouth daily Hold for 5 days after surgery, until 1/16/18  Patient taking differently: Take 81 mg by mouth daily 1/10/18   Mike Alvarado MD   VENTOLIN  (90 BASE) MCG/ACT inhaler inhale 2 puff every 4 hours prn  Patient not taking: Reported on 9/13/2022 6/24/16   Historical Provider, MD        Allergies:     Seasonal    Social History:     Tobacco:    reports that he has never smoked. He has never used smokeless tobacco.  Alcohol:      reports current alcohol use. Drug Use:  reports no history of drug use. Family History:     Family History   Problem Relation Age of Onset    Heart Disease Mother     Diabetes Mother     Heart Disease Father     Diabetes Sister     Diabetes Maternal Grandmother     Diabetes Brother        Review of Systems:     Positive and Negative as described in HPI. CONSTITUTIONAL:  Negative for fevers, chills, sweats, fatigue, and weight loss. HEENT: Cataracts bilateral eyes. Bilateral hearing aids. Wears glasses. Negative for rhinorrhea, and throat pain. RESPIRATORY: Asthma. Hx lung cancer (5/2011) with right lower lobectomy. Occasional cough in the morning with mucus. Negative for shortness of breath, congestion, and wheezing. CARDIOVASCULAR: HTN. HLD. Negative for chest pain, blood clot, irregular heartbeat, and palpitations. GASTROINTESTINAL: Occasional diarrhea. Negative for reflux, nausea, vomiting, constipation, change in bowel habits, and abdominal pain. GENITOURINARY: BPH. Hx bladder tumor and kidney stones. Urinary frequency. Follows with Dr. Kassy Dimas - awaiting appointment for cystoscopy due to abnormality of left kidney on CT abdomen. Negative for difficulty of urination, burning with urination. INTEGUMENT: Easy bruising. Negative for rash, skin lesions  HEMATOLOGIC/LYMPHATIC:  Negative for swelling/edema. ALLERGIC/IMMUNOLOGIC:  Negative for urticaria and itching. ENDOCRINE: Diabetes - follows with endocrinology. Increase in thirst. Negative for increase in urination, and heat or cold intolerance. MUSCULOSKELETAL: Negative joint pains, muscle aches, and swelling of joints.   NEUROLOGICAL: Negative for headaches, dizziness, lightheadedness, numbness, and tingling extremities. BEHAVIOR/PSYCH: Negative for depression and anxiety. Physical Exam:   BP (!) 153/69   Pulse 80   Temp 97.1 °F (36.2 °C) (Temporal)   Resp 16   Ht 6' (1.829 m)   Wt 229 lb 6.4 oz (104.1 kg)   SpO2 97%   BMI 31.11 kg/m²   No LMP for male patient. No obstetric history on file. No results for input(s): POCGLU in the last 72 hours. General Appearance:  Alert, well appearing, and in no acute distress. Mental status:  Oriented to person, place, and time. Head:  Normocephalic and atraumatic. Eye: Wearing glasses. No icterus, redness, pupils equal and reactive, extraocular eye movements intact, and conjunctiva clear. Ear: Bilateral hearing aids. Hard of hearing. Nose:  No drainage noted. Mouth:  Mucous membranes moist.  Neck:  Supple and no carotid bruits noted. Lungs: Diminished right lower lobe otherwise clear to auscultation. Bilateral equal air entry, no wheezing, rales or rhonchi, and normal effort. Cardiovascular:  Normal rate, regular rhythm, no murmur, gallop, or rub. Abdomen: Soft, rotund, nontender, nondistended, and active bowel sounds. Neurologic:  Normal speech and cranial nerves II through XII grossly intact. Strength 5/5 bilaterally. Skin:  No gross lesions, rashes, bruising, or bleeding on exposed skin area. Extremities: 3+ pitting edema bilateral lower extremities. Posterior tibial pulses 2+ bilaterally. No calf tenderness with palpation. Psych: Normal affect.      Investigations:      Laboratory Testing:  Recent Results (from the past 24 hour(s))   CBC    Collection Time: 09/13/22  9:24 AM   Result Value Ref Range    WBC 7.5 3.5 - 11.3 k/uL    RBC 5.24 4.21 - 5.77 m/uL    Hemoglobin 16.0 13.0 - 17.0 g/dL    Hematocrit 46.1 40.7 - 50.3 %    MCV 88.0 82.6 - 102.9 fL    MCH 30.5 25.2 - 33.5 pg    MCHC 34.7 28.4 - 34.8 g/dL    RDW 12.2 11.8 - 14.4 %    Platelets 561 720 - 015 k/uL    MPV 10.3 8.1 - 13.5 fL    NRBC Automated 0.0 0.0 per 100 WBC   EKG 12 Lead    Collection Time: 09/13/22  9:36 AM   Result Value Ref Range    Ventricular Rate 72 BPM    Atrial Rate 72 BPM    P-R Interval 206 ms    QRS Duration 92 ms    Q-T Interval 412 ms    QTc Calculation (Bazett) 451 ms    P Axis 30 degrees    R Axis 84 degrees    T Axis 74 degrees       Recent Labs     09/13/22  0924   HGB 16.0   HCT 46.1   WBC 7.5   MCV 88.0       No results for input(s): COVID19 in the last 720 hours. *Please note that labs listed above are the most recent lab values available in EPIC at the time of the visit and additional labs may have been drawn or resulted since that time. Imaging/Diagnostics:    8/3/2022:  Narrative   EXAMINATION:   CT OF THE ABDOMEN AND PELVIS WITH CONTRAST 8/3/2022 8:40 am       TECHNIQUE:   CT of the abdomen and pelvis was performed with the administration of   intravenous contrast. Multiplanar reformatted images are provided for review. Automated exposure control, iterative reconstruction, and/or weight based   adjustment of the mA/kV was utilized to reduce the radiation dose to as low   as reasonably achievable. COMPARISON:   06/21/2020 and 06/13/2020       HISTORY:   ORDERING SYSTEM PROVIDED HISTORY: Left groin pain   TECHNOLOGIST PROVIDED HISTORY:       STAT Creatinine as needed:->Yes   history  of left inguinal hernia repair with mesh, r/o recurrence   Reason for Exam: history of left inguinal hernia repair with mesh, r/o   recurrence, Left groin pain   Relevant Medical/Surgical History: hernia repair, lithotripsy, urteral stent,   benign bladder tumor excision       FINDINGS:   Lower Chest: There is a 9 mm pulmonary nodule in the left lower lobe medially   on image number 29 which is increased in size when compared to the prior   study. There is another pulmonary nodule in the left lower lobe which is   stable on image number 26.   There is hyperinflation consistent with chronic   obstructive pulmonary disease. There is calcification of the pleura on the   right medially       Organs: There are gallstones in the gallbladder which is otherwise   unremarkable in appearance. The liver, pancreas, spleen, adrenal glands are   normal.  There are bilateral renal cyst.       GI/Bowel: There is no evidence for bowel obstruction. There is a lipoma in   the proximal transverse colon. There is diverticulosis in the colon but no   evidence for acute inflammatory changes. Pelvis: The bladder is unremarkable in appearance and there is no evidence   for free air or free fluid in the pelvis. There is bilateral inguinal hernia   without contained bowel. Peritoneum/Retroperitoneum: There is wall thickening and soft tissue   stranding around the left renal pelvis and proximal left ureter. There is no   evidence for aneurysm. There is calcific atherosclerotic disease in the aorta       Bones/Soft Tissues: Degenerative change           Impression   Thickening of the wall the proximal collecting system. Differential   diagnosis would include inflammatory, infectious, and neoplastic etiologies   including transitional cell carcinoma. Recommend correlation with clinical   symptomatology and urinalysis. Retrograde pyelography may be helpful to   evaluate for upper tract neoplasm. Interval enlargement of a pulmonary nodule in the medial portion of the left   lower lobe as described above. Differential diagnosis would include an   enlarging granuloma although a neoplastic etiology such as metastatic disease   is a diagnostic consideration. CT chest is recommended for further   evaluation. These results were sent to [employer] to be called   to a license caregiver     8/13/2022:  Narrative   EXAMINATION:   LOW DOSE OF THE CT  CHEST WITHOUT CONTRAST 8/13/2022 8:50 am       TECHNIQUE:   Low Dose of the CT Chest without the administration of intravenous contrast   (MA=40).   Multiplanar reformatted images are provided for review. Automated   exposure control, iterative reconstruction, and/or weight based adjustment of   the mA/kV was utilized to reduce the radiation dose to as low as reasonably   achievable. COMPARISON:   14 March 2011; CT abdomen and pelvis of 3 August 2022       HISTORY:   ORDERING SYSTEM PROVIDED HISTORY: Lung nodule, multiple   TECHNOLOGIST PROVIDED HISTORY:   Age: 79 y.o. Smoking History:   Social History   Tobacco Use   Smoking status: Never   Smokeless tobacco: Never   Vaping Use   Vaping Use: Never used   Alcohol use: Yes   Comment: once a week   Drug use: No       Pack years: 0   Last CT lung screen: No previous lung cancer screening exam   Reason for Exam: lung nodule unsure which side per pt. no chest complaints pt   is a nonsmoker       Left lower lobe nodule on recent CT examination. FINDINGS:   Mediastinum: No abnormality in the thyroid. No mediastinal adenopathy, acute   aortic abnormality, cardiomegaly, pericardial effusion or epicardial   adenopathy is noted. Mild to moderate calcified plaque is noted in the   aortic arch with moderate coronary artery calcification. Granulomas are   present in the mediastinum. Lungs/pleura: No effusion or consolidation. Mild scarring is present at the   right lung base medially. Upper lobe mass of 14 mm demonstrated on study of   14 March 2011 is not redemonstrated. There are adjacent medial pleural   changes and surgical clip suggesting probable removal in the interim. A 3.5   mm right upper lobe nodule image 30 series 4 is noted. Not present in 2011. Stable 3.1 mm nodule image 48 series 4 right upper lobe is noted. 7.9 mm   left lower lobe nodule is noted, previously 6.4 mm on prior 2011 study. Medial aspect of left lower lobe nodule 8.7 mm is noted unchanged from prior   exam.  Small nodule left lower lobe in the medial costophrenic gutter   posteriorly 4.7 mm was not present in 2011.   2.6 mm left upper lobe nodule   image 57 series 4 was not present in 2011. Tracheobronchial tree is patent. Upper Abdomen: Nonobstructing nephroliths are present in both kidneys. 2.1   cm left renal cyst is noted. Gallstones are noted in the gallbladder. Soft Tissues/Bones: No acute osseous or soft tissue abnormality. Multilevel   degenerative changes are evident. Impression   1. Prior upper lobe mass of 14 mm in 2011 not redemonstrated. Postsurgical   changes are present in this area consistent with interval removal.       2.  Other previously present nodules are generally stable as above. 3.  There has been interval development of a few nodules as above. 1 nodule   has shown interval growth since . 4.  No mediastinal adenopathy. Coronary artery calcification. 5.  Nonobstructive nephroliths in the kidneys. Gallstones. Renal cysts. Follow-up as below. RECOMMENDATIONS:   Follow-up CT scan in 3 months to reassess the nodule which has shown some   mild growth since . EK2022: See Epic. Diagnosis:      1. Bilateral inguinal hernia without obstruction or gangrene, recurrence not specified [K40.20]    Plans:     1.  HERNIA INGUINAL REPAIR LAPAROSCOPIC BILATERAL ROBOTIC XI      Jean-Claude Holland, HAMILTON - CNP  2022  9:49 AM

## 2022-09-14 NOTE — FLOWSHEET NOTE
09/14/22 0939   Anesthesia PAT Clearance   Anesthesia Review Status Anes has reviewed patient for surgery  (Dr. Melvina Adamson reviewed bloodsugar,A1C and history with no further intervention at present time.  Blood sugar required day of the surgery)

## 2022-09-19 NOTE — TELEPHONE ENCOUNTER
Received call back from pt to schedule. .. he is tentatively scheduled for 10/14/2022 @ 7:30am... declined sooner date stating he has scheduling conflicts. .. Sy Bustos will call patient back with all surgery details once scheduled

## 2022-09-23 NOTE — TELEPHONE ENCOUNTER
Cysto, Left URS, HLL, Possible Left Renal Pelvic Biopsy & Left Stent Placement    STV 10/14/2022 @ 7:30am (6am arrival)    PAT: Visit 9/30/2022 @ 1:30pm    **STOP BLOOD THINNERS 10/7/2022**    NPO: Midnight    PO: 10/24/2022 @ 1:30pm --- may cancel PO if Dr. Yaneli Adames does not do biopsy, discussed this at great length with patient and will follow & update patient accordingly    Pt having hernia surgery on 9/28/2022 at [facility]. .. per Dr. Yaneli Adames ok to proceed with stone treatment & biopsy on 10/14/2022. All surgery information given to patient over the phone. Pt verbalized an understanding and all questions were answered. Per patient request, all surgery info emailed to him at [email or web address]

## 2022-09-28 ENCOUNTER — ANESTHESIA (OUTPATIENT)
Dept: OPERATING ROOM | Age: 67
End: 2022-09-28
Payer: MEDICARE

## 2022-09-28 ENCOUNTER — HOSPITAL ENCOUNTER (OUTPATIENT)
Age: 67
Setting detail: OUTPATIENT SURGERY
Discharge: HOME | End: 2022-09-28
Attending: SURGERY | Admitting: SURGERY
Payer: MEDICARE

## 2022-09-28 ENCOUNTER — ANESTHESIA EVENT (OUTPATIENT)
Dept: OPERATING ROOM | Age: 67
End: 2022-09-28
Payer: MEDICARE

## 2022-09-28 VITALS
DIASTOLIC BLOOD PRESSURE: 60 MMHG | HEART RATE: 80 BPM | RESPIRATION RATE: 20 BRPM | BODY MASS INDEX: 31.07 KG/M2 | TEMPERATURE: 96.8 F | HEIGHT: 72 IN | OXYGEN SATURATION: 96 % | SYSTOLIC BLOOD PRESSURE: 111 MMHG | WEIGHT: 229.4 LBS

## 2022-09-28 DIAGNOSIS — K40.90 RIGHT INGUINAL HERNIA: ICD-10-CM

## 2022-09-28 DIAGNOSIS — K40.91 RECURRENT INGUINAL HERNIA OF LEFT SIDE WITHOUT OBSTRUCTION OR GANGRENE: Primary | ICD-10-CM

## 2022-09-28 LAB
GLUCOSE BLD-MCNC: 238 MG/DL (ref 75–110)
GLUCOSE BLD-MCNC: 324 MG/DL (ref 75–110)
GLUCOSE BLD-MCNC: 366 MG/DL (ref 75–110)

## 2022-09-28 PROCEDURE — 2500000003 HC RX 250 WO HCPCS: Performed by: SURGERY

## 2022-09-28 PROCEDURE — 6370000000 HC RX 637 (ALT 250 FOR IP): Performed by: ANESTHESIOLOGY

## 2022-09-28 PROCEDURE — 3600000019 HC SURGERY ROBOT ADDTL 15MIN: Performed by: SURGERY

## 2022-09-28 PROCEDURE — 2500000003 HC RX 250 WO HCPCS: Performed by: NURSE ANESTHETIST, CERTIFIED REGISTERED

## 2022-09-28 PROCEDURE — 6360000002 HC RX W HCPCS: Performed by: SURGERY

## 2022-09-28 PROCEDURE — 82947 ASSAY GLUCOSE BLOOD QUANT: CPT

## 2022-09-28 PROCEDURE — S2900 ROBOTIC SURGICAL SYSTEM: HCPCS | Performed by: SURGERY

## 2022-09-28 PROCEDURE — 6360000002 HC RX W HCPCS: Performed by: NURSE ANESTHETIST, CERTIFIED REGISTERED

## 2022-09-28 PROCEDURE — 7100000010 HC PHASE II RECOVERY - FIRST 15 MIN: Performed by: SURGERY

## 2022-09-28 PROCEDURE — 49650 LAP ING HERNIA REPAIR INIT: CPT | Performed by: SURGERY

## 2022-09-28 PROCEDURE — 3700000001 HC ADD 15 MINUTES (ANESTHESIA): Performed by: SURGERY

## 2022-09-28 PROCEDURE — 3700000000 HC ANESTHESIA ATTENDED CARE: Performed by: SURGERY

## 2022-09-28 PROCEDURE — C1781 MESH (IMPLANTABLE): HCPCS | Performed by: SURGERY

## 2022-09-28 PROCEDURE — 3600000009 HC SURGERY ROBOT BASE: Performed by: SURGERY

## 2022-09-28 PROCEDURE — 7100000011 HC PHASE II RECOVERY - ADDTL 15 MIN: Performed by: SURGERY

## 2022-09-28 PROCEDURE — 2580000003 HC RX 258: Performed by: ANESTHESIOLOGY

## 2022-09-28 PROCEDURE — 2709999900 HC NON-CHARGEABLE SUPPLY: Performed by: SURGERY

## 2022-09-28 PROCEDURE — 7100000000 HC PACU RECOVERY - FIRST 15 MIN: Performed by: SURGERY

## 2022-09-28 PROCEDURE — 7100000001 HC PACU RECOVERY - ADDTL 15 MIN: Performed by: SURGERY

## 2022-09-28 DEVICE — MESH SURG W3.5XL6IN POLY SELF FIXATING RECT W/ RESRB PLA: Type: IMPLANTABLE DEVICE | Site: GROIN | Status: FUNCTIONAL

## 2022-09-28 RX ORDER — PROPOFOL 10 MG/ML
INJECTION, EMULSION INTRAVENOUS PRN
Status: DISCONTINUED | OUTPATIENT
Start: 2022-09-28 | End: 2022-09-28 | Stop reason: SDUPTHER

## 2022-09-28 RX ORDER — SODIUM CHLORIDE, SODIUM LACTATE, POTASSIUM CHLORIDE, CALCIUM CHLORIDE 600; 310; 30; 20 MG/100ML; MG/100ML; MG/100ML; MG/100ML
1000 INJECTION, SOLUTION INTRAVENOUS CONTINUOUS
Status: CANCELLED | OUTPATIENT
Start: 2022-09-28

## 2022-09-28 RX ORDER — PHENYLEPHRINE HCL IN 0.9% NACL 1 MG/10 ML
VIAL (ML) INTRAVENOUS PRN
Status: DISCONTINUED | OUTPATIENT
Start: 2022-09-28 | End: 2022-09-28 | Stop reason: SDUPTHER

## 2022-09-28 RX ORDER — HYDROMORPHONE HYDROCHLORIDE 1 MG/ML
0.5 INJECTION, SOLUTION INTRAMUSCULAR; INTRAVENOUS; SUBCUTANEOUS EVERY 5 MIN PRN
Status: CANCELLED | OUTPATIENT
Start: 2022-09-28

## 2022-09-28 RX ORDER — SODIUM CHLORIDE 0.9 % (FLUSH) 0.9 %
5-40 SYRINGE (ML) INJECTION EVERY 12 HOURS SCHEDULED
Status: CANCELLED | OUTPATIENT
Start: 2022-09-28

## 2022-09-28 RX ORDER — SODIUM CHLORIDE, SODIUM LACTATE, POTASSIUM CHLORIDE, CALCIUM CHLORIDE 600; 310; 30; 20 MG/100ML; MG/100ML; MG/100ML; MG/100ML
INJECTION, SOLUTION INTRAVENOUS CONTINUOUS
Status: DISCONTINUED | OUTPATIENT
Start: 2022-09-28 | End: 2022-09-28 | Stop reason: HOSPADM

## 2022-09-28 RX ORDER — LIDOCAINE HYDROCHLORIDE 20 MG/ML
INJECTION, SOLUTION EPIDURAL; INFILTRATION; INTRACAUDAL; PERINEURAL PRN
Status: DISCONTINUED | OUTPATIENT
Start: 2022-09-28 | End: 2022-09-28 | Stop reason: SDUPTHER

## 2022-09-28 RX ORDER — ONDANSETRON 2 MG/ML
4 INJECTION INTRAMUSCULAR; INTRAVENOUS
Status: CANCELLED | OUTPATIENT
Start: 2022-09-28 | End: 2022-09-29

## 2022-09-28 RX ORDER — LIDOCAINE HYDROCHLORIDE 10 MG/ML
1 INJECTION, SOLUTION EPIDURAL; INFILTRATION; INTRACAUDAL; PERINEURAL
Status: DISCONTINUED | OUTPATIENT
Start: 2022-09-28 | End: 2022-09-28 | Stop reason: HOSPADM

## 2022-09-28 RX ORDER — MIDAZOLAM HYDROCHLORIDE 1 MG/ML
INJECTION INTRAMUSCULAR; INTRAVENOUS PRN
Status: DISCONTINUED | OUTPATIENT
Start: 2022-09-28 | End: 2022-09-28 | Stop reason: SDUPTHER

## 2022-09-28 RX ORDER — SODIUM CHLORIDE 9 MG/ML
INJECTION, SOLUTION INTRAVENOUS PRN
Status: CANCELLED | OUTPATIENT
Start: 2022-09-28

## 2022-09-28 RX ORDER — DEXAMETHASONE SODIUM PHOSPHATE 10 MG/ML
INJECTION, SOLUTION INTRAMUSCULAR; INTRAVENOUS PRN
Status: DISCONTINUED | OUTPATIENT
Start: 2022-09-28 | End: 2022-09-28 | Stop reason: SDUPTHER

## 2022-09-28 RX ORDER — SODIUM CHLORIDE 0.9 % (FLUSH) 0.9 %
5-40 SYRINGE (ML) INJECTION EVERY 12 HOURS SCHEDULED
Status: DISCONTINUED | OUTPATIENT
Start: 2022-09-28 | End: 2022-09-28 | Stop reason: HOSPADM

## 2022-09-28 RX ORDER — ONDANSETRON 2 MG/ML
INJECTION INTRAMUSCULAR; INTRAVENOUS PRN
Status: DISCONTINUED | OUTPATIENT
Start: 2022-09-28 | End: 2022-09-28 | Stop reason: SDUPTHER

## 2022-09-28 RX ORDER — SODIUM CHLORIDE 9 MG/ML
INJECTION, SOLUTION INTRAVENOUS CONTINUOUS
Status: DISCONTINUED | OUTPATIENT
Start: 2022-09-28 | End: 2022-09-28

## 2022-09-28 RX ORDER — DOCUSATE SODIUM 100 MG/1
100 CAPSULE, LIQUID FILLED ORAL 2 TIMES DAILY
Qty: 20 CAPSULE | Refills: 0 | Status: SHIPPED | OUTPATIENT
Start: 2022-09-28

## 2022-09-28 RX ORDER — FENTANYL CITRATE 50 UG/ML
INJECTION, SOLUTION INTRAMUSCULAR; INTRAVENOUS PRN
Status: DISCONTINUED | OUTPATIENT
Start: 2022-09-28 | End: 2022-09-28 | Stop reason: SDUPTHER

## 2022-09-28 RX ORDER — SODIUM CHLORIDE 9 MG/ML
INJECTION, SOLUTION INTRAVENOUS PRN
Status: DISCONTINUED | OUTPATIENT
Start: 2022-09-28 | End: 2022-09-28 | Stop reason: HOSPADM

## 2022-09-28 RX ORDER — SODIUM CHLORIDE 0.9 % (FLUSH) 0.9 %
5-40 SYRINGE (ML) INJECTION PRN
Status: CANCELLED | OUTPATIENT
Start: 2022-09-28

## 2022-09-28 RX ORDER — FENTANYL CITRATE 50 UG/ML
25 INJECTION, SOLUTION INTRAMUSCULAR; INTRAVENOUS EVERY 5 MIN PRN
Status: CANCELLED | OUTPATIENT
Start: 2022-09-28

## 2022-09-28 RX ORDER — SODIUM CHLORIDE 0.9 % (FLUSH) 0.9 %
5-40 SYRINGE (ML) INJECTION PRN
Status: DISCONTINUED | OUTPATIENT
Start: 2022-09-28 | End: 2022-09-28 | Stop reason: HOSPADM

## 2022-09-28 RX ORDER — HYDROCODONE BITARTRATE AND ACETAMINOPHEN 5; 325 MG/1; MG/1
1 TABLET ORAL EVERY 6 HOURS PRN
Qty: 15 TABLET | Refills: 0 | Status: SHIPPED | OUTPATIENT
Start: 2022-09-28 | End: 2022-10-05

## 2022-09-28 RX ORDER — ROCURONIUM BROMIDE 10 MG/ML
INJECTION, SOLUTION INTRAVENOUS PRN
Status: DISCONTINUED | OUTPATIENT
Start: 2022-09-28 | End: 2022-09-28 | Stop reason: SDUPTHER

## 2022-09-28 RX ADMIN — Medication 100 MCG: at 09:10

## 2022-09-28 RX ADMIN — CEFAZOLIN 2000 MG: 10 INJECTION, POWDER, FOR SOLUTION INTRAVENOUS at 07:45

## 2022-09-28 RX ADMIN — Medication 200 MCG: at 09:19

## 2022-09-28 RX ADMIN — SODIUM CHLORIDE, POTASSIUM CHLORIDE, SODIUM LACTATE AND CALCIUM CHLORIDE: 600; 310; 30; 20 INJECTION, SOLUTION INTRAVENOUS at 06:49

## 2022-09-28 RX ADMIN — Medication 100 MCG: at 07:30

## 2022-09-28 RX ADMIN — SODIUM CHLORIDE, POTASSIUM CHLORIDE, SODIUM LACTATE AND CALCIUM CHLORIDE: 600; 310; 30; 20 INJECTION, SOLUTION INTRAVENOUS at 08:23

## 2022-09-28 RX ADMIN — LIDOCAINE HYDROCHLORIDE 100 MG: 20 INJECTION, SOLUTION EPIDURAL; INFILTRATION; INTRACAUDAL; PERINEURAL at 07:30

## 2022-09-28 RX ADMIN — ROCURONIUM BROMIDE 50 MG: 10 INJECTION, SOLUTION INTRAVENOUS at 07:30

## 2022-09-28 RX ADMIN — Medication 50 MCG: at 08:01

## 2022-09-28 RX ADMIN — PROPOFOL 200 MG: 10 INJECTION, EMULSION INTRAVENOUS at 07:30

## 2022-09-28 RX ADMIN — ONDANSETRON 4 MG: 2 INJECTION INTRAMUSCULAR; INTRAVENOUS at 09:38

## 2022-09-28 RX ADMIN — SUGAMMADEX 200 MG: 100 INJECTION, SOLUTION INTRAVENOUS at 09:45

## 2022-09-28 RX ADMIN — DEXAMETHASONE SODIUM PHOSPHATE 10 MG: 10 INJECTION, SOLUTION INTRAMUSCULAR; INTRAVENOUS at 07:40

## 2022-09-28 RX ADMIN — MIDAZOLAM 2 MG: 1 INJECTION INTRAMUSCULAR; INTRAVENOUS at 07:24

## 2022-09-28 RX ADMIN — INSULIN HUMAN 15 UNITS: 100 INJECTION, SOLUTION PARENTERAL at 10:19

## 2022-09-28 RX ADMIN — Medication 100 MCG: at 08:50

## 2022-09-28 ASSESSMENT — ENCOUNTER SYMPTOMS: SHORTNESS OF BREATH: 1

## 2022-09-28 ASSESSMENT — PAIN SCALES - GENERAL
PAINLEVEL_OUTOF10: 0
PAINLEVEL_OUTOF10: 5

## 2022-09-28 NOTE — ANESTHESIA PRE PROCEDURE
Department of Anesthesiology  Preprocedure Note       Name:  Gabi Gardner   Age:  79 y.o.  :  1955                                          MRN:  [id number]         Date:  2022      Surgeon: Rupali Query):  Natasha Chi DO    Procedure: Procedure(s): HERNIA INGUINAL REPAIR LAPAROSCOPIC BILATERAL ROBOTIC XI    Medications prior to admission:   Prior to Admission medications    Medication Sig Start Date End Date Taking? Authorizing Provider   docusate sodium (COLACE) 100 MG capsule Take 1 capsule by mouth 2 times daily 22  Yes Natasha Chi DO   HYDROcodone-acetaminophen (NORCO) 5-325 MG per tablet Take 1 tablet by mouth every 6 hours as needed for Pain for up to 7 days.  9/28/22 10/5/22 Yes Natasha Chi DO   montelukast (SINGULAIR) 10 MG tablet Take 10 mg by mouth nightly    Historical Provider, MD   vitamin B-12 (CYANOCOBALAMIN) 100 MCG tablet Take 50 mcg by mouth daily    Historical Provider, MD   fenofibrate (TRICOR) 145 MG tablet TAKE 1 TABLET DAILY  Patient taking differently: Taking every other day 22   Lea Rasmussen MD   B-D UF III MINI PEN NEEDLES 31G X 5 MM MISC INJECT 1 PEN NEEDLE INTO   THE SKIN DAILY 22   Lea Rasmussen MD   losartan (COZAAR) 50 MG tablet TAKE 1 TABLET DAILY 21   Lea Rasmussen MD   lovastatin (MEVACOR) 20 MG tablet TAKE 1 TABLET NIGHTLY  Patient taking differently: daily TAKE 1 TABLET NIGHTLY 21   Lea Rasmussen MD   ibuprofen (ADVIL;MOTRIN) 600 MG tablet Take 1 tablet by mouth every 6 hours as needed for Pain 20   Ayesha Owusu DO   FARXIGA 5 MG tablet Take 10 mg by mouth every morning  19   Historical Provider, MD   glimepiride (AMARYL) 4 MG tablet Take 4 mg by mouth daily Take 2 tablets once daily 19   Historical Provider, MD Catheline Babinski KWIKPEN 100 UNIT/ML injection pen Inject 62 Units into the skin daily 19   Historical Provider, MD   aspirin 81 MG chewable tablet Take 1 tablet by mouth daily Hold for 5 days after surgery, until 1/16/18  Patient taking differently: Take 81 mg by mouth daily 1/10/18   Hannah Mcdonough MD   VENTOLIN  (90 BASE) MCG/ACT inhaler inhale 2 puff every 4 hours prn  Patient not taking: No sig reported 6/24/16   Historical Provider, MD       Current medications:    Current Facility-Administered Medications   Medication Dose Route Frequency Provider Last Rate Last Admin    lidocaine PF 1 % injection 1 mL  1 mL IntraDERmal Once PRN William Rodrigues MD        lactated ringers infusion   IntraVENous Continuous William Rodrigues  mL/hr at 09/28/22 0649 New Bag at 09/28/22 0649    sodium chloride flush 0.9 % injection 5-40 mL  5-40 mL IntraVENous 2 times per day William Rodrigues MD        sodium chloride flush 0.9 % injection 5-40 mL  5-40 mL IntraVENous PRN William Rodrigues MD        0.9 % sodium chloride infusion   IntraVENous PRN William Rodrigues MD        ceFAZolin (ANCEF) 2000 mg in dextrose 5 % 50 mL IVPB  2,000 mg IntraVENous On Call to [facility]           Allergies:     Allergies   Allergen Reactions    Seasonal        Problem List:    Patient Active Problem List   Diagnosis Code    Neoplasm of unspecified nature of bone, soft tissue, 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    Hearing difficulty H91.90    Renal calculus, left N20.0    Disorder of intervertebral disc of lumbar spine M51.9    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 Dorina Davis. 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 An Cotto PCP    Hypertension     managed by Dr. An Cotto PCP. Does not have a cardiologist    IBS (irritable bowel syndrome)     Kidney stone     multiple times    Left ureteral stone     Neoplasm of unspecified nature of bone, soft tissue, and skin 05/20/2014    lesion of left side of nose    Osteoarthritis     hands    Snores     SOB (shortness of breath)     with exertion    Type II or unspecified type diabetes mellitus without mention of complication, not stated as uncontrolled     managed by Dr. Therese Mortensen Endocrinologist    Ureteral stent retained     in place x 2    Wears glasses        Past Surgical History:        Procedure Laterality Date    BLADDER TUMOR EXCISION  2009    benign    BRONCHOSCOPY      COLONOSCOPY  09/08/2015    2 times with polypectomy- Dr. Iesha Lopez Right 06/17/2020    HOLMIUM LASER, CYSTOSCOPY, URETEROSCOPY, STENT PLACEMENT performed by Cal Carlton MD at [address] [facility] Left 09/21/2015    2 stents in Lt.     CYSTOSCOPY  09/30/2015    EYE SURGERY      cataract surgery both eyes    HERNIA REPAIR Left     inguinal    KNEE ARTHROSCOPY      LITHOTRIPSY Left     x2    LITHOTRIPSY  09/30/2015    laser    LITHOTRIPSY Left 03/05/2021    LITHOTRIPSY Left 3/5/2021    ESWL EXTRACORPOREAL SHOCK WAVE LITHOTRIPSY  (NEX-MED CONF# [id number] - DAISY) performed by Cal Carlton MD at [address] [place] [address] SURGERY Right 05/23/2011    right lower lobe \"lung carcinoid\" removal. Dr. Endy Horne ESWL Left 01/10/2018    ESWL EXTRACORPEAL SHOCK WAVE LITHOTRIPSY, POSSIBLE  STENT PLACEMENT (NEX MED CONF# [id number]) performed by Henok Chavira MD at [address] [place] / [address] Left 06/09/2014    Excision lesion of nose. Dr. Mauricio Deleon.     SHOULDER SURGERY Right     closed manipulation    SHOULDER SURGERY Left     open manipulation    SKIN BIOPSY  11/12/2007    meir acilla and neck--squamous papillomas--lft buttock--subcutaneous abscess, back--compound nevus, lft neck--lentigo simplex, lft chest --junctional nevus, rt forearm--blue nevus    SKIN BIOPSY  12/09/2009    lft thigh--follicular cyst    SKIN BIOPSY  02/10/2010    rt thigh--ruptured epidermal inclusion cyst    SKIN BIOPSY  09/29/2021     BACK LESION BIOPSY EXCISION (N/A Back    SKIN BIOPSY N/A 9/29/2021    BACK LESION BIOPSY EXCISION performed by Cordell Castorena MD at [address]      as a child   [address] Left 09/30/2015    tiems 2 stents       Social History:    Social History     Tobacco Use    Smoking status: Never    Smokeless tobacco: Never   Substance Use Topics    Alcohol use: Yes     Comment: once a week                                Counseling given: Not Answered      Vital Signs (Current):   Vitals:    09/28/22 0623   BP: (!) 155/73   Pulse: 73   Resp: 14   Temp: 97.4 °F (36.3 °C)   TempSrc: Temporal   SpO2: 96%   Weight: 229 lb 6.4 oz (104.1 kg)   Height: 6' (1.829 m)                                              BP Readings from Last 3 Encounters:   09/28/22 (!) 155/73   09/13/22 (!) 153/69   08/22/22 110/72       NPO Status: Time of last liquid consumption: 2100                        Time of last solid consumption: 2100                        Date of last liquid consumption: 09/27/22                        Date of last solid food consumption: 09/27/22    BMI:   Wt Readings from Last 3 Encounters:   09/28/22 229 lb 6.4 oz (104.1 kg)   09/13/22 229 lb 6.4 oz (104.1 kg)   08/22/22 221 lb (100.2 kg)     Body mass index is 31.11 kg/m².     CBC:   Lab Results   Component Value Date/Time    WBC 7.5 09/13/2022 09:24 AM    RBC 5.24 09/13/2022 09:24 AM    RBC 5.33 06/07/2012 06:20 PM    HGB 16.0 09/13/2022 09:24 AM    HCT 46.1 09/13/2022 09:24 AM    MCV 88.0 09/13/2022 09:24 AM    RDW 12.2 09/13/2022 09:24 AM     09/13/2022 09:24 AM     06/07/2012 06:20 PM       CMP:   Lab Results   Component Value Date/Time     09/13/2022 09:24 AM    K 4.3 09/13/2022 09:24 AM    CL 99 09/13/2022 09:24 AM    CO2 23 09/13/2022 09:24 AM    BUN 16 09/13/2022 09:24 AM    CREATININE 0.92 09/13/2022 09:24 AM    GFRAA >60 09/13/2022 09:24 AM    LABGLOM >60 09/13/2022 09:24 AM    GLUCOSE 349 09/13/2022 09:24 AM    PROT 8.0 04/04/2022 08:16 AM    CALCIUM 9.4 09/13/2022 09:24 AM    BILITOT 0.63 04/04/2022 08:16 AM    ALKPHOS 46 04/04/2022 08:16 AM    AST 22 04/04/2022 08:16 AM    ALT 31 04/04/2022 08:16 AM       POC Tests:   Recent Labs     09/28/22  0638   POCGLU 238*       Coags: No results found for: PROTIME, INR, APTT    HCG (If Applicable): No results found for: PREGTESTUR, PREGSERUM, HCG, HCGQUANT     ABGs: No results found for: PHART, PO2ART, KFJ9MYQ, TXE3YJF, BEART, C0EGRCNG     Type & Screen (If Applicable):  No results found for: LABABO, LABRH    Drug/Infectious Status (If Applicable):  No results found for: HIV, HEPCAB    COVID-19 Screening (If Applicable):   Lab Results   Component Value Date/Time    COVID19 Not Detected 03/01/2021 05:32 AM           Anesthesia Evaluation  Patient summary reviewed and Nursing notes reviewed no history of anesthetic complications:   Airway: Mallampati: II  TM distance: >3 FB   Neck ROM: full  Mouth opening: > = 3 FB   Dental: normal exam         Pulmonary:normal exam    (+) shortness of breath:  asthma: Cardiovascular:  Exercise tolerance: no interval change,   (+) hypertension:,     (-) past MI, CAD and CABG/stent        Rate: normal                    Neuro/Psych:               GI/Hepatic/Renal:   (+) GERD:, liver disease:,           Endo/Other:    (+) Diabetes, . Abdominal:             Vascular: Other Findings:           Anesthesia Plan      general     ASA 3       Induction: intravenous. MIPS: Prophylactic antiemetics administered. Anesthetic plan and risks discussed with patient. Plan discussed with CRNA.     Attending anesthesiologist reviewed and agrees with Preprocedure content                Jorge Ambrocio DO   9/28/2022

## 2022-09-28 NOTE — OP NOTE
Operative Note      Patient: Naya Castillo  YOB: 1955  MRN: [id number]    Date of Procedure: 9/28/2022    Pre-Op Diagnosis: Bilateral inguinal hernia without obstruction or gangrene, recurrence not specified [K40.20]    Post-Op Diagnosis:   Right direct inguinal hernia  Small cord lipoma on right  Large left cord lipoma  Previously placed mesh covering the left direct space       Procedure(s): HERNIA INGUINAL REPAIR LAPAROSCOPIC BILATERAL ROBOTIC XI    Surgeon(s):  Samantha Boyer DO    Assistant:   First Assistant: Darrel Borrero RN    Anesthesia: General    Estimated Blood Loss (mL): Minimal    Complications: None    Specimens:   * No specimens in log *    Implants:  Implant Name Type Inv. Item Serial No.  Lot No. LRB No. Used Action   MESH SURG W3.5XL6IN POLY SELF FIXATING RECT W/ RESRB CALI - [id number]  MESH SURG W3.5XL6IN POLY SELF FIXATING RECT W/ RESRB CALI  [employer] US [employer]-WD [id number] Left 1 Implanted   MESH SURG W3.5XL6IN POLY SELF FIXATING RECT W/ RESRB CALI - [id number]  MESH SURG W3.5XL6IN POLY SELF FIXATING RECT W/ RESRB CALI  [employer] US SURGICAL-WD [id number] Right 1 Implanted         Drains: * No LDAs found *    Findings: as above wound class 1    Detailed Description of Procedure:       HISTORY: The patient is a 79y.o. year old male with history of above preop diagnosis. The risk, benefits, expected outcome, and alternatives to the procedure were explained to the patient's understanding and written informed consent was obtained. DESCRIPTION OF PROCEDURE:  Patient was brought to the operating suite and placed on the operating table in supine position. Timeout was performed verifying correct patient, position, equipment and procedure to be performed. EPC cuffs were applied and preoperative antibiotics were infused. General anesthesia was administered and the patient was endotracheally intubated.  The patient's abdomen including genitalia was prepped and draped in the usual sterile fashion. 1cm incision was made in the left upper quadrant at Rodriguez's point and Veress needle was inserted. A saline drop test was used to confirm entrance into the abdomen. Pneumoperitoneum was created with insufflation of carbon dioxide to 15 mmHg. An 8 mmHg port was placed in the RUQ abdomen using an Optiview obturator with no damage to the underlying structures. Remaining ports were placed under direct visualization along the same line in the midline abdomen and the LUQ abdomen. Robot was docked without complication. A 30 degree scope was placed into the abdomen. Both inguinal regions were inspected and the median umbilical ligament, medial umbilical ligaments, and lateral umbilical folds were identified. Bilaterally, peritoneum was incised with scissor electrocautery along a line 2 cm above the ASIS to the medial umbilical ligament. The peritoneal flap was mobilized inferiorly using blunt dissection, this extended from the lateral edge of the internal ring to the exposing the medial and inferior aspects of the pubic tubercle. The indirect, direct and femoral hernia spaces were visualized. Hernias were found in the right direct space, large bolus of preperitoneal fat was reduced from this space without issue. Cord lipoma of the right side was identified and dissected from the cord structures. On the left side, large cord lipoma was meticulously freed from its surrounding structures. I identified the previously placed mesh covering the direct space, this was well scarred into the surrounding tissues but able to be freed from the peritoneal lining.      Once dissection was completed, bilaterally 15 cm x 9 cm piece Progrip mesh was rolled double scroll and placed within the peritoneum flap and centered over the internal inguinal ring to cover the indirect, direct and femoral hernia spaces, this mesh also overlaps the pubic tubercle by 2cm medially and inferiorly. It was noted to lay flat, in good position and without crimpage. The peritoneum was closed with running absorbable 3-0 V lock suture. Robot was undocked without complications. The 8mm robotic ports were removed under direct visualization. No bleeding was noted. Skin incisions were closed with subcuticular 4-0 Monocryl suture. Combination of 1% lidocaine and 0.25% marcaine without epinephrine was used to anesthetize the skin incisions. Incisions were covered with skin glue. Patient tolerated the procedure well. There were no complications. All instruments and sponges were accounted for. Bilateral testes were present in the scrotum at the end of the case. Patient was extubated and taken to recovery room in stable condition.          Electronically signed by Axel Ospina DO on 9/28/2022 at 12:04 PM

## 2022-09-28 NOTE — INTERVAL H&P NOTE
Interval H&P Note    Pt Name: Glory Chin  MRN: [id number]  YOB: 1955  Date of evaluation: 9/28/2022      [x] I have reviewed the H&P by BALJIT Shirley CNP dated 9/13/22 for the Interval History and Physical note. [x] I have examined  Jose Guadalupe Sharpe  There are no changes to the patient who is scheduled for HERNIA INGUINAL REPAIR LAPAROSCOPIC BILATERAL ROBOTIC XI by Bertha Restrepo DO for Bilateral inguinal hernia without obstruction or gangrene, recurrence not specified [K40.20]. Hx Asthma has not used his Ventolin or Singulair for past several months The patient denies new health changes, fever, chills, wheezing, cough, increased SOB, chest pain, open sores or wounds. +DM POC  Doesn't check sugars at home Last A1C 8.0 9/13/22    Last ASA 81mg week ago      Vital signs: BP (!) 155/73   Pulse 73   Temp (!) 96.4 °F (35.8 °C) (Temporal)   Resp 14   SpO2 96%      Allergies:  Seasonal    Medications:    Prior to Admission medications    Medication Sig Start Date End Date Taking?  Authorizing Provider   montelukast (SINGULAIR) 10 MG tablet Take 10 mg by mouth nightly    Historical Provider, MD   vitamin B-12 (CYANOCOBALAMIN) 100 MCG tablet Take 50 mcg by mouth daily    Historical Provider, MD   fenofibrate (TRICOR) 145 MG tablet TAKE 1 TABLET DAILY  Patient taking differently: Taking every other day 9/2/22   Nena Perez MD   B-D UF III MINI PEN NEEDLES 31G X 5 MM MISC INJECT 1 PEN NEEDLE INTO   THE SKIN DAILY 5/27/22   Nena Perez MD   losartan (COZAAR) 50 MG tablet TAKE 1 TABLET DAILY 12/17/21   Nena Perez MD   lovastatin (MEVACOR) 20 MG tablet TAKE 1 TABLET NIGHTLY  Patient taking differently: daily TAKE 1 TABLET NIGHTLY 12/2/21   Nena Perez MD   ibuprofen (ADVIL;MOTRIN) 600 MG tablet Take 1 tablet by mouth every 6 hours as needed for Pain 6/14/20   Yusuf January, DO   FARXIGA 5 MG tablet Take 10 mg by mouth every morning  4/21/19   Historical Provider, MD   glimepiride (AMARYL)

## 2022-09-28 NOTE — DISCHARGE INSTRUCTIONS
Patient Discharge Instructions  Discharge Date:  9/28/2022    HYGEINE: Elias Francisco to shower 09/29/22, no soaking in a tub/pool until seen at your follow up appointment. Clean incision daily with gentle soap and water, do not use alcohol/peroxide or any harsh cleansers. DRIVING: No driving while taking narcotic medications or while in pain    ACTIVITY: No lifting greater than 20lbs for 6 weeks or any strenuous activity. DIET: Resume your normal diet as advised by your PCP. MEDICATIONS: Take all medications as prescribed. Take Colace until you have your first bowel movement, continue to take if you are using narcotics for pain control    SPECIAL INSTRUCTIONS:     Your scrotum may appear larger than normal after surgery, this is due to the gas (carbon dioxide) that was used in order to repair the hernia. This gas will slowly be reabsorbed by the body over the next several days. You may experience constipation for several days after surgery. The medications used for your anesthesia can have a constipating effect, the constipation is made worse by narcotic medications. Continue to eat normally and take the stool softener as prescribed, it is okay to use an over-the-counter laxative as well if you have not had a bowel movement after 2-3 days post surgery. If you have not been able to urinate within 4-6 hours since the completion of surgery then return to the ED for evaluation of your bladder  Follow up with Dr. Shraddha Chaney in 10-14 days, call the clinic for appointment. Call sooner if fever above 100.4 degrees Farenheit, increase in swelling or redness of the groin or incision sites, or pain not controlled with medications. You are being sent home with a prescription for opioid pain medication. This medication was prescribed by your physician. Be sure to follow the instructions below. Follow instructions as written on the bottle and information sheets provided by your pharmacy.    It is recommended to wean off these pain medication as soon as possible. These medications can have side effects including, but not limited to the following: nausea, vomiting, confusion, sedation, constipation, and itching. Be sure to report these side effects to your physician. Taking medications with food may help with nausea. All medications should be stored in a safe place at home. Safety caps should remain in place, keep out of reach of children, do not share with family or friends. Unused opioid pain medications, should be disposed of in a safe place. There are multiple safe deposit sites, including the lobby at [facility], where unused medications can be disposed of. Do not drive or operate equipment that requires judgement. Do not drink alcohol while taking these pain medications. In addition to these pain medications, your physician may have prescribed additional pain relieving therapy such as heat, cold, range of motion exercises, repositioning, elevation, splint, sling, or limited activity at home. Be sure to follow your physicians instructions. If your pain is not controlled with these medications, call your physician.

## 2022-09-28 NOTE — DISCHARGE INSTRUCTIONS
Pre-operative Instructions    Please arrive at the surgery center by 5:45 AM on 10/14/2022  (or as directed by your surgeon's office). See Directons to Surgery Center below. FASTING    NOTHING TO EAT OR DRINK AFTER MIDNIGHT the night prior to surgery (This includes gum, candy, mints, chewing tobacco, etc). MEDICATIONS    What to STOP: ANY BLOOD THINNING MEDICATION(S) as directed by your surgeon or prescribing physician. FAILURE TO STOP CERTAIN MEDICATIONS MAY INTERFERE WITH YOUR SCHEDULED SURGERY. According to the medication list you provided today, PLEASE STOP: aspirin, ibuprofen (Motrin)    2. What to CONTINUE leading up to your surgery:   Please take all your other daily medications except the medications listed above that you were instructed to hold. 3. What to [place] with SMALL SIP OF WATER: losartan (Cozaar), and if needed: norco                       IF APPLICABLE:  -If you have been given a blood band, you must bring it with you the day of surgery, unclasped.  -Use routine inhalers and bring inhalers the day of surgery.   -Bring C-Pap/Bi-pap with you morning of surgery if planning on staying in the hospital overnight.  -Do not take diabetic medications on the day of surgery. OTHER IMPORTANT REMINDERS    1) Please REMEMBER to get Covid-19 Screening if scheduled    2) You may be required to provide a urine sample upon your arrival to the pre-op area, so please take this into consideration. 3) If  NOT planning on staying in the hospital overnight : A. You will need an adult family member /friend to drive you home after your procedure. Taxi cabs or any form of public transportation ALONE is not acceptable.   -Your  must be 25years of age or older and able to sign off on your discharge instructions.     -It is preferable that the friend or family member stay at the hospital throughout your procedure.   Shannon Sanchez must remain with you once you have arrived home for the first 24 hours after your surgery if you receive anesthesia or medication. If you do not have someone to stay with you, your procedure may be cancelled. 4) Do not wear any jewelry or body piercings day of surgery. 5) In case of illness - If you have cold or flu like symptoms (high fever, runny nose, sore throat, cough, etc.) rash, nausea, vomiting, loose stools, and/or recent contact with someone who has a contagious disease (Covid-19, chicken pox, measles, etc.) PLEASE notify your surgeon as soon as possible. 9/28/22  12:05 PM      ___________________  _______________________  Signature (Provider)              Signature (Patient)     Day of Surgery/Procedure    As a patient at [facility] you can expect quality medical and nursing care that is centered on your individual needs. Our goal is to make your surgical experience as comfortable as possible  . Directions to the [address] [place] [address] is located at [address] [facility] [place], [address] [facility]. Please pull into the Emergency/Surgery Center parking lot or there is additional parking across the street. You will enter the facility under through the glass doors and proceed to registration check-in which is right inside the door. Thereafter you will be directed to the [address] [facility]. Patient Instructions    ·Please shower the night before and the morning of surgery with an antibacterial soap. Please use the cleaning solution (bottle) given to you the night before your surgery after your shower. Unless otherwise told by your physician, please do not shave legs or any part of your body below your neck the night before or day of your surgery. You may shave your face or neck. ·Please wear loose, comfortable clothing. If you are potentially going to have a cast or brace bring clothing that will fit over them.       ·Bring a list of all medications you take, along with the dose of the medications and how often you take it. If more convenient bring the pharmacy bottles in a zip lock bag. ·Brush your teeth but do not swallow water. ·Bring your eyeglasses and case with you. No contacts are to be worn the day of surgery. You also may bring your hearing aids. ·Do not bring any valuables, such as jewelry, cash or credit cards. If you are staying overnight with us, please bring a SMALL bag of personal items. We cannot accommodate large items, like suitcases. ·If your child is having surgery please make arrangements for any other children to be cared for at home on the day of surgery. Other children are not permitted in recovery room and we want you to be able to spend time with the patient. If other arrangements are not available then we suggest that you have a second adult to stay in the waiting room. ·If you are having any type of anesthesia you are to have nothing to eat or drink after midnight the night before your surgery. This includes gum, mints, water or smoking or chewing tobacco.  The only exception to this is a small sip of water to take with any morning dose of heart, blood pressure, or seizure medications. ·Bring your inhaler if you are currently using one. ·Bring your blood band if one has been given to you. Please do not close the clasp. ·If you are on C-PAP or Bi-PAP at home and plan on staying in the hospital overnight for your surgery please bring the machine with you. ·Do not wear any jewelry or body piercings day of surgery. Also, NO lotion, perfume or deodorant to be used the day of surgery. If you have any other questions regarding your procedure/surgery please call  your surgeon's office.      If you have a last minute question(s) the DAY OF your surgery, you may call [phone]

## 2022-09-28 NOTE — ANESTHESIA POSTPROCEDURE EVALUATION
Department of Anesthesiology  Postprocedure Note    Patient: Jr Wright  MRN: [id number]  YOB: 1955  Date of evaluation: 9/28/2022      Procedure Summary     Date: 09/28/22 Room / Location: [address] [place] [address]    Anesthesia Start: 0174 Anesthesia Stop: 1000    Procedure: HERNIA INGUINAL REPAIR LAPAROSCOPIC BILATERAL ROBOTIC XI (Bilateral: Abdomen) Diagnosis:       Bilateral inguinal hernia without obstruction or gangrene, recurrence not specified      (Bilateral inguinal hernia without obstruction or gangrene, recurrence not specified [K40.20])    Surgeons: Kunal Huff DO Responsible Provider: Tristan Tovar DO    Anesthesia Type: general ASA Status: 3          Anesthesia Type: No value filed.     Jaguar Phase I: Jaguar Score: 5    Jaguar Phase II:        Anesthesia Post Evaluation    Patient location during evaluation: PACU  Patient participation: complete - patient participated  Level of consciousness: awake and alert  Airway patency: patent  Nausea & Vomiting: no nausea and no vomiting  Complications: no  Cardiovascular status: hemodynamically stable  Respiratory status: acceptable  Hydration status: stable

## 2022-09-30 ENCOUNTER — HOSPITAL ENCOUNTER (OUTPATIENT)
Dept: PREADMISSION TESTING | Age: 67
Discharge: HOME | End: 2022-09-30

## 2022-09-30 NOTE — DISCHARGE INSTRUCTIONS
Pre-operative Instructions    Please arrive at the surgery center by 5:45 AM on 10/14/2022  (or as directed by your surgeon's office). See Directons to Surgery Center below. FASTING    NOTHING TO EAT OR DRINK AFTER MIDNIGHT the night prior to surgery (This includes gum, candy, mints, chewing tobacco)    (Follow bowel prep instructions if instructed by your surgeon.)                MEDICATIONS    What to STOP: ANY BLOOD THINNING MEDICATION(S) as directed by your surgeon or prescribing physician. FAILURE TO STOP CERTAIN MEDICATIONS MAY INTERFERE WITH YOUR SCHEDULED SURGERY. According to the medication list you provided today, PLEASE STOP: aspirin, ibuprofen (Motrin)    2. What to CONTINUE leading up to your surgery:   Please take all your other daily medications except the medications listed above that you were instructed to hold. 3. What to [place] with SMALL SIP OF WATER: losartan (Cozaar) and if needed: norco                       IF APPLICABLE:  -If you have been given a blood band, you must bring it with you the day of surgery, unclasped.  -Use routine inhalers and bring inhalers the day of surgery.   -Bring C-Pap/Bi-pap with you morning of surgery if planning on staying in the hospital overnight.  -Do not take diabetic medications on the day of surgery. OTHER IMPORTANT REMINDERS    1) Please REMEMBER to get Covid-19 Screening if scheduled    2) You may be required to provide a urine sample upon your arrival to the pre-op area, so please take this into consideration. 3) If  NOT planning on staying in the hospital overnight : A. You will need an adult family member /friend to drive you home after your procedure.  Taxi cabs or any form of public transportation ALONE is not acceptable.   -Your  must be 25years of age or older and able to sign off on your discharge instructions.     -It is preferable that the friend or family member stay at the hospital throughout your procedure. Uche Chavira must remain with you once you have arrived home for the first 24 hours after your surgery if you receive anesthesia or medication. If you do not have someone to stay with you, your procedure may be cancelled. 4) Do not wear any jewelry or body piercings day of surgery. 5) In case of illness - If you have cold or flu like symptoms (high fever, runny nose, sore throat, cough, etc.) rash, nausea, vomiting, loose stools, and/or recent contact with someone who has a contagious disease (Covid-19, chicken pox, measles, etc.) PLEASE notify your surgeon as soon as possible. 9/30/22  2:39 PM      ___________________  _______________________  Signature (Provider)              Signature (Patient)     Day of Surgery/Procedure    As a patient at [address] [facility] you can expect quality medical and nursing care that is centered on your individual needs. Our goal is to make your surgical experience as comfortable as possible  . Directions to the [address] [place] [address] is located at [address] [place], [address] Please pull into the Emergency/Surgery Center parking lot or there is additional parking across the street. You will enter the facility under through the glass doors and proceed to registration check-in which is right inside the door. Thereafter you will be directed to the [address] [place] [address]. Patient Instructions    ·Please shower the night before and the morning of surgery with an antibacterial soap. Please use the cleaning solution (bottle) given to you the night before your surgery after your shower. Unless otherwise told by your physician, please do not shave legs or any part of your body below your neck the night before or day of your surgery. You may shave your face or neck. ·Please wear loose, comfortable clothing.   If you are potentially going to have a cast or brace bring clothing that will fit over them. ·Bring a list of all medications you take, along with the dose of the medications and how often you take it. If more convenient bring the pharmacy bottles in a zip lock bag. ·Brush your teeth but do not swallow water. ·Bring your eyeglasses and case with you. No contacts are to be worn the day of surgery. You also may bring your hearing aids. ·Do not bring any valuables, such as jewelry, cash or credit cards. If you are staying overnight with us, please bring a SMALL bag of personal items. We cannot accommodate large items, like suitcases. ·If your child is having surgery please make arrangements for any other children to be cared for at home on the day of surgery. Other children are not permitted in recovery room and we want you to be able to spend time with the patient. If other arrangements are not available then we suggest that you have a second adult to stay in the waiting room. ·If you are having any type of anesthesia you are to have nothing to eat or drink after midnight the night before your surgery. This includes gum, mints, water or smoking or chewing tobacco.  The only exception to this is a small sip of water to take with any morning dose of heart, blood pressure, or seizure medications. ·Bring your inhaler if you are currently using one. ·Bring your blood band if one has been given to you. Please do not close the clasp. ·If you are on C-PAP or Bi-PAP at home and plan on staying in the hospital overnight for your surgery please bring the machine with you. ·Do not wear any jewelry or body piercings day of surgery. Also, NO lotion, perfume or deodorant to be used the day of surgery. If you have any other questions regarding your procedure/surgery please call  your surgeon's office.      If you have a last minute question(s) the DAY OF your surgery, you may call [phone]

## 2022-10-04 ENCOUNTER — HOSPITAL ENCOUNTER (OUTPATIENT)
Dept: PREADMISSION TESTING | Age: 67
Discharge: HOME | End: 2022-10-08
Payer: MEDICARE

## 2022-10-04 VITALS
WEIGHT: 224 LBS | SYSTOLIC BLOOD PRESSURE: 156 MMHG | RESPIRATION RATE: 20 BRPM | OXYGEN SATURATION: 97 % | DIASTOLIC BLOOD PRESSURE: 81 MMHG | BODY MASS INDEX: 30.34 KG/M2 | HEIGHT: 72 IN | HEART RATE: 91 BPM | TEMPERATURE: 94.6 F

## 2022-10-04 LAB
ANION GAP SERPL CALCULATED.3IONS-SCNC: 18 MMOL/L (ref 9–17)
BUN BLDV-MCNC: 18 MG/DL (ref 8–23)
CHLORIDE BLD-SCNC: 101 MMOL/L (ref 98–107)
CO2: 21 MMOL/L (ref 20–31)
CREAT SERPL-MCNC: 0.99 MG/DL (ref 0.7–1.2)
GFR SERPL CREATININE-BSD FRML MDRD: >60 ML/MIN/1.73M2
GLUCOSE BLD-MCNC: 320 MG/DL (ref 70–99)
POTASSIUM SERPL-SCNC: 4.4 MMOL/L (ref 3.7–5.3)
SODIUM BLD-SCNC: 140 MMOL/L (ref 135–144)

## 2022-10-04 PROCEDURE — 36415 COLL VENOUS BLD VENIPUNCTURE: CPT

## 2022-10-04 PROCEDURE — 82565 ASSAY OF CREATININE: CPT

## 2022-10-04 PROCEDURE — 87086 URINE CULTURE/COLONY COUNT: CPT

## 2022-10-04 PROCEDURE — 82947 ASSAY GLUCOSE BLOOD QUANT: CPT

## 2022-10-04 PROCEDURE — 80051 ELECTROLYTE PANEL: CPT

## 2022-10-04 PROCEDURE — 84520 ASSAY OF UREA NITROGEN: CPT

## 2022-10-04 NOTE — H&P (VIEW-ONLY)
History and Physical    Pt Name: Leandro Lobato  MRN: [id number]  YOB: 1955  Date of evaluation: 10/4/2022  Primary Care Physician: Wilfredo Carrera MD    SUBJECTIVE:   History of Chief Complaint:    Leandro Lobato is a 79 y.o. male who presents for PAT appointment. Patient states he was had an MRI to check his hernia and was found to have left kidney abnormality as well as kidney stones. Patient denies any symptoms and states he had his inguinal hernia repair completed 9/28/2022. He states he has some abdominal tenderness since surgery but otherwise doing well. Patient has been scheduled for HOLMIUM, CYSTOSCOPY, URETEROSCOPY, STENT PLACEMENT, POSSIBLE RENAL PELVIC BIOPSY, - Left  Allergies  is allergic to seasonal.  Medications  Prior to Admission medications    Medication Sig Start Date End Date Taking? Authorizing Provider   docusate sodium (COLACE) 100 MG capsule Take 1 capsule by mouth 2 times daily 9/28/22   Liss Cuenca DO   HYDROcodone-acetaminophen (NORCO) 5-325 MG per tablet Take 1 tablet by mouth every 6 hours as needed for Pain for up to 7 days.   Patient not taking: Reported on 10/4/2022 9/28/22 10/5/22  Liss Cuenca DO   montelukast (SINGULAIR) 10 MG tablet Take 10 mg by mouth nightly  Patient not taking: Reported on 10/4/2022    Historical Provider, MD   vitamin B-12 (CYANOCOBALAMIN) 100 MCG tablet Take 50 mcg by mouth daily    Historical Provider, MD   fenofibrate (TRICOR) 145 MG tablet TAKE 1 TABLET DAILY  Patient taking differently: daily 9/2/22   Wilfredo Carrera MD   B-D UF III MINI PEN NEEDLES 31G X 5 MM MISC INJECT 1 PEN NEEDLE INTO   THE SKIN DAILY 5/27/22   Wilfredo Carrera MD   losartan (COZAAR) 50 MG tablet TAKE 1 TABLET DAILY 12/17/21   Wilfredo Carrera MD   lovastatin (MEVACOR) 20 MG tablet TAKE 1 TABLET NIGHTLY  Patient taking differently: daily TAKE 1 TABLET NIGHTLY 12/2/21   Wilfredo Carrear MD   ibuprofen (ADVIL;MOTRIN) 600 MG tablet Take 1 tablet by mouth every 6 hours as needed for Pain  Patient taking differently: Take 600 mg by mouth every 6 hours as needed for Pain Stop 7 days prior to sx . 6/14/20   Jessica Jones DO   FARXIGA 5 MG tablet Take 10 mg by mouth every morning  4/21/19   Historical Provider, MD   glimepiride (AMARYL) 4 MG tablet Take 4 mg by mouth daily Take 2 tablets once daily 4/27/19   Historical Provider, MD   [facility] 100 UNIT/ML injection pen Inject 62 Units into the skin daily 6/2/19   Historical Provider, MD   aspirin 81 MG chewable tablet Take 1 tablet by mouth daily Hold for 5 days after surgery, until 1/16/18  Patient taking differently: Take 81 mg by mouth daily Stop 7 days prior to sx per Dr. Mena Been 1/10/18   aIn Chun MD   VENTOLIN  (90 BASE) MCG/ACT inhaler inhale 2 puff every 4 hours prn  Patient not taking: No sig reported 6/24/16   Historical Provider, MD     Past Medical History    has a past medical history of Asthma, Benign bladder tumor, Bilateral hearing loss, BPH (benign prostatic hyperplasia), Cancer ([facility] [address] Carcinoid tumor of lung, Cataract, GERD (gastroesophageal reflux disease), H/O arthroscopy of left knee, H/O seasonal allergies, Hearing aid worn, Hematuria, Hx of lithotripsy, Hyperlipidemia, Hypertension, IBS (irritable bowel syndrome), Kidney stone, Left ureteral stone, Neoplasm of unspecified nature of bone, soft tissue, and skin, Osteoarthritis, Snores, SOB (shortness of breath), Type II or unspecified type diabetes mellitus without mention of complication, not stated as uncontrolled, Under care of team, Under care of team, Under care of team, Ureteral stent retained, and Wears glasses. Past Surgical History   has a past surgical history that includes bladder tumor excision (2009); Lung surgery (Right, 05/23/2011); pre-malignant / benign skin lesion excision (Left, 06/09/2014); shoulder surgery (Right); shoulder surgery (Left); Cystoscopy; bronchoscopy; Knee arthroscopy; Cystoscopy (Left, 09/21/2015);  Cystoscopy (2015); Ureter stent placement (Left, 2015); Lithotripsy (2015); pr fragment kidney stone/ eswl (Left, 01/10/2018); cysto/uretero/pyeloscopy, calculus tx (Right, 2020); Colonoscopy (2015); hernia repair (Left); Tonsillectomy; eye surgery; Lithotripsy (Left, 2021); skin biopsy (2007); skin biopsy (2009); skin biopsy (02/10/2010); skin biopsy (2021); skin biopsy (N/A, 2021); and Inguinal hernia repair (Bilateral, 2022). Social History   reports that he has never smoked. He has never used smokeless tobacco.    reports current alcohol use. reports no history of drug use. Marital Status    Family History  Family Status   Relation Name Status    Mother      Father      Sister  Alive    MGM  (Not Specified)    Brother  Alive     family history includes Diabetes in his brother, maternal grandmother, mother, and sister; Heart Disease in his father and mother. Review of Systems:  CONSTITUTIONAL:   negative for fevers, chills, fatigue and malaise    EYES:   negative for double vision, blurred vision and photophobia    HEENT:   negative for tinnitus, epistaxis and sore throat     RESPIRATORY:   negative for cough, shortness of breath, wheezing     CARDIOVASCULAR:   negative for chest pain, palpitations, syncope, edema     GASTROINTESTINAL:   negative for nausea, vomiting abdominal tenderness   GENITOURINARY:   negative for incontinence     MUSCULOSKELETAL:   negative for neck or back pain     NEUROLOGICAL:   Negative for weakness and tingling  negative for headaches and dizziness     PSYCHIATRIC:   negative for anxiety       OBJECTIVE:   VITALS:  height is 6' (1.829 m) and weight is 224 lb (101.6 kg). His temporal temperature is 94.6 °F (34.8 °C) (abnormal). His blood pressure is 156/81 (abnormal) and his pulse is 91. His respiration is 20 and oxygen saturation is 97%. CONSTITUTIONAL:alert & oriented x 3, no acute distress.  Calm and pleasant. SKIN:  Warm and dry, no rashes to exposed areas of skin. HEAD:  Normocephalic, atraumatic. EYES: PERRL. EOMs intact. Wearing glasses. EARS:  Intact and equal bilaterally. Hearing loss bilaterally; wearing aides. NOSE:  Nares patent. No rhinorrhea   MOUTH/THROAT:  Mucous membranes pink and moist, teeth appear to be intact. NECK:supple, good ROM. LUNGS: Respirations even and non-labored. Clear to auscultation bilaterally, no wheezes, rales, or rhonchi. CARDIOVASCULAR: Regular rate and rhythm, no murmurs. ABDOMEN: soft, generalized tenderness near surgical sites; non-distended, bowel sounds active x 4. 3 surgical sites are closed, nondraining, pink. EXTREMITIES: No edema to bilateral lower extremities. No varicosities to bilateral lower extremities. NEUROLOGIC: CN II-XII are grossly intact. Gait is smooth. Testing:   EKG: 10/4/2022  Labs pending: drawn 10/4/2022   IMPRESSIONS:   Kidney stones, benign neoplasm of left renal mass.    PLANS:   HOLMIUM, CYSTOSCOPY, URETEROSCOPY, STENT PLACEMENT, POSSIBLE RENAL PELVIC BIOPSY, - Left    HAMILTON Middleton - ELLIE  Electronically signed 10/4/2022 at 3:07 PM

## 2022-10-04 NOTE — H&P
History and Physical    Pt Name: Evelia Royal  MRN: [id number]  YOB: 1955  Date of evaluation: 10/4/2022  Primary Care Physician: Asif Mendez MD    SUBJECTIVE:   History of Chief Complaint:    Evelia Royal is a 79 y.o. male who presents for PAT appointment. Patient states he was had an MRI to check his hernia and was found to have left kidney abnormality as well as kidney stones. Patient denies any symptoms and states he had his inguinal hernia repair completed 9/28/2022. He states he has some abdominal tenderness since surgery but otherwise doing well. Patient has been scheduled for HOLMIUM, CYSTOSCOPY, URETEROSCOPY, STENT PLACEMENT, POSSIBLE RENAL PELVIC BIOPSY, - Left  Allergies  is allergic to seasonal.  Medications  Prior to Admission medications    Medication Sig Start Date End Date Taking? Authorizing Provider   docusate sodium (COLACE) 100 MG capsule Take 1 capsule by mouth 2 times daily 9/28/22   Aline Ahumada DO   HYDROcodone-acetaminophen (NORCO) 5-325 MG per tablet Take 1 tablet by mouth every 6 hours as needed for Pain for up to 7 days.   Patient not taking: Reported on 10/4/2022 9/28/22 10/5/22  Aline Ahumada DO   montelukast (SINGULAIR) 10 MG tablet Take 10 mg by mouth nightly  Patient not taking: Reported on 10/4/2022    Historical Provider, MD   vitamin B-12 (CYANOCOBALAMIN) 100 MCG tablet Take 50 mcg by mouth daily    Historical Provider, MD   fenofibrate (TRICOR) 145 MG tablet TAKE 1 TABLET DAILY  Patient taking differently: daily 9/2/22   Asif Mendez MD   B-D UF III MINI PEN NEEDLES 31G X 5 MM MISC INJECT 1 PEN NEEDLE INTO   THE SKIN DAILY 5/27/22   Asif Mendez MD   losartan (COZAAR) 50 MG tablet TAKE 1 TABLET DAILY 12/17/21   Asif Mendez MD   lovastatin (MEVACOR) 20 MG tablet TAKE 1 TABLET NIGHTLY  Patient taking differently: daily TAKE 1 TABLET NIGHTLY 12/2/21   Asif Mendez MD   ibuprofen (ADVIL;MOTRIN) 600 MG tablet Take 1 tablet by mouth every 6 hours as needed for Pain  Patient taking differently: Take 600 mg by mouth every 6 hours as needed for Pain Stop 7 days prior to sx . 6/14/20   Valdemar Nagy DO   FARXIGA 5 MG tablet Take 10 mg by mouth every morning  4/21/19   Historical Provider, MD   glimepiride (AMARYL) 4 MG tablet Take 4 mg by mouth daily Take 2 tablets once daily 4/27/19   Historical Provider, MD   [facility] 100 UNIT/ML injection pen Inject 62 Units into the skin daily 6/2/19   Historical Provider, MD   aspirin 81 MG chewable tablet Take 1 tablet by mouth daily Hold for 5 days after surgery, until 1/16/18  Patient taking differently: Take 81 mg by mouth daily Stop 7 days prior to sx per Dr. Leidy Goldsmith 1/10/18   Mick Mane MD   VENTOLIN  (90 BASE) MCG/ACT inhaler inhale 2 puff every 4 hours prn  Patient not taking: No sig reported 6/24/16   Historical Provider, MD     Past Medical History    has a past medical history of Asthma, Benign bladder tumor, Bilateral hearing loss, BPH (benign prostatic hyperplasia), Cancer ([facility] [address] Carcinoid tumor of lung, Cataract, GERD (gastroesophageal reflux disease), H/O arthroscopy of left knee, H/O seasonal allergies, Hearing aid worn, Hematuria, Hx of lithotripsy, Hyperlipidemia, Hypertension, IBS (irritable bowel syndrome), Kidney stone, Left ureteral stone, Neoplasm of unspecified nature of bone, soft tissue, and skin, Osteoarthritis, Snores, SOB (shortness of breath), Type II or unspecified type diabetes mellitus without mention of complication, not stated as uncontrolled, Under care of team, Under care of team, Under care of team, Ureteral stent retained, and Wears glasses. Past Surgical History   has a past surgical history that includes bladder tumor excision (2009); Lung surgery (Right, 05/23/2011); pre-malignant / benign skin lesion excision (Left, 06/09/2014); shoulder surgery (Right); shoulder surgery (Left); Cystoscopy; bronchoscopy; Knee arthroscopy; Cystoscopy (Left, 09/21/2015);  Cystoscopy (2015); Ureter stent placement (Left, 2015); Lithotripsy (2015); pr fragment kidney stone/ eswl (Left, 01/10/2018); cysto/uretero/pyeloscopy, calculus tx (Right, 2020); Colonoscopy (2015); hernia repair (Left); Tonsillectomy; eye surgery; Lithotripsy (Left, 2021); skin biopsy (2007); skin biopsy (2009); skin biopsy (02/10/2010); skin biopsy (2021); skin biopsy (N/A, 2021); and Inguinal hernia repair (Bilateral, 2022). Social History   reports that he has never smoked. He has never used smokeless tobacco.    reports current alcohol use. reports no history of drug use. Marital Status    Family History  Family Status   Relation Name Status    Mother      Father      Sister  Alive    MGM  (Not Specified)    Brother  Alive     family history includes Diabetes in his brother, maternal grandmother, mother, and sister; Heart Disease in his father and mother. Review of Systems:  CONSTITUTIONAL:   negative for fevers, chills, fatigue and malaise    EYES:   negative for double vision, blurred vision and photophobia    HEENT:   negative for tinnitus, epistaxis and sore throat     RESPIRATORY:   negative for cough, shortness of breath, wheezing     CARDIOVASCULAR:   negative for chest pain, palpitations, syncope, edema     GASTROINTESTINAL:   negative for nausea, vomiting abdominal tenderness   GENITOURINARY:   negative for incontinence     MUSCULOSKELETAL:   negative for neck or back pain     NEUROLOGICAL:   Negative for weakness and tingling  negative for headaches and dizziness     PSYCHIATRIC:   negative for anxiety       OBJECTIVE:   VITALS:  height is 6' (1.829 m) and weight is 224 lb (101.6 kg). His temporal temperature is 94.6 °F (34.8 °C) (abnormal). His blood pressure is 156/81 (abnormal) and his pulse is 91. His respiration is 20 and oxygen saturation is 97%. CONSTITUTIONAL:alert & oriented x 3, no acute distress.  Calm and pleasant. SKIN:  Warm and dry, no rashes to exposed areas of skin. HEAD:  Normocephalic, atraumatic. EYES: PERRL. EOMs intact. Wearing glasses. EARS:  Intact and equal bilaterally. Hearing loss bilaterally; wearing aides. NOSE:  Nares patent. No rhinorrhea   MOUTH/THROAT:  Mucous membranes pink and moist, teeth appear to be intact. NECK:supple, good ROM. LUNGS: Respirations even and non-labored. Clear to auscultation bilaterally, no wheezes, rales, or rhonchi. CARDIOVASCULAR: Regular rate and rhythm, no murmurs. ABDOMEN: soft, generalized tenderness near surgical sites; non-distended, bowel sounds active x 4. 3 surgical sites are closed, nondraining, pink. EXTREMITIES: No edema to bilateral lower extremities. No varicosities to bilateral lower extremities. NEUROLOGIC: CN II-XII are grossly intact. Gait is smooth. Testing:   EKG: 10/4/2022  Labs pending: drawn 10/4/2022   IMPRESSIONS:   Kidney stones, benign neoplasm of left renal mass.    PLANS:   HOLMIUM, CYSTOSCOPY, URETEROSCOPY, STENT PLACEMENT, POSSIBLE RENAL PELVIC BIOPSY, - Left    Lokesh Wilder, HAMILTON - CNP  Electronically signed 10/4/2022 at 3:07 PM

## 2022-10-04 NOTE — PROGRESS NOTES
Anesthesia Focused Assessment    Hx of anesthesia complications:  no  Family hx of anesthesia complications:  no      Prior + Covid-19 test? no      STOP-BANG Sleep Apnea Questionnaire    SNORE loudly (heard through closed doors)? Yes  TIRED, fatigued, sleepy during daytime? No  OBSERVED stopping breathing during sleep? No  High blood PRESSURE or being treated? Yes    BMI over 35? No  AGE over 48? Yes  NECK circumference over 16\"? No  GENDER (male)? Yes             Total 4  High risk 5-8  Intermediate risk 3-4  Low risk 0-2    ----------------------------------------------------------------------------------------------------------------------  SUKHI                              No  If yes, machine? DM1                                            No  DM2                   Yes    Coronary Artery Disease      No  HTN         Yes  Defib/AICD/Pacemaker               No             Renal Failure                   No  If yes, on dialysis           Active smoker? No  Drinks alcohol? No  Illicit drugs? No  Dentition?        benign      Past Medical History:   Diagnosis Date    Asthma     Dr. Kemar Pandey. 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 Jose Vital PCP    Hypertension     managed by Dr. Jose Vital PCP.  Does not have a cardiologist    IBS (irritable bowel syndrome)     Kidney stone     multiple times   Dr. Osman Benitez    Left ureteral stone     Neoplasm of unspecified nature of bone, soft tissue, and skin 05/20/2014    lesion of left side of nose    Osteoarthritis     hands    Snores     SOB (shortness of breath)     with exertion    Type II or unspecified type diabetes mellitus without mention of complication, not stated as uncontrolled     managed by Dr. Sung Zambrano Endocrinologist    Under care of team 09/23/2022    Bulmaro Huerta Ambia    Under care of team     Dr. Sung Zabmrano   endocrinology last visit spring 2022    Under care of team 09/2022    Dr. Emani Majano  surgeon [facility]  hernia repair    Ureteral stent retained     in place x 2    Wears glasses          Patient was evaluated in PAT & anesthesia guidelines were applied. NPO guidelines, medication instructions and scheduled arrival time were reviewed with patient. Anesthesia contacted:   yes    Spoke with Dr. Kaelyn Cordova regarding patient with recent hernia repair completed 9/28/2022. Inquiring if patient needs updated medical clearance due to this surgery completed prior to upcoming surgery. Medical or cardiac clearance ordered: no, medical clearance on file.      HAMILTON Tamayo CNP   10/4/22  3:11 PM

## 2022-10-05 LAB
CULTURE: NO GROWTH
SPECIMEN DESCRIPTION: NORMAL

## 2022-10-11 NOTE — DISCHARGE INSTRUCTIONS
Ureteroscopy Discharge Instructions: Take prescriptions as directed, ensuring you take entire course of antibiotic. No driving while taking narcotic pain medication. Wean off narcotics as soon as able. OK to shower after discharge. May resume regular diet. No heavy lifting >10lbs day of procedure, avoid strenuous activity, may walk. You may have a string taped to your pelvis, genitalia, or inner thigh. Please take care while showering/wiping that you do not tug on the string and dislodge your indwelling stent early. OK to remove stent by pulling the string straight out in 4 days. Please call the office if you have difficulty removing your stent  You may see blood in the urine after the procedure and entire time stent is in place. This should resolve over the next couple days. Please stay hydrated. You may experience flank pain, and/or frequency/urgency of urination while the stent is in place. Please use Flomax (Tamsulosin) to help with these symptoms. Please call attending physician or hospital  with questions. Please call or present to ED for fever >101 F, intractable nausea and vomiting, or uncontrolled pain. Follow up with Dr. Colt Meredith in 6 weeks with renal ultrasound prior to appointment. Call office to confirm appointment. Pt should pull stent in the morning of 10/18/22. There may be some pain associated with the stent removal, which is usually self limiting. We suggest using the pain medication prescribed for you and a nonsteroidal anti-inflammatory such as Ibuprofen, if you are able to take this medication, to control symptoms. Take Ibuprofen as directed for 24 hrs after stent pull. Please stay hydrated. Please call with questions. OK TO START TAKING  IBUPROFEN AND ASPIRIN TOMORROW     No alcoholic beverages, no driving or operating machinery, no making important decisions for 24 hours. You may have a normal diet but should eat lightly day of surgery.   Drink plenty of fluids.   Urinate within 8 hours after surgery, if unable to urinate call your doctor

## 2022-10-13 ENCOUNTER — TELEPHONE (OUTPATIENT)
Dept: UROLOGY | Age: 67
End: 2022-10-13

## 2022-10-13 ENCOUNTER — ANESTHESIA EVENT (OUTPATIENT)
Dept: OPERATING ROOM | Age: 67
End: 2022-10-13
Payer: MEDICARE

## 2022-10-13 RX ORDER — INSULIN LISPRO-AABC 100 [IU]/ML
7 INJECTION, SOLUTION SUBCUTANEOUS
COMMUNITY

## 2022-10-13 NOTE — PROGRESS NOTES
Spoke with patient to follow up on PAT blood sugar of 320. Went to appointment 10/12/2022 with Endocrinologist and HGB A1C was 9.4. Medication changes completed in EHR. Patient also states that Dr. Shelby Ramirez told him he can not have surgery 10/14 until they can get his sugar in better control and that Dr. Deniz Molina office was calling Dr. Hazel Bateman office. Message left for Dr. Hazel Bateman surgery scheduler of above. Dr. Hazel Bateman office calls back. State they have been conversing with Endocrinology and with Dr. Wily Del Rio and patient needs surgery. They are notifying patient of plan to proceed.

## 2022-10-13 NOTE — TELEPHONE ENCOUNTER
Per Dr. Robin Alexander, patient to be on for surgical intervention on 10/14. Patient notified. Arrival time 0600.

## 2022-10-14 ENCOUNTER — APPOINTMENT (OUTPATIENT)
Dept: GENERAL RADIOLOGY | Age: 67
End: 2022-10-14
Attending: UROLOGY
Payer: MEDICARE

## 2022-10-14 ENCOUNTER — ANESTHESIA (OUTPATIENT)
Dept: OPERATING ROOM | Age: 67
End: 2022-10-14
Payer: MEDICARE

## 2022-10-14 ENCOUNTER — HOSPITAL ENCOUNTER (OUTPATIENT)
Age: 67
Setting detail: OUTPATIENT SURGERY
Discharge: HOME | End: 2022-10-14
Attending: UROLOGY | Admitting: UROLOGY
Payer: MEDICARE

## 2022-10-14 VITALS
WEIGHT: 228 LBS | DIASTOLIC BLOOD PRESSURE: 72 MMHG | HEIGHT: 72 IN | TEMPERATURE: 97 F | SYSTOLIC BLOOD PRESSURE: 134 MMHG | HEART RATE: 72 BPM | RESPIRATION RATE: 17 BRPM | OXYGEN SATURATION: 95 % | BODY MASS INDEX: 30.88 KG/M2

## 2022-10-14 DIAGNOSIS — G89.18 POST-OP PAIN: Primary | ICD-10-CM

## 2022-10-14 LAB
GLUCOSE BLD-MCNC: 231 MG/DL (ref 75–110)
GLUCOSE BLD-MCNC: 235 MG/DL (ref 75–110)

## 2022-10-14 PROCEDURE — 6360000002 HC RX W HCPCS: Performed by: NURSE ANESTHETIST, CERTIFIED REGISTERED

## 2022-10-14 PROCEDURE — 3209999900 FLUORO FOR SURGICAL PROCEDURES

## 2022-10-14 PROCEDURE — C1769 GUIDE WIRE: HCPCS | Performed by: UROLOGY

## 2022-10-14 PROCEDURE — 3600000004 HC SURGERY LEVEL 4 BASE: Performed by: UROLOGY

## 2022-10-14 PROCEDURE — 82947 ASSAY GLUCOSE BLOOD QUANT: CPT

## 2022-10-14 PROCEDURE — 2720000010 HC SURG SUPPLY STERILE: Performed by: UROLOGY

## 2022-10-14 PROCEDURE — C2617 STENT, NON-COR, TEM W/O DEL: HCPCS | Performed by: UROLOGY

## 2022-10-14 PROCEDURE — 2580000003 HC RX 258: Performed by: ANESTHESIOLOGY

## 2022-10-14 PROCEDURE — 3600000014 HC SURGERY LEVEL 4 ADDTL 15MIN: Performed by: UROLOGY

## 2022-10-14 PROCEDURE — 7100000000 HC PACU RECOVERY - FIRST 15 MIN: Performed by: UROLOGY

## 2022-10-14 PROCEDURE — 3700000001 HC ADD 15 MINUTES (ANESTHESIA): Performed by: UROLOGY

## 2022-10-14 PROCEDURE — C1758 CATHETER, URETERAL: HCPCS | Performed by: UROLOGY

## 2022-10-14 PROCEDURE — 7100000040 HC SPAR PHASE II RECOVERY - FIRST 15 MIN: Performed by: UROLOGY

## 2022-10-14 PROCEDURE — 6360000002 HC RX W HCPCS: Performed by: PHYSICIAN ASSISTANT

## 2022-10-14 PROCEDURE — 7100000041 HC SPAR PHASE II RECOVERY - ADDTL 15 MIN: Performed by: UROLOGY

## 2022-10-14 PROCEDURE — 3700000000 HC ANESTHESIA ATTENDED CARE: Performed by: UROLOGY

## 2022-10-14 PROCEDURE — 2709999900 HC NON-CHARGEABLE SUPPLY: Performed by: UROLOGY

## 2022-10-14 PROCEDURE — 2500000003 HC RX 250 WO HCPCS: Performed by: NURSE ANESTHETIST, CERTIFIED REGISTERED

## 2022-10-14 PROCEDURE — 7100000001 HC PACU RECOVERY - ADDTL 15 MIN: Performed by: UROLOGY

## 2022-10-14 PROCEDURE — 2580000003 HC RX 258: Performed by: UROLOGY

## 2022-10-14 DEVICE — URETERAL STENT
Type: IMPLANTABLE DEVICE | Site: URETER | Status: FUNCTIONAL
Brand: POLARIS™ ULTRA

## 2022-10-14 RX ORDER — MORPHINE SULFATE 2 MG/ML
2 INJECTION, SOLUTION INTRAMUSCULAR; INTRAVENOUS EVERY 5 MIN PRN
Status: CANCELLED | OUTPATIENT
Start: 2022-10-14

## 2022-10-14 RX ORDER — FENTANYL CITRATE 50 UG/ML
25 INJECTION, SOLUTION INTRAMUSCULAR; INTRAVENOUS EVERY 5 MIN PRN
Status: CANCELLED | OUTPATIENT
Start: 2022-10-14

## 2022-10-14 RX ORDER — MAGNESIUM HYDROXIDE 1200 MG/15ML
LIQUID ORAL CONTINUOUS PRN
Status: DISCONTINUED | OUTPATIENT
Start: 2022-10-14 | End: 2022-10-14 | Stop reason: HOSPADM

## 2022-10-14 RX ORDER — FENTANYL CITRATE 50 UG/ML
INJECTION, SOLUTION INTRAMUSCULAR; INTRAVENOUS PRN
Status: DISCONTINUED | OUTPATIENT
Start: 2022-10-14 | End: 2022-10-14 | Stop reason: SDUPTHER

## 2022-10-14 RX ORDER — SODIUM CHLORIDE, SODIUM LACTATE, POTASSIUM CHLORIDE, CALCIUM CHLORIDE 600; 310; 30; 20 MG/100ML; MG/100ML; MG/100ML; MG/100ML
1000 INJECTION, SOLUTION INTRAVENOUS CONTINUOUS
Status: DISCONTINUED | OUTPATIENT
Start: 2022-10-14 | End: 2022-10-14 | Stop reason: HOSPADM

## 2022-10-14 RX ORDER — PROPOFOL 10 MG/ML
INJECTION, EMULSION INTRAVENOUS PRN
Status: DISCONTINUED | OUTPATIENT
Start: 2022-10-14 | End: 2022-10-14 | Stop reason: SDUPTHER

## 2022-10-14 RX ORDER — ONDANSETRON 2 MG/ML
4 INJECTION INTRAMUSCULAR; INTRAVENOUS
Status: CANCELLED | OUTPATIENT
Start: 2022-10-14 | End: 2022-10-15

## 2022-10-14 RX ORDER — MAGNESIUM HYDROXIDE 1200 MG/15ML
LIQUID ORAL PRN
Status: DISCONTINUED | OUTPATIENT
Start: 2022-10-14 | End: 2022-10-14 | Stop reason: HOSPADM

## 2022-10-14 RX ORDER — HYDROCODONE BITARTRATE AND ACETAMINOPHEN 5; 325 MG/1; MG/1
1 TABLET ORAL EVERY 4 HOURS PRN
Qty: 18 TABLET | Refills: 0 | Status: SHIPPED | OUTPATIENT
Start: 2022-10-14 | End: 2022-10-17

## 2022-10-14 RX ORDER — SODIUM CHLORIDE 0.9 % (FLUSH) 0.9 %
5-40 SYRINGE (ML) INJECTION PRN
Status: CANCELLED | OUTPATIENT
Start: 2022-10-14

## 2022-10-14 RX ORDER — MIDAZOLAM HYDROCHLORIDE 1 MG/ML
INJECTION INTRAMUSCULAR; INTRAVENOUS PRN
Status: DISCONTINUED | OUTPATIENT
Start: 2022-10-14 | End: 2022-10-14 | Stop reason: SDUPTHER

## 2022-10-14 RX ORDER — DEXAMETHASONE SODIUM PHOSPHATE 10 MG/ML
INJECTION, SOLUTION INTRAMUSCULAR; INTRAVENOUS PRN
Status: DISCONTINUED | OUTPATIENT
Start: 2022-10-14 | End: 2022-10-14 | Stop reason: SDUPTHER

## 2022-10-14 RX ORDER — ONDANSETRON 2 MG/ML
INJECTION INTRAMUSCULAR; INTRAVENOUS PRN
Status: DISCONTINUED | OUTPATIENT
Start: 2022-10-14 | End: 2022-10-14 | Stop reason: SDUPTHER

## 2022-10-14 RX ORDER — SODIUM CHLORIDE 9 MG/ML
INJECTION, SOLUTION INTRAVENOUS PRN
Status: CANCELLED | OUTPATIENT
Start: 2022-10-14

## 2022-10-14 RX ORDER — LIDOCAINE HYDROCHLORIDE 10 MG/ML
INJECTION, SOLUTION EPIDURAL; INFILTRATION; INTRACAUDAL; PERINEURAL PRN
Status: DISCONTINUED | OUTPATIENT
Start: 2022-10-14 | End: 2022-10-14 | Stop reason: SDUPTHER

## 2022-10-14 RX ORDER — DIPHENHYDRAMINE HYDROCHLORIDE 50 MG/ML
12.5 INJECTION INTRAMUSCULAR; INTRAVENOUS
Status: CANCELLED | OUTPATIENT
Start: 2022-10-14 | End: 2022-10-15

## 2022-10-14 RX ORDER — TAMSULOSIN HYDROCHLORIDE 0.4 MG/1
0.4 CAPSULE ORAL DAILY
Qty: 30 CAPSULE | Refills: 0 | Status: SHIPPED | OUTPATIENT
Start: 2022-10-14

## 2022-10-14 RX ORDER — SODIUM CHLORIDE 0.9 % (FLUSH) 0.9 %
5-40 SYRINGE (ML) INJECTION EVERY 12 HOURS SCHEDULED
Status: CANCELLED | OUTPATIENT
Start: 2022-10-14

## 2022-10-14 RX ORDER — CEFADROXIL 1000 MG/1
1 TABLET ORAL 2 TIMES DAILY
Qty: 6 TABLET | Refills: 0 | Status: SHIPPED | OUTPATIENT
Start: 2022-10-14 | End: 2022-10-17

## 2022-10-14 RX ADMIN — FENTANYL CITRATE 50 MCG: 50 INJECTION, SOLUTION INTRAMUSCULAR; INTRAVENOUS at 07:28

## 2022-10-14 RX ADMIN — Medication 2000 MG: at 07:40

## 2022-10-14 RX ADMIN — PROPOFOL 150 MG: 10 INJECTION, EMULSION INTRAVENOUS at 07:31

## 2022-10-14 RX ADMIN — ONDANSETRON 4 MG: 2 INJECTION INTRAMUSCULAR; INTRAVENOUS at 07:37

## 2022-10-14 RX ADMIN — FENTANYL CITRATE 50 MCG: 50 INJECTION, SOLUTION INTRAMUSCULAR; INTRAVENOUS at 07:37

## 2022-10-14 RX ADMIN — LIDOCAINE HYDROCHLORIDE 50 MG: 10 INJECTION, SOLUTION EPIDURAL; INFILTRATION; INTRACAUDAL; PERINEURAL at 07:31

## 2022-10-14 RX ADMIN — FENTANYL CITRATE 50 MCG: 50 INJECTION, SOLUTION INTRAMUSCULAR; INTRAVENOUS at 07:47

## 2022-10-14 RX ADMIN — MIDAZOLAM 2 MG: 1 INJECTION INTRAMUSCULAR; INTRAVENOUS at 07:28

## 2022-10-14 RX ADMIN — FENTANYL CITRATE 50 MCG: 50 INJECTION, SOLUTION INTRAMUSCULAR; INTRAVENOUS at 07:52

## 2022-10-14 RX ADMIN — DEXAMETHASONE SODIUM PHOSPHATE 10 MG: 10 INJECTION INTRAMUSCULAR; INTRAVENOUS at 07:37

## 2022-10-14 RX ADMIN — SODIUM CHLORIDE, POTASSIUM CHLORIDE, SODIUM LACTATE AND CALCIUM CHLORIDE 1000 ML: 600; 310; 30; 20 INJECTION, SOLUTION INTRAVENOUS at 06:29

## 2022-10-14 ASSESSMENT — ENCOUNTER SYMPTOMS
SHORTNESS OF BREATH: 0
STRIDOR: 0

## 2022-10-14 ASSESSMENT — PAIN - FUNCTIONAL ASSESSMENT: PAIN_FUNCTIONAL_ASSESSMENT: NONE - DENIES PAIN

## 2022-10-14 ASSESSMENT — LIFESTYLE VARIABLES: SMOKING_STATUS: 0

## 2022-10-14 NOTE — OP NOTE
Operative Note      Patient: Eleuterio Whitley  YOB: 1955  MRN: [id number]    Date of Procedure: 10/14/2022    Pre-Op Diagnosis: Thickened renal pelvis    Post-Op Diagnosis: left renal stone       Procedure: Cystoscopy with left ureteroscopy holmium laser lithotripsy and stent placement (6 English, 26 cm double-J stent with string)    Surgeon(s):  Shanti Mc MD    Assistant:   * No surgical staff found *    Anesthesia: General    Estimated Blood Loss (mL): Minimal    Complications: None    Specimens:   * No specimens in log *    Implants:  Implant Name Type Inv. Item Serial No.  Lot No. LRB No. Used Action   STENT URET 6FR L26CM PERCFLX HYDR+ DBL PGTL THRD 2 - [id number]  STENT URET 6FR L26CM PERCFLX HYDR+ DBL PGTL THRD 2  [employer] UROLOGY- [id number] Left 1 Implanted         Drains: * No LDAs found *    Findings: left renal stone approximately 6 mm in size successfully fragmented     Detailed Description of Procedure: The patient was brought to the operating room. He was properly identified. He was administered a general anesthetic and placed in modified dorsolithotomy position. He was prepped and draped in sterile fashion. A proper timeout was performed. A cystoscope was then introduced into the bladder. A wire was passed into the left renal pelvis and then a dual-lumen catheter was used to pass a second wire. I then advanced a flexible ureteroscope up the working wire into the left renal pelvis. After removing the wire I did survey the entire kidney. There was no abnormality noted in the left renal pelvis. There was a 6 mm stone noted in upper pole calyx. I did use a 200 µm holmium laser fiber and I fragmented the stone into submillimeter particles. No other abnormalities were seen in the kidney or ureter. At this point I backloaded the cystoscope over the safety wire and placed a 6 English, 26 cm double-J stent.   I did leave the string on and secured this to his penis with tincture of benzoin and Steri-Strips. The procedure was terminated. The patient tolerated the procedure well. The plan for the patient is to be discharged home. He will pull his stent out in 4 days and follow-up with me in 6 weeks with a renal ultrasound.     Electronically signed by Janet Chung MD on 10/14/2022 at 8:08 AM

## 2022-10-14 NOTE — ANESTHESIA PRE PROCEDURE
Department of Anesthesiology  Preprocedure Note       Name:  Cordell Carolina   Age:  79 y.o.  :  1955                                          MRN:  [id number]         Date:  10/14/2022      Surgeon: Verna Mcmillan):  Sneha Bee MD     Procedure:ESWL EXTRACORPOREAL SHOCK WAVE LITHOTRIPSY (NEX-MED CONF# [id number] - DAISY) (Left )        Department of Anesthesiology  Pre-Anesthesia Evaluation/Consultation         Name:  Cordell Carolina                                         Age:  79 y.o.   MRN:  [id number]             Medications  Current Facility-Administered Medications   Medication Dose Route Frequency Provider Last Rate Last Admin    ceFAZolin (ANCEF) 2000 mg in sterile water 20 mL IV syringe  2,000 mg IntraVENous On Call to [address] [facility],[address], [place]-        lactated ringers infusion 1,000 mL  1,000 mL IntraVENous Continuous Dandre Roth MD 50 mL/hr at 10/14/22 0629 1,000 mL at 10/14/22 5356       Allergies   Allergen Reactions    Seasonal      Patient Active Problem List   Diagnosis    Neoplasm of unspecified nature of bone, soft tissue, and skin    Hearing difficulty    Renal calculus, left    Disorder of intervertebral disc of lumbar spine    Acquired cystic kidney disease    Asthma    Chronic low back pain    Chronic nonalcoholic liver disease    Essential hypertension    Gastroesophageal reflux disease    Hearing loss    History of adenomatous polyp of colon    Neoplasm of bladder    Neoplasm of uncertain behavior of lung    Type 2 diabetes mellitus without complication (HCC)    Non-toxic goiter    Recurrent inguinal hernia of left side without obstruction or gangrene    Right inguinal hernia     Past Medical History:   Diagnosis Date    Asthma     Dr. Jennifer Valverde. 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 Cee Hale PCP    Hypertension     managed by Dr. Cee Hale PCP. Does not have a cardiologist    IBS (irritable bowel syndrome)     Kidney stone     multiple times   Dr. Hansa Plata Left ureteral stone     Neoplasm of unspecified nature of bone, soft tissue, and skin 05/20/2014    lesion of left side of nose    Osteoarthritis     hands    Snores     SOB (shortness of breath)     with exertion    Type II or unspecified type diabetes mellitus without mention of complication, not stated as uncontrolled     managed by Dr. Néstor Aragon Under care of team 09/23/2022    Dr. Alejandro Goldman [place]    Under care of team     Dr. Vikash Munoz   endocrinology last visit spring 2022    Under care of team 09/2022    Dr. Adrienne Willis  surgeon Sai Mouse  hernia repair    Ureteral stent retained     in place x 2    Wears glasses      Past Surgical History:   Procedure Laterality Date    BLADDER TUMOR EXCISION  2009    benign    BRONCHOSCOPY      COLONOSCOPY  09/08/2015    2 times with polypectomy- Dr. Maggy Prajapati Right 06/17/2020    HOLMIUM LASER, CYSTOSCOPY, URETEROSCOPY, STENT PLACEMENT performed by Kaia Howard MD at [address] Left 09/21/2015    2 stents in Lt.     CYSTOSCOPY  09/30/2015    EYE SURGERY      cataract surgery both eyes    HERNIA REPAIR Left     inguinal    INGUINAL HERNIA REPAIR Bilateral 9/28/2022    HERNIA INGUINAL REPAIR LAPAROSCOPIC BILATERAL ROBOTIC XI performed by Shani Anderson DO at [facility] ARTHROSCOPY      LITHOTRIPSY  09/30/2015    laser    LITHOTRIPSY Left 03/05/2021    ESWL EXTRACORPOREAL SHOCK WAVE LITHOTRIPSY  (NEX-MED CONF# [id number] - DAISY) performed by Nicolás Dover MD at [address] Right 2011    right lower lobe \"lung carcinoid\" removal. Dr. Angeles James Cardiothoracic Surgeon   Yudelka Smith ESWL Left 01/10/2018    ESWL EXTRACORPEAL SHOCK WAVE LITHOTRIPSY, POSSIBLE  STENT PLACEMENT ([email or web address] MED CONF# [id number]) performed by Nicolás Dover MD at [address] PRE-MALIGNANT / [address] Left 2014    Excision lesion of nose. Dr. Jt Mesa.     SHOULDER SURGERY Right     closed manipulation    SHOULDER SURGERY Left     open manipulation    SKIN BIOPSY  2007    meir acilla and neck--squamous papillomas--lft buttock--subcutaneous abscess, back--compound nevus, lft neck--lentigo simplex, lft chest --junctional nevus, rt forearm--blue nevus    SKIN BIOPSY  2009    lft thigh--follicular cyst    SKIN BIOPSY  02/10/2010    rt thigh--ruptured epidermal inclusion cyst    SKIN BIOPSY  2021     BACK LESION BIOPSY EXCISION (N/A Back    SKIN BIOPSY N/A 2021    BACK LESION BIOPSY EXCISION performed by Arminda Bradford MD at [address]      as a child    URETER STENT PLACEMENT Left 2015    tiems 2 stents     Social History     Tobacco Use    Smoking status: Never    Smokeless tobacco: Never   Vaping Use    Vaping Use: Never used   Substance Use Topics    Alcohol use: Yes     Comment: once a week 1 beer    Drug use: No         Vital Signs (Current)   Vitals:    10/14/22 0611   BP: (!) 153/79   Pulse: 79   Resp: 18   Temp: 97.3 °F (36.3 °C)   SpO2: 96%     Vital Signs Statistics (for past 48 hrs)     Temp  Av.3 °F (36.3 °C)  Min: 97.3 °F (36.3 °C)   Min taken time: 10/14/22 0611  Max: 97.3 °F (36.3 °C)   Max taken time: 10/14/22 0611  Pulse  Av  Min: 78   Min taken time: 10/14/22 0611  Max: 78   Max taken time: 10/14/22 0611  Resp  Av  Min: 18   Min taken time: 10/14/22 0611  Max: 18   Max taken time: 10/14/22 0611  BP  Min: 153/79 Min taken time: 10/14/22 0611  Max: 153/79   Max taken time: 10/14/22 0611  SpO2  Av %  Min: 96 %   Min taken time: 10/14/22 0611  Max: 96 %   Max taken time: 10/14/22 0611  BP Readings from Last 3 Encounters:   10/14/22 (!) 153/79   10/04/22 (!) 156/81   22 111/60       BMI  Body mass index is 30.92 kg/m².     CBC   Lab Results   Component Value Date/Time    WBC 7.5 2022 09:24 AM    RBC 5.24 2022 09:24 AM    RBC 5.33 2012 06:20 PM    HGB 16.0 2022 09:24 AM    HCT 46.1 2022 09:24 AM    MCV 88.0 2022 09:24 AM    RDW 12.2 2022 09:24 AM     2022 09:24 AM     2012 06:20 PM       CMP    Lab Results   Component Value Date/Time     10/04/2022 01:02 PM    K 4.4 10/04/2022 01:02 PM     10/04/2022 01:02 PM    CO2 21 10/04/2022 01:02 PM    BUN 18 10/04/2022 01:02 PM    CREATININE 0.99 10/04/2022 01:02 PM    GFRAA >60 2022 09:24 AM    LABGLOM >60 10/04/2022 01:02 PM    GLUCOSE 320 10/04/2022 01:02 PM    PROT 8.0 2022 08:16 AM    CALCIUM 9.4 2022 09:24 AM    BILITOT 0.63 2022 08:16 AM    ALKPHOS 46 2022 08:16 AM    AST 22 2022 08:16 AM    ALT 31 2022 08:16 AM       BMP    Lab Results   Component Value Date/Time     10/04/2022 01:02 PM    K 4.4 10/04/2022 01:02 PM     10/04/2022 01:02 PM    CO2 21 10/04/2022 01:02 PM    BUN 18 10/04/2022 01:02 PM    CREATININE 0.99 10/04/2022 01:02 PM    CALCIUM 9.4 2022 09:24 AM    GFRAA >60 2022 09:24 AM    LABGLOM >60 10/04/2022 01:02 PM    GLUCOSE 320 10/04/2022 01:02 PM       POC Testing  Recent Labs     10/14/22  0607   POCGLU 231*       Coags  No results found for: PROTIME, INR, APTT    HCG (If Applicable) No results found for: PREGTESTUR, PREGSERUM, HCG, HCGQUANT     ABGs No results found for: PHART, PO2ART, GHI8CMG, SVL4PRM, BEART, B7BOZKXW     Type & Screen (If Applicable)  No results found for: Birgit Tone [address]    Radiology (If Applicable)    Cardiac Testing (If Applicable)     EKG (If Applicable) nl             Medications prior to admission:   Prior to Admission medications    Medication Sig Start Date End Date Taking? Authorizing Provider   Insulin Lispro-aabc, 1 U Dial, (LYUMJEV KWIKPEN) 100 UNIT/ML SOPN Inject 7 Units into the skin 3 times daily (before meals)    Historical Provider, MD   docusate sodium (COLACE) 100 MG capsule Take 1 capsule by mouth 2 times daily 9/28/22   Salome Bailey DO   vitamin B-12 (CYANOCOBALAMIN) 100 MCG tablet Take 50 mcg by mouth daily    Historical Provider, MD   fenofibrate (TRICOR) 145 MG tablet TAKE 1 TABLET DAILY  Patient taking differently: daily 9/2/22   Rosana Lancaster MD   B-D UF III MINI PEN NEEDLES 31G X 5 MM MISC INJECT 1 PEN NEEDLE INTO   THE SKIN DAILY 5/27/22   Rosana Lancaster MD   losartan (COZAAR) 50 MG tablet TAKE 1 TABLET DAILY 12/17/21   Rosana Lancaster MD   lovastatin (MEVACOR) 20 MG tablet TAKE 1 TABLET NIGHTLY  Patient taking differently: daily TAKE 1 TABLET NIGHTLY 12/2/21   Rosana Lancaster MD   ibuprofen (ADVIL;MOTRIN) 600 MG tablet Take 1 tablet by mouth every 6 hours as needed for Pain  Patient taking differently: Take 600 mg by mouth every 6 hours as needed for Pain Stop 7 days prior to sx . 6/14/20   Marko Peña DO   FARXIGA 5 MG tablet Take 10 mg by mouth every morning  4/21/19   Historical Provider, MD   glimepiride (AMARYL) 4 MG tablet Take 4 mg by mouth daily Take 2 tablets once daily 4/27/19   Historical Provider, MD Zohaib MENDIOLA 100 UNIT/ML injection pen Inject 70 Units into the skin daily 6/2/19   Historical Provider, MD   aspirin 81 MG chewable tablet Take 1 tablet by mouth daily Hold for 5 days after surgery, until 1/16/18  Patient taking differently: Take 81 mg by mouth daily Stop 7 days prior to sx per Dr. Jorge John 1/10/18   Janell Steve MD       Current medications:    No current facility-administered medications for this visit.      No current outpatient medications on file. Facility-Administered Medications Ordered in Other Visits   Medication Dose Route Frequency Provider Last Rate Last Admin    ceFAZolin (ANCEF) 2000 mg in sterile water 20 mL IV syringe  2,000 mg IntraVENous On Call to [address] [facility],[address], [place]        lactated ringers infusion 1,000 mL  1,000 mL IntraVENous Continuous Gabi Soto MD 50 mL/hr at 10/14/22 0629 1,000 mL at 10/14/22 9915       Allergies: Allergies   Allergen Reactions    Seasonal        Problem List:    Patient Active Problem List   Diagnosis Code    Neoplasm of unspecified nature of bone, soft tissue, 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    Hearing difficulty H91.90    Renal calculus, left N20.0    Disorder of intervertebral disc of lumbar spine M51.9    Acquired cystic kidney disease N28.1    Asthma J45.909    Chronic low back pain M54.50, G89.29    Chronic nonalcoholic liver disease U49.5    Essential hypertension I10    Gastroesophageal reflux disease K21.9    Hearing loss H91.90    History of adenomatous polyp of colon Z86.010    Neoplasm of bladder D49.4    Neoplasm of uncertain behavior of lung D38.1    Type 2 diabetes mellitus without complication (HCC) Z31.9    Non-toxic goiter E04.9    Recurrent inguinal hernia of left side without obstruction or gangrene K40.91    Right inguinal hernia K40.90       Past Medical History:        Diagnosis Date    Asthma     Dr. Adama Reynoso. 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 Jaren Peres PCP    Hypertension     managed by Dr. Jaren Peres PCP. Does not have a cardiologist    IBS (irritable bowel syndrome)     Kidney stone     multiple times   Dr. Hansa Plata Left ureteral stone     Neoplasm of unspecified nature of bone, soft tissue, and skin 05/20/2014    lesion of left side of nose    Osteoarthritis     hands    Snores     SOB (shortness of breath)     with exertion    Type II or unspecified type diabetes mellitus without mention of complication, not stated as uncontrolled     managed by Dr. Néstor Aragon Under care of team 09/23/2022    Dr. Alejandro Goldman, [place]    Under care of team     Dr. Vikash Munoz   endocrinology last visit spring 2022    Under care of team 09/2022    Dr. Adrienne Willis  surgeon Sai Mouse  hernia repair    Ureteral stent retained     in place x 2    Wears glasses        Past Surgical History:        Procedure Laterality Date    BLADDER TUMOR EXCISION  2009    benign    BRONCHOSCOPY      COLONOSCOPY  09/08/2015    2 times with polypectomy- Dr. Maggy Prajapati Right 06/17/2020    HOLMIUM LASER, CYSTOSCOPY, URETEROSCOPY, STENT PLACEMENT performed by Kaia Howard MD at [address] Left 09/21/2015    2 stents in Lt.     CYSTOSCOPY  09/30/2015    EYE SURGERY      cataract surgery both eyes    HERNIA REPAIR Left     inguinal    INGUINAL HERNIA REPAIR Bilateral 9/28/2022    HERNIA INGUINAL REPAIR LAPAROSCOPIC BILATERAL ROBOTIC XI performed by Shani Anderson DO at [facility] ARTHROSCOPY      LITHOTRIPSY  09/30/2015    laser    LITHOTRIPSY Left 03/05/2021    ESWL EXTRACORPOREAL SHOCK WAVE LITHOTRIPSY  (NEX-MED CONF# [id number] - DAISY) performed by Kaia Howard MD at [address] Right 05/23/2011    right lower lobe \"lung carcinoid\" removal. Dr. Paddy Enrique ESWL Left 01/10/2018    ESWL [address] LITHOTRIPSY, POSSIBLE  STENT PLACEMENT (NEX MED CONF# [id number]) performed by Christine Tello MD at [address] PRE-MALIGNANT / BENIGN SKIN LESION EXCISION Left 06/09/2014    Excision lesion of nose. Dr. Kaleigh Davis.  SHOULDER SURGERY Right     closed manipulation    SHOULDER SURGERY Left     open manipulation    SKIN BIOPSY  11/12/2007    meir acilla and neck--squamous papillomas--lft buttock--subcutaneous abscess, back--compound nevus, lft neck--lentigo simplex, lft chest --junctional nevus, rt forearm--blue nevus    SKIN BIOPSY  12/09/2009    lft thigh--follicular cyst    SKIN BIOPSY  02/10/2010    rt thigh--ruptured epidermal inclusion cyst    SKIN BIOPSY  09/29/2021     BACK LESION BIOPSY EXCISION (N/A Back    SKIN BIOPSY N/A 09/29/2021    BACK LESION BIOPSY EXCISION performed by Saima Laguna MD at [address] [place] [address] Place      as a child    URETER STENT PLACEMENT Left 09/30/2015    tiems 2 stents       Social History:    Social History     Tobacco Use    Smoking status: Never    Smokeless tobacco: Never   Substance Use Topics    Alcohol use: Yes     Comment: once a week 1 beer                                Counseling given: Not Answered      Vital Signs (Current): There were no vitals filed for this visit.                                            BP Readings from Last 3 Encounters:   10/14/22 (!) 153/79   10/04/22 (!) 156/81   09/28/22 111/60       NPO Status:  MN                                                                               BMI:   Wt Readings from Last 3 Encounters:   10/14/22 228 lb (103.4 kg)   10/04/22 224 lb (101.6 kg)   09/28/22 229 lb 6.4 oz (104.1 kg)     There is no height or weight on file to calculate BMI.    CBC:   Lab Results   Component Value Date/Time    WBC 7.5 09/13/2022 09:24 AM    RBC 5.24 09/13/2022 09:24 AM    RBC 5.33 06/07/2012 06:20 PM    HGB 16.0 09/13/2022 09:24 AM    HCT 46.1 09/13/2022 09:24 AM    MCV 88.0 09/13/2022 09:24 AM    RDW 12.2 09/13/2022 09:24 AM     09/13/2022 09:24 AM     06/07/2012 06:20 PM       CMP:   Lab Results   Component Value Date/Time     10/04/2022 01:02 PM    K 4.4 10/04/2022 01:02 PM     10/04/2022 01:02 PM    CO2 21 10/04/2022 01:02 PM    BUN 18 10/04/2022 01:02 PM    CREATININE 0.99 10/04/2022 01:02 PM    GFRAA >60 09/13/2022 09:24 AM    LABGLOM >60 10/04/2022 01:02 PM    GLUCOSE 320 10/04/2022 01:02 PM    PROT 8.0 04/04/2022 08:16 AM    CALCIUM 9.4 09/13/2022 09:24 AM    BILITOT 0.63 04/04/2022 08:16 AM    ALKPHOS 46 04/04/2022 08:16 AM    AST 22 04/04/2022 08:16 AM    ALT 31 04/04/2022 08:16 AM       POC Tests:   Recent Labs     10/14/22  0607   POCGLU 231*       Coags: No results found for: PROTIME, INR, APTT    HCG (If Applicable): No results found for: PREGTESTUR, PREGSERUM, HCG, HCGQUANT     ABGs: No results found for: PHART, PO2ART, JJR4MXH, ORW6FNH, BEART, E5DWSLDE     Type & Screen (If Applicable):  No results found for: LABABO, [address]    Anesthesia Evaluation  Patient summary reviewed and Nursing notes reviewed no history of anesthetic complications:   Airway: Mallampati: II     Neck ROM: full  Mouth opening: > = 3 FB   Dental:          Pulmonary:   (+) asthma:     (-) shortness of breath, sleep apnea, stridor and not a current smoker                          ROS comment: Carcinoid tumor   Cardiovascular:  Exercise tolerance: good (>4 METS),   (+) hypertension:,     (-) pacemaker, past MI, CAD, CABG/stent, dysrhythmias,  angina and  CHF        Rate: normal                    Neuro/Psych:   (+) neuromuscular disease:,    (-) seizures, TIA and CVA           GI/Hepatic/Renal:   (+) renal disease: kidney stones,      (-) GERD and liver disease      ROS comment: Bladder CA. Endo/Other:    (+) DiabetesType II DM, poorly controlled, using insulin, .    (-) hypothyroidism, hyperthyroidism, blood dyscrasia               Abdominal:       Abdomen: soft. Vascular:           Other Findings:      Narrative     Normal sinus rhythm  Normal ECG  When compared with ECG of 25-FEB-2000 09:24,  No significant change was found   Scans on Order [id number]     Scan on 12/29/2017 11:25 AM by SCANNING, [facility]            Anesthesia Plan      general     ASA 4       Induction: intravenous. MIPS: Postoperative opioids intended and Prophylactic antiemetics administered. Anesthetic plan and risks discussed with patient. Plan discussed with CRNA.                     Estela Lockhart MD   10/14/2022

## 2022-10-14 NOTE — ANESTHESIA POSTPROCEDURE EVALUATION
Department of Anesthesiology  Postprocedure Note    Patient: Evelia Royal  MRN: [id number]  YOB: 1955  Date of evaluation: 10/14/2022      Procedure Summary     Date: 10/14/22 Room / Location: [address]    Anesthesia Start: 0142 Anesthesia Stop: 1455    Procedure: HOLMIUM, CYSTOSCOPY, URETEROSCOPY, STENT PLACEMENT (Left) Diagnosis:       Kidney stones      (BENIGN NEOPLASM LEFT RENAL PELVIS, KIDNEY STONE)    Surgeons: Radha Craig MD Responsible Provider: Concepcion Dyson MD    Anesthesia Type: general ASA Status: 4          Anesthesia Type: No value filed.     Jaguar Phase I: Jaguar Score: 10    Jaguar Phase II: Jaguar Score: 10      Anesthesia Post Evaluation    Patient location during evaluation: bedside  Patient participation: complete - patient participated  Level of consciousness: awake and alert  Pain score: 0  Nausea & Vomiting: no nausea  Cardiovascular status: hemodynamically stable  Respiratory status: room air done

## 2022-10-14 NOTE — INTERVAL H&P NOTE
Update History & Physical    The patient's History and Physical of October 4, 2022 was reviewed with the patient and I examined the patient. There was no change. The surgical site was confirmed by the patient and me. Plan: The risks, benefits, expected outcome, and alternative to the recommended procedure have been discussed with the patient. Patient understands and wants to proceed with the procedure.      Electronically signed by Socrates Zamorano PA-C on 10/14/2022 at 7:01 AM

## 2022-10-17 DIAGNOSIS — N20.0 KIDNEY STONES: Primary | ICD-10-CM

## 2022-10-19 ENCOUNTER — HOSPITAL ENCOUNTER (OUTPATIENT)
Dept: ULTRASOUND IMAGING | Age: 67
Discharge: HOME | End: 2022-10-21
Payer: MEDICARE

## 2022-10-19 DIAGNOSIS — N20.0 KIDNEY STONES: ICD-10-CM

## 2022-10-19 PROCEDURE — 76770 US EXAM ABDO BACK WALL COMP: CPT

## 2022-10-24 ENCOUNTER — OFFICE VISIT (OUTPATIENT)
Dept: UROLOGY | Age: 67
End: 2022-10-24
Payer: MEDICARE

## 2022-10-24 VITALS
BODY MASS INDEX: 30.61 KG/M2 | OXYGEN SATURATION: 95 % | TEMPERATURE: 97.5 F | HEIGHT: 72 IN | DIASTOLIC BLOOD PRESSURE: 80 MMHG | SYSTOLIC BLOOD PRESSURE: 140 MMHG | HEART RATE: 72 BPM | WEIGHT: 226 LBS

## 2022-10-24 DIAGNOSIS — N40.1 BPH WITH OBSTRUCTION/LOWER URINARY TRACT SYMPTOMS: ICD-10-CM

## 2022-10-24 DIAGNOSIS — N13.8 BPH WITH OBSTRUCTION/LOWER URINARY TRACT SYMPTOMS: ICD-10-CM

## 2022-10-24 DIAGNOSIS — N20.0 KIDNEY STONES: Primary | ICD-10-CM

## 2022-10-24 PROCEDURE — 1123F ACP DISCUSS/DSCN MKR DOCD: CPT | Performed by: UROLOGY

## 2022-10-24 PROCEDURE — G8484 FLU IMMUNIZE NO ADMIN: HCPCS | Performed by: UROLOGY

## 2022-10-24 PROCEDURE — G8417 CALC BMI ABV UP PARAM F/U: HCPCS | Performed by: UROLOGY

## 2022-10-24 PROCEDURE — 99213 OFFICE O/P EST LOW 20 MIN: CPT | Performed by: UROLOGY

## 2022-10-24 PROCEDURE — G8427 DOCREV CUR MEDS BY ELIG CLIN: HCPCS | Performed by: UROLOGY

## 2022-10-24 PROCEDURE — 1036F TOBACCO NON-USER: CPT | Performed by: UROLOGY

## 2022-10-24 PROCEDURE — 3017F COLORECTAL CA SCREEN DOC REV: CPT | Performed by: UROLOGY

## 2022-10-24 ASSESSMENT — ENCOUNTER SYMPTOMS
COUGH: 0
SHORTNESS OF BREATH: 0
WHEEZING: 0

## 2022-10-24 NOTE — PROGRESS NOTES
[address] [address]  Dept:  [facility] Urology Office Note - Established    Patient:  Telly Rhodes  YOB: 1955  Date: 10/24/2022    The patient is a 79 y.o. male who presents todayfor evaluation of the following problems:   Chief Complaint   Patient presents with    Post-Op Check     Cystoscopy        HPI  This is a very pleasant 26-year-old gentleman who recently underwent ureteroscopy. There was concern of a renal pelvic abnormality but endoscopically we did not see anything concerning. Have some stones. His renal ultrasound shows no hydronephrosis. He does have residual stone fragments but nothing obstructive. He feels great. He continues to void well as long as he takes Flomax. Summary of old records: N/A    Additional History: N/A    Procedures Today: N/A    Urinalysis today:  No results found for this visit on 10/24/22. Last several PSA's:  Lab Results   Component Value Date    PSA 0.18 04/05/2018    PSA 0.17 12/28/2017    PSA 0.20 08/07/2015     Last total testosterone:  No results found for: TESTOSTERONE    AUA Symptom Score (10/24/2022):                                Last BUN and creatinine:  Lab Results   Component Value Date    BUN 18 10/04/2022     Lab Results   Component Value Date    CREATININE 0.99 10/04/2022       Additional Lab/Culture results: none    Imaging Reviewed during this Office Visit: none  (results were independently reviewed by physician and radiology report verified)    PAST MEDICAL, FAMILY AND SOCIAL HISTORY UPDATE:  Past Medical History:   Diagnosis Date    Asthma     Dr. Adán Braxton. last seen 4-5 years ago    Benign bladder tumor     Bilateral hearing loss     uses aids  bilat    BPH (benign prostatic hyperplasia)     Cancer (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 Niraj Pink PCP    Hypertension     managed by Dr. Niraj Pink PCP. Does not have a cardiologist    IBS (irritable bowel syndrome)     Kidney stone     multiple times   Dr. 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 Alex Szymanski Endocrinologist    Under care of team 09/23/2022    Dr. Rafia Lua, [place]    Under care of team     Dr. Alex Szymanski   endocrinology last visit spring 2022    Under care of team 09/2022    Dr. Nolan File  surgeon [facility]  hernia repair    Ureteral stent retained     in place x 2    Wears glasses      Past Surgical History:   Procedure Laterality Date    BLADDER TUMOR EXCISION  2009    benign    BRONCHOSCOPY      COLONOSCOPY  09/08/2015    2 times with polypectomy- Dr. Aamir Gee, [place] Right 06/17/2020    HOLMIUM LASER, CYSTOSCOPY, URETEROSCOPY, STENT PLACEMENT performed by Gabby Gayle MD at [address] Left 09/21/2015    2 stents in [address]     CYSTOSCOPY  09/30/2015    EYE SURGERY      cataract surgery both eyes    HERNIA REPAIR Left     inguinal    INGUINAL HERNIA REPAIR Bilateral 09/28/2022    HERNIA INGUINAL REPAIR LAPAROSCOPIC BILATERAL ROBOTIC XI performed by Arie Antonio DO at [facility] ARTHROSCOPY      LITHOTRIPSY  09/30/2015    laser    LITHOTRIPSY Left 03/05/2021    ESWL EXTRACORPOREAL SHOCK WAVE LITHOTRIPSY  ([facility] CONF# [id number] - DAISY) performed by Gabby Gayle MD at [address] [place] [address] Right 05/23/2011    right lower lobe \"lung carcinoid\" removal. Dr. Daniel Stewart ESWL Left 01/10/2018    ESWL EXTRACORPEAL SHOCK WAVE LITHOTRIPSY, POSSIBLE  STENT PLACEMENT ([employer] CONF# [id number]) performed by Benito Arango MD at [address] [place] [address] / [address] [facility] Left 06/09/2014    Excision lesion of nose. Dr. Rafat Gamez.     SHOULDER SURGERY Right     closed manipulation    SHOULDER SURGERY Left     open manipulation    SKIN BIOPSY  11/12/2007    meir acilla and neck--squamous papillomas--lft buttock--subcutaneous abscess, back--compound nevus, lft neck--lentigo simplex, lft chest --junctional nevus, rt forearm--blue nevus    SKIN BIOPSY  12/09/2009    lft thigh--follicular cyst    SKIN BIOPSY  02/10/2010    rt thigh--ruptured epidermal inclusion cyst    SKIN BIOPSY  09/29/2021     BACK LESION BIOPSY EXCISION (N/A Back    SKIN BIOPSY N/A 09/29/2021    BACK LESION BIOPSY EXCISION performed by Joyce Washington MD at [address]      as a child    URETER STENT PLACEMENT Left 09/30/2015    tiems 2 stents    URETER STENT PLACEMENT Left 10/14/2022    HOLMIUM, CYSTOSCOPY, URETEROSCOPY, STENT PLACEMENT (Left)    URETER SURGERY Left 10/14/2022    HOLMIUM, CYSTOSCOPY, URETEROSCOPY, STENT PLACEMENT performed by Benito Arango MD at [address] [facility] History   Problem Relation Age of Onset    Heart Disease Mother     Diabetes Mother     Heart Disease Father     Diabetes Sister     Diabetes Maternal Grandmother     Diabetes Brother      Outpatient Medications Marked as Taking for the 10/24/22 encounter (Office Visit) with Benito Arango MD   Medication Sig Dispense Refill    tamsulosin (FLOMAX) 0.4 MG capsule Take 1 capsule by mouth daily 30 capsule 0    Insulin Lispro-aabc, 1 U Dial, (LYUMJEV KWIKPEN) 100 UNIT/ML SOPN Inject 7 Units into the skin 3 times daily (before meals)      docusate sodium (COLACE) 100 MG capsule Take 1 capsule by mouth 2 times daily 20 capsule 0    vitamin B-12 (CYANOCOBALAMIN) 100 MCG tablet Take 50 mcg by mouth daily      fenofibrate (TRICOR) 145 MG tablet TAKE 1 TABLET DAILY (Patient taking differently: daily) 90 tablet 3    B-D UF III MINI PEN NEEDLES 31G X 5 MM MISC INJECT 1 PEN NEEDLE INTO   THE SKIN DAILY 90 each 3    losartan (COZAAR) 50 MG tablet TAKE 1 TABLET DAILY 90 tablet 3    lovastatin (MEVACOR) 20 MG tablet TAKE 1 TABLET NIGHTLY (Patient taking differently: daily TAKE 1 TABLET NIGHTLY) 90 tablet 3    FARXIGA 5 MG tablet Take 10 mg by mouth every morning       glimepiride (AMARYL) 4 MG tablet Take 4 mg by mouth daily Take 2 tablets once daily      BASAGLAR KWIKPEN 100 UNIT/ML injection pen Inject 70 Units into the skin daily         Seasonal  Social History     Tobacco Use   Smoking Status Never   Smokeless Tobacco Never     (Ifpatient a smoker, smoking cessation counseling offered)    Social History     Substance and Sexual Activity   Alcohol Use Yes    Comment: once a week 1 beer       REVIEW OF SYSTEMS:  Review of Systems    Physical Exam:      Vitals:    10/24/22 1332   BP: (!) 140/80   Pulse: 72   Temp: 97.5 °F (36.4 °C)   SpO2: 95%     Body mass index is 30.65 kg/m². Patient is a 79 y.o. male in no acute distress and alert and oriented to person, place and time. Physical Exam  Constitutional: Patient in no acute distress. Neuro: Alert and oriented to person, place and time. Psych: Mood normal, affect normal  Skin: No rash noted  HEENT: Head: Normocephalic andatraumatic  Conjunctivae and EOM are normal. Pupils are equal, round  Nose:Normal  Right External Ear: Normal; Left External Ear: Normal  Mouth: Mucosa Moist  Neck: Supple      Assessment and Plan      1. Kidney stones    2. BPH with obstruction/lower urinary tract symptoms           Plan:   F/u 6 mo kub  Cont flomax      Return in about 6 months (around 4/24/2023) for kub.     Prescriptions Ordered:  No orders of the defined types were placed in this encounter. Orders Placed:  Orders Placed This Encounter   Procedures    XR ABDOMEN (KUB) (SINGLE AP VIEW)     Standing Status:   Future     Standing Expiration Date:   10/24/2023             Daryl Cadena MD    Agree with the ROS entered by the MA.

## 2022-11-22 ENCOUNTER — HOSPITAL ENCOUNTER (OUTPATIENT)
Dept: CT IMAGING | Age: 67
Discharge: HOME | End: 2022-11-24
Payer: MEDICARE

## 2022-11-22 DIAGNOSIS — R91.8 LUNG NODULES: ICD-10-CM

## 2022-11-22 PROCEDURE — 71250 CT THORAX DX C-: CPT

## 2022-12-27 RX ORDER — LOVASTATIN 20 MG/1
TABLET ORAL
Qty: 90 TABLET | Refills: 0 | Status: SHIPPED | OUTPATIENT
Start: 2022-12-27 | End: 2023-02-02 | Stop reason: SDUPTHER

## 2023-01-24 RX ORDER — MONTELUKAST SODIUM 10 MG/1
TABLET ORAL
Qty: 90 TABLET | Refills: 3 | OUTPATIENT
Start: 2023-01-24

## 2023-01-26 RX ORDER — LOVASTATIN 20 MG/1
TABLET ORAL
Qty: 90 TABLET | Refills: 0 | Status: CANCELLED | OUTPATIENT
Start: 2023-01-26

## 2023-01-26 NOTE — TELEPHONE ENCOUNTER
Patient confirmed he is no longer taking Singular. He will call pharmacy and ask them to stop sending refill requests to us.

## 2023-01-26 NOTE — TELEPHONE ENCOUNTER
[id number] [facility] called asking for lovastatin to be sent to them. VM left for pt to return the call. Need to verify if he wants this to go to them and if he needs a short term supply?

## 2023-02-02 RX ORDER — LOVASTATIN 20 MG/1
TABLET ORAL
Qty: 90 TABLET | Refills: 3 | Status: SHIPPED | OUTPATIENT
Start: 2023-02-02

## 2023-02-09 RX ORDER — MONTELUKAST SODIUM 10 MG/1
10 TABLET ORAL NIGHTLY
Qty: 90 TABLET | Refills: 3 | OUTPATIENT
Start: 2023-02-09

## 2023-02-24 ENCOUNTER — HOSPITAL ENCOUNTER (OUTPATIENT)
Age: 68
Setting detail: SPECIMEN
Discharge: HOME | End: 2023-02-24

## 2023-02-24 ENCOUNTER — OFFICE VISIT (OUTPATIENT)
Dept: PRIMARY CARE CLINIC | Age: 68
End: 2023-02-24

## 2023-02-24 VITALS
HEIGHT: 72 IN | HEART RATE: 90 BPM | OXYGEN SATURATION: 97 % | TEMPERATURE: 97.6 F | WEIGHT: 225 LBS | DIASTOLIC BLOOD PRESSURE: 78 MMHG | BODY MASS INDEX: 30.48 KG/M2 | SYSTOLIC BLOOD PRESSURE: 136 MMHG

## 2023-02-24 DIAGNOSIS — Z11.52 ENCOUNTER FOR SCREENING FOR COVID-19: ICD-10-CM

## 2023-02-24 DIAGNOSIS — Z79.4 TYPE 2 DIABETES MELLITUS WITHOUT COMPLICATION, WITH LONG-TERM CURRENT USE OF INSULIN (HCC): ICD-10-CM

## 2023-02-24 DIAGNOSIS — J06.9 UPPER RESPIRATORY TRACT INFECTION, UNSPECIFIED TYPE: Primary | ICD-10-CM

## 2023-02-24 DIAGNOSIS — E11.9 TYPE 2 DIABETES MELLITUS WITHOUT COMPLICATION, WITH LONG-TERM CURRENT USE OF INSULIN (HCC): ICD-10-CM

## 2023-02-24 LAB
INFLUENZA A ANTIBODY: NORMAL
INFLUENZA B ANTIBODY: NORMAL

## 2023-02-24 RX ORDER — MONTELUKAST SODIUM 10 MG/1
10 TABLET ORAL NIGHTLY
COMMUNITY

## 2023-02-24 RX ORDER — AZITHROMYCIN 250 MG/1
250 TABLET, FILM COATED ORAL SEE ADMIN INSTRUCTIONS
Qty: 6 TABLET | Refills: 0 | Status: SHIPPED | OUTPATIENT
Start: 2023-02-24 | End: 2023-03-01

## 2023-02-24 ASSESSMENT — ENCOUNTER SYMPTOMS
SHORTNESS OF BREATH: 0
COUGH: 1

## 2023-02-24 NOTE — PROGRESS NOTES
Evelia Royal is a 76 y.o. Sasha Locus presents today for his medical conditions/complaints as noted below. Chief Complaint   Patient presents with    Cough     Pt productive cough with green phlegm, congestion, sore throat, headache and body aches. X4 days         HPI:     HPI  Has  body aches, he denies any fever or chills. He does state he has been more sweaty than usual, stephanie along his back. Having difficulty sleeping due to the cough. Started with a sore throat 4 days ago. Colored phlegm    Spouse is also ill. Current Outpatient Medications   Medication Sig Dispense Refill    montelukast (SINGULAIR) 10 MG tablet Take 10 mg by mouth nightly      azithromycin (ZITHROMAX) 250 MG tablet Take 1 tablet by mouth See Admin Instructions for 5 days 500mg on day 1 followed by 250mg on days 2 - 5 6 tablet 0    lovastatin (MEVACOR) 20 MG tablet TAKE 1 TABLET NIGHTLY 90 tablet 3    Insulin Lispro-aabc, 1 U Dial, (LYUMJEV KWIKPEN) 100 UNIT/ML SOPN Inject 7 Units into the skin 3 times daily (before meals)      docusate sodium (COLACE) 100 MG capsule Take 1 capsule by mouth 2 times daily 20 capsule 0    vitamin B-12 (CYANOCOBALAMIN) 100 MCG tablet Take 50 mcg by mouth daily      B-D UF III MINI PEN NEEDLES 31G X 5 MM MISC INJECT 1 PEN NEEDLE INTO   THE SKIN DAILY 90 each 3    losartan (COZAAR) 50 MG tablet TAKE 1 TABLET DAILY 90 tablet 3    FARXIGA 5 MG tablet Take 10 mg by mouth every morning       glimepiride (AMARYL) 4 MG tablet Take 4 mg by mouth daily Take 2 tablets once daily      BASAGLAR KWIKPEN 100 UNIT/ML injection pen Inject 70 Units into the skin daily       No current facility-administered medications for this visit. Allergies   Allergen Reactions    Seasonal        Subjective:     Review of Systems   Constitutional:  Positive for diaphoresis. Respiratory:  Positive for cough. Negative for shortness of breath.       Objective:     /78   Pulse 90   Temp 97.6 °F (36.4 °C) (Oral)   Ht 6' (1.829 m)   Wt 225 lb (102.1 kg)   SpO2 97%   BMI 30.52 kg/m²   Physical Exam  Vitals and nursing note reviewed. Constitutional:       General: He is not in acute distress. Appearance: He is well-developed. He is diaphoretic. He is not ill-appearing. Comments: Mild sweating along his trunk   HENT:      Head: Normocephalic and atraumatic. Right Ear: Tympanic membrane, ear canal and external ear normal.      Left Ear: Tympanic membrane, ear canal and external ear normal.      Ears:      Comments: Has hearing aids  Eyes:      General: No scleral icterus. Right eye: No discharge. Left eye: No discharge. Conjunctiva/sclera: Conjunctivae normal.   Neck:      Thyroid: No thyromegaly. Trachea: No tracheal deviation. Cardiovascular:      Rate and Rhythm: Normal rate and regular rhythm. Heart sounds: Normal heart sounds. Pulmonary:      Effort: Pulmonary effort is normal. No respiratory distress. Breath sounds: Normal breath sounds. No wheezing. Lymphadenopathy:      Cervical: No cervical adenopathy. Skin:     General: Skin is warm. Findings: No rash. Neurological:      Mental Status: He is alert and oriented to person, place, and time. Psychiatric:         Mood and Affect: Mood normal.         Behavior: Behavior normal.         Thought Content: Thought content normal.       Assessment:       Diagnosis Orders   1. Upper respiratory tract infection, unspecified type  POCT Influenza A/B    COVID-19    azithromycin (ZITHROMAX) 250 MG tablet      2. Encounter for screening for COVID-19  COVID-19      3. Type 2 diabetes mellitus without complication, with long-term current use of [facility]             Plan:      No follow-ups on file.     Orders Placed This Encounter   Procedures    COVID-19     Standing Status:   Future     Standing Expiration Date:   2/24/2024     Scheduling Instructions:      1) Due to current limited availability of the COVID-19 test, tests will be prioritized based on responses to questions above. Testing may be delayed due to volume. 2) Print and instruct patient to adhere to CDC home isolation program. (Link Above)              3) Set up or refer patient for a monitoring program.              4) Have patient sign up for and leverage MyChart (if not previously done). Order Specific Question:   Is this test for diagnosis or screening? Answer:   Diagnosis of ill patient     Order Specific Question:   Symptomatic for COVID-19 as defined by CDC? Answer:   Yes     Order Specific Question:   Date of Symptom Onset     Answer:   2/20/2023     Order Specific Question:   Hospitalized for COVID-19? Answer:   No     Order Specific Question:   Admitted to ICU for COVID-19? Answer:   No     Order Specific Question:   Employed in healthcare setting? Answer:   No     Order Specific Question:   Resident in a congregate (group) care setting? Answer:   No     Order Specific Question:   Previously tested for COVID-19? Answer:   Yes     Order Specific Question:   Pregnant: Answer:   No    POCT Influenza A/B     Orders Placed This Encounter   Medications    azithromycin (ZITHROMAX) 250 MG tablet     Sig: Take 1 tablet by mouth See Admin Instructions for 5 days 500mg on day 1 followed by 250mg on days 2 - 5     Dispense:  6 tablet     Refill:  0      Reviewed medications and possibleside effects.        Electronically signed by Loraine Graff MD on 2/24/2023 at 12:02 PM

## 2023-02-25 DIAGNOSIS — J06.9 UPPER RESPIRATORY TRACT INFECTION, UNSPECIFIED TYPE: ICD-10-CM

## 2023-02-25 DIAGNOSIS — Z11.52 ENCOUNTER FOR SCREENING FOR COVID-19: ICD-10-CM

## 2023-02-25 LAB
SARS-COV-2 RNA RESP QL NAA+PROBE: NORMAL
SARS-COV-2 RNA RESP QL NAA+PROBE: NOT DETECTED
SOURCE: NORMAL

## 2023-04-24 ENCOUNTER — HOSPITAL ENCOUNTER (OUTPATIENT)
Age: 68
Discharge: HOME | End: 2023-04-24
Payer: MEDICARE

## 2023-04-24 ENCOUNTER — HOSPITAL ENCOUNTER (OUTPATIENT)
Age: 68
Discharge: HOME | End: 2023-04-26
Payer: MEDICARE

## 2023-04-24 ENCOUNTER — HOSPITAL ENCOUNTER (OUTPATIENT)
Dept: GENERAL RADIOLOGY | Age: 68
Discharge: HOME | End: 2023-04-26
Payer: MEDICARE

## 2023-04-24 ENCOUNTER — OFFICE VISIT (OUTPATIENT)
Dept: UROLOGY | Age: 68
End: 2023-04-24
Payer: MEDICARE

## 2023-04-24 VITALS
BODY MASS INDEX: 30.48 KG/M2 | HEIGHT: 72 IN | SYSTOLIC BLOOD PRESSURE: 122 MMHG | WEIGHT: 225 LBS | DIASTOLIC BLOOD PRESSURE: 74 MMHG | HEART RATE: 80 BPM | TEMPERATURE: 97 F

## 2023-04-24 DIAGNOSIS — N20.0 KIDNEY STONES: ICD-10-CM

## 2023-04-24 DIAGNOSIS — R35.0 URINARY FREQUENCY: ICD-10-CM

## 2023-04-24 DIAGNOSIS — R39.15 URGENCY OF URINATION: ICD-10-CM

## 2023-04-24 DIAGNOSIS — Z12.5 PROSTATE CANCER SCREENING: ICD-10-CM

## 2023-04-24 DIAGNOSIS — N40.1 BPH WITH OBSTRUCTION/LOWER URINARY TRACT SYMPTOMS: Primary | ICD-10-CM

## 2023-04-24 DIAGNOSIS — R10.9 LEFT FLANK PAIN: ICD-10-CM

## 2023-04-24 DIAGNOSIS — N13.8 BPH WITH OBSTRUCTION/LOWER URINARY TRACT SYMPTOMS: Primary | ICD-10-CM

## 2023-04-24 LAB
ALBUMIN SERPL-MCNC: 4.5 G/DL (ref 3.5–5.2)
ALP SERPL-CCNC: 56 U/L (ref 40–129)
ALT SERPL-CCNC: 27 U/L (ref 5–41)
ANION GAP SERPL CALCULATED.3IONS-SCNC: 14 MMOL/L (ref 9–17)
AST SERPL-CCNC: 20 U/L
BILIRUB SERPL-MCNC: 0.4 MG/DL (ref 0.3–1.2)
BUN SERPL-MCNC: 19 MG/DL (ref 8–23)
CALCIUM SERPL-MCNC: 9.5 MG/DL (ref 8.6–10.4)
CHLORIDE SERPL-SCNC: 102 MMOL/L (ref 98–107)
CHOLEST SERPL-MCNC: 182 MG/DL
CHOLESTEROL/HDL RATIO: 9.6
CO2 SERPL-SCNC: 21 MMOL/L (ref 20–31)
CREAT SERPL-MCNC: 1.01 MG/DL (ref 0.7–1.2)
CREATININE URINE: 70.7 MG/DL (ref 39–259)
GFR SERPL CREATININE-BSD FRML MDRD: >60 ML/MIN/1.73M2
GLUCOSE SERPL-MCNC: 232 MG/DL (ref 70–99)
HDLC SERPL-MCNC: 19 MG/DL
LDLC SERPL CALC-MCNC: ABNORMAL MG/DL (ref 0–130)
LDLC SERPL DIRECT ASSAY-MCNC: 38 MG/DL
MICROALBUMIN/CREAT 24H UR: 54 MG/L
MICROALBUMIN/CREAT UR-RTO: 76 MCG/MG CREAT
POTASSIUM SERPL-SCNC: 4.3 MMOL/L (ref 3.7–5.3)
PROT SERPL-MCNC: 7.4 G/DL (ref 6.4–8.3)
SODIUM SERPL-SCNC: 137 MMOL/L (ref 135–144)
TRIGL SERPL-MCNC: 1250 MG/DL
TSH SERPL-ACNC: 1.66 UIU/ML (ref 0.3–5)

## 2023-04-24 PROCEDURE — 3074F SYST BP LT 130 MM HG: CPT | Performed by: UROLOGY

## 2023-04-24 PROCEDURE — 80053 COMPREHEN METABOLIC PANEL: CPT

## 2023-04-24 PROCEDURE — G8427 DOCREV CUR MEDS BY ELIG CLIN: HCPCS | Performed by: UROLOGY

## 2023-04-24 PROCEDURE — 36415 COLL VENOUS BLD VENIPUNCTURE: CPT

## 2023-04-24 PROCEDURE — 99214 OFFICE O/P EST MOD 30 MIN: CPT | Performed by: UROLOGY

## 2023-04-24 PROCEDURE — 84260 ASSAY OF SEROTONIN: CPT

## 2023-04-24 PROCEDURE — 82570 ASSAY OF URINE CREATININE: CPT

## 2023-04-24 PROCEDURE — 3078F DIAST BP <80 MM HG: CPT | Performed by: UROLOGY

## 2023-04-24 PROCEDURE — 1123F ACP DISCUSS/DSCN MKR DOCD: CPT | Performed by: UROLOGY

## 2023-04-24 PROCEDURE — 84443 ASSAY THYROID STIM HORMONE: CPT

## 2023-04-24 PROCEDURE — 80061 LIPID PANEL: CPT

## 2023-04-24 PROCEDURE — 83721 ASSAY OF BLOOD LIPOPROTEIN: CPT

## 2023-04-24 PROCEDURE — 3017F COLORECTAL CA SCREEN DOC REV: CPT | Performed by: UROLOGY

## 2023-04-24 PROCEDURE — 74018 RADEX ABDOMEN 1 VIEW: CPT

## 2023-04-24 PROCEDURE — G8417 CALC BMI ABV UP PARAM F/U: HCPCS | Performed by: UROLOGY

## 2023-04-24 PROCEDURE — 1036F TOBACCO NON-USER: CPT | Performed by: UROLOGY

## 2023-04-24 PROCEDURE — 82043 UR ALBUMIN QUANTITATIVE: CPT

## 2023-04-24 ASSESSMENT — ENCOUNTER SYMPTOMS
GASTROINTESTINAL NEGATIVE: 1
ABDOMINAL PAIN: 0
VOMITING: 0
NAUSEA: 0
COUGH: 0
BACK PAIN: 0
DIARRHEA: 0
EYE PAIN: 0
SHORTNESS OF BREATH: 0
EYES NEGATIVE: 1
WHEEZING: 0
RESPIRATORY NEGATIVE: 1
CONSTIPATION: 0
EYE REDNESS: 0

## 2023-04-24 NOTE — PROGRESS NOTES
Review of Systems   Constitutional: Negative. Negative for appetite change, chills and fatigue. Eyes: Negative. Negative for pain, redness and visual disturbance. Respiratory: Negative. Negative for cough, shortness of breath and wheezing. Cardiovascular: Negative. Negative for chest pain and leg swelling. Gastrointestinal: Negative. Negative for abdominal pain, constipation, diarrhea, nausea and vomiting. Genitourinary: Negative. Negative for difficulty urinating, dysuria, flank pain, frequency, hematuria and urgency. Musculoskeletal: Negative. Negative for back pain, joint swelling and myalgias. Skin: Negative. Negative for rash and wound. Neurological: Negative. Negative for dizziness, weakness and numbness. Hematological: Negative. Does not bruise/bleed easily.
meals)      docusate sodium (COLACE) 100 MG capsule Take 1 capsule by mouth 2 times daily 20 capsule 0    vitamin B-12 (CYANOCOBALAMIN) 100 MCG tablet Take 0.5 tablets by mouth daily      B-D UF III MINI PEN NEEDLES 31G X 5 MM MISC INJECT 1 PEN NEEDLE INTO   THE SKIN DAILY 90 each 3    losartan (COZAAR) 50 MG tablet TAKE 1 TABLET DAILY 90 tablet 3    FARXIGA 5 MG tablet Take 2 tablets by mouth every morning      glimepiride (AMARYL) 4 MG tablet Take 1 tablet by mouth daily Take 2 tablets once daily      BASAGLAR KWIKPEN 100 UNIT/ML injection pen Inject 70 Units into the skin daily         Seasonal  Social History     Tobacco Use   Smoking Status Never   Smokeless Tobacco Never     (Ifpatient a smoker, smoking cessation counseling offered)    Social History     Substance and Sexual Activity   Alcohol Use Yes    Comment: once a week 1 beer       REVIEW OF SYSTEMS:  Review of Systems    Physical Exam:      Vitals:    04/24/23 0908   BP: 122/74   Pulse: 80   Temp: 97 °F (36.1 °C)     Body mass index is 30.52 kg/m². Patient is a 76 y.o. male in no acute distress and alert and oriented to person, place and time. Physical Exam  Constitutional: Patient in no acute distress. Neuro: Alert and oriented to person, place and time. Psych: Mood normal, affect normal  Skin: No rash noted  HEENT: Head: Normocephalic andatraumatic  Conjunctivae and EOM are normal. Pupils are equal, round      Assessment and Plan      1. BPH with obstruction/lower urinary tract symptoms    2. Urgency of urination    3. Urinary frequency    4. Prostate cancer screening    5. Kidney stones    6. Left flank pain           Plan:   Cont flomax  Frequency likely due to [address]  Left ESWL  Psa prior to ESWL      Return for left eswl. Prescriptions Ordered:  No orders of the defined types were placed in this encounter.     Orders Placed:  Orders Placed This Encounter   Procedures    PSA, Diagnostic     Standing Status:   Future     Standing Expiration

## 2023-04-27 LAB — SEROTONIN BLOOD: 136 NG/ML (ref 50–220)

## 2023-05-03 ENCOUNTER — TELEPHONE (OUTPATIENT)
Dept: UROLOGY | Age: 68
End: 2023-05-03

## 2023-05-03 NOTE — TELEPHONE ENCOUNTER
(LT) ESWL @ [facility] 7/6/23 7:30am **STOP BLOOD THINNERS 6/29/23**   PAT @ [facility] 6/23/23 10:30am         Spoke with patient, procedure info emailed.

## 2023-05-08 ENCOUNTER — HOSPITAL ENCOUNTER (OUTPATIENT)
Age: 68
Discharge: HOME | End: 2023-05-08
Payer: MEDICARE

## 2023-05-08 DIAGNOSIS — Z12.5 PROSTATE CANCER SCREENING: ICD-10-CM

## 2023-05-08 LAB — PROSTATE SPECIFIC ANTIGEN: 0.21 NG/ML

## 2023-05-08 PROCEDURE — 36415 COLL VENOUS BLD VENIPUNCTURE: CPT

## 2023-05-08 PROCEDURE — G0103 PSA SCREENING: HCPCS

## 2023-05-15 ENCOUNTER — HOSPITAL ENCOUNTER (OUTPATIENT)
Age: 68
Setting detail: SPECIMEN
Discharge: HOME | End: 2023-05-15
Payer: MEDICARE

## 2023-05-15 LAB
CREATININE URINE: 52.3 MG/DL
CREATININE, 24H UR: 1491 MG/24 H (ref 1040–2350)
HOURS COLLECTED: 24 H
SPECIMEN VOL UR: 2850 ML

## 2023-05-15 PROCEDURE — 82570 ASSAY OF URINE CREATININE: CPT

## 2023-05-15 PROCEDURE — 83497 ASSAY OF 5-HIAA: CPT

## 2023-05-17 LAB
5 HIAA URINE (PER GM CREAT): 6 MG/GCR (ref 0–14)
5-HIAA INTERPRETATION: NORMAL
5-HIAA URINE: 3 MG/L
5-HIAA, URINE: 9 MG/D (ref 0–15)
CREATININE URINE /24 HR: 1454 MG/D (ref 800–2100)
CREATININE URINE /VOLUME: 51 MG/DL
HOURS COLLECTED: 24
URINE VOLUME: 2850

## 2023-06-20 RX ORDER — SODIUM CHLORIDE, SODIUM LACTATE, POTASSIUM CHLORIDE, CALCIUM CHLORIDE 600; 310; 30; 20 MG/100ML; MG/100ML; MG/100ML; MG/100ML
INJECTION, SOLUTION INTRAVENOUS CONTINUOUS
OUTPATIENT
Start: 2023-06-20

## 2023-06-20 NOTE — DISCHARGE INSTRUCTIONS
medications. Bring a list of all medications you take, along with the dose of the medications and how often you take it. If more convenient bring the pharmacy bottles in a zip lock bag. Please shower the night before and the morning of surgery with an antibacterial soap. Please use the wipes given to you the night before your surgery after your shower. Unless otherwise told by your physician, please do not shave legs or any part of your body below your neck the night before or day of your surgery. You may shave your face or neck. Brush your teeth but do not swallow water. Bring your inhaler if you are currently using one. Bring your eyeglasses and case with you. No contacts are to be worn the day of surgery. You also may bring your hearing aids. Bring your blood band if one has been given to you. Please do not close the clasp. If you are on C-PAP or Bi-PAP at home and plan on staying in the hospital overnight for your surgery please bring the machine with you. Do not wear any jewelry or body piercings day of surgery. Also, NO lotion, perfume or deodorant to be used the day of surgery. Do not bring any valuables, such as jewelry, cash or credit cards. If you are staying overnight with us, please bring a SMALL bag of personal items. We cannot accommodate large items, like suitcases. Please wear loose, comfortable clothing. If you are potentially going to have a cast or brace bring clothing that will fit over them.                                                                                                                           In case of illness - If you have cold or flu like symptoms (high fever, runny nose, sore throat, cough, etc.) rash, nausea, vomiting, loose stools, and/or recent contact with someone who has a contagious disease (chicken pox, measles, etc.) Please call your doctor before coming to the hospital.      If your child is having surgery please

## 2023-06-23 ENCOUNTER — HOSPITAL ENCOUNTER (OUTPATIENT)
Dept: PREADMISSION TESTING | Age: 68
End: 2023-06-23
Payer: MEDICARE

## 2023-06-23 VITALS
OXYGEN SATURATION: 95 % | HEART RATE: 81 BPM | TEMPERATURE: 97 F | SYSTOLIC BLOOD PRESSURE: 135 MMHG | BODY MASS INDEX: 31.02 KG/M2 | HEIGHT: 72 IN | RESPIRATION RATE: 20 BRPM | DIASTOLIC BLOOD PRESSURE: 86 MMHG | WEIGHT: 229 LBS

## 2023-06-23 LAB
ANION GAP SERPL CALCULATED.3IONS-SCNC: 14 MMOL/L (ref 9–17)
BUN SERPL-MCNC: 19 MG/DL (ref 8–23)
CHLORIDE SERPL-SCNC: 107 MMOL/L (ref 98–107)
CO2 SERPL-SCNC: 21 MMOL/L (ref 20–31)
CREAT SERPL-MCNC: 1.03 MG/DL (ref 0.7–1.2)
ERYTHROCYTE [DISTWIDTH] IN BLOOD BY AUTOMATED COUNT: 12.5 % (ref 11.8–14.4)
GFR SERPL CREATININE-BSD FRML MDRD: >60 ML/MIN/1.73M2
GLUCOSE SERPL-MCNC: 162 MG/DL (ref 70–99)
HCT VFR BLD AUTO: 45.5 % (ref 40.7–50.3)
HGB BLD-MCNC: 15.9 G/DL (ref 13–17)
MCH RBC QN AUTO: 30.5 PG (ref 25.2–33.5)
MCHC RBC AUTO-ENTMCNC: 34.9 G/DL (ref 28.4–34.8)
MCV RBC AUTO: 87.3 FL (ref 82.6–102.9)
NRBC AUTOMATED: 0 PER 100 WBC
PLATELET # BLD AUTO: 204 K/UL (ref 138–453)
PMV BLD AUTO: 10.4 FL (ref 8.1–13.5)
POTASSIUM SERPL-SCNC: 4.3 MMOL/L (ref 3.7–5.3)
RBC # BLD AUTO: 5.21 M/UL (ref 4.21–5.77)
SODIUM SERPL-SCNC: 142 MMOL/L (ref 135–144)
WBC OTHER # BLD: 7.7 K/UL (ref 3.5–11.3)

## 2023-06-23 PROCEDURE — 36415 COLL VENOUS BLD VENIPUNCTURE: CPT

## 2023-06-23 PROCEDURE — 80051 ELECTROLYTE PANEL: CPT

## 2023-06-23 PROCEDURE — 93005 ELECTROCARDIOGRAM TRACING: CPT | Performed by: STUDENT IN AN ORGANIZED HEALTH CARE EDUCATION/TRAINING PROGRAM

## 2023-06-23 PROCEDURE — 82565 ASSAY OF CREATININE: CPT

## 2023-06-23 PROCEDURE — 87086 URINE CULTURE/COLONY COUNT: CPT

## 2023-06-23 PROCEDURE — 82947 ASSAY GLUCOSE BLOOD QUANT: CPT

## 2023-06-23 PROCEDURE — 85027 COMPLETE CBC AUTOMATED: CPT

## 2023-06-23 PROCEDURE — 84520 ASSAY OF UREA NITROGEN: CPT

## 2023-06-23 RX ORDER — ACETAMINOPHEN 500 MG
500 TABLET ORAL EVERY 6 HOURS PRN
COMMUNITY

## 2023-06-23 RX ORDER — FENOFIBRATE 145 MG/1
145 TABLET, COATED ORAL DAILY
COMMUNITY

## 2023-06-23 RX ORDER — IBUPROFEN 200 MG
200 TABLET ORAL EVERY 6 HOURS PRN
COMMUNITY

## 2023-06-23 RX ORDER — ASPIRIN 81 MG/1
81 TABLET ORAL DAILY
COMMUNITY

## 2023-06-23 RX ORDER — CHLORAL HYDRATE 500 MG
CAPSULE ORAL DAILY
COMMUNITY

## 2023-06-23 RX ORDER — SITAGLIPTIN AND METFORMIN HYDROCHLORIDE 1000; 50 MG/1; MG/1
1 TABLET, FILM COATED, EXTENDED RELEASE ORAL DAILY
COMMUNITY
Start: 2023-04-18

## 2023-06-23 NOTE — H&P
History and Physical    Pt Name: Lee Sanchez  MRN: [id number]  YOB: 1955  Date of evaluation: 6/23/2023    SUBJECTIVE:   History of Chief Complaint:    Patient presents for PAT appointment. He reports renal stones, has had this in the past, history of lithotripsy. He currently complains of BPH and renal stones, has only occasional mild pain. He has been scheduled for left ESWL. Past Medical History    has a past medical history of Asthma, Benign bladder tumor, Bilateral hearing loss, BPH (benign prostatic hyperplasia), Cancer ([address] Carcinoid tumor of lung, Cataract, GERD (gastroesophageal reflux disease), H/O arthroscopy of left knee, H/O seasonal allergies, Hearing aid worn, Hematuria, Hx of lithotripsy, Hyperlipidemia, Hypertension, IBS (irritable bowel syndrome), Kidney stone, Left ureteral stone, Neoplasm of unspecified nature of bone, soft tissue, and skin, Osteoarthritis, Snores, SOB (shortness of breath), Type II or unspecified type diabetes mellitus without mention of complication, not stated as uncontrolled, Under care of team, Under care of team, Under care of team, Ureteral stent retained, and Wears glasses. Past Surgical History   has a past surgical history that includes bladder tumor excision (2009); Lung surgery (Right, 05/23/2011); pre-malignant / benign skin lesion excision (Left, 06/09/2014); shoulder surgery (Right); shoulder surgery (Left); Cystoscopy; bronchoscopy; Knee arthroscopy; Cystoscopy (Left, 09/21/2015); Cystoscopy (09/30/2015); Ureter stent placement (Left, 09/30/2015); Lithotripsy (09/30/2015); pr lithotripsy xtrcorp shock wave (Left, 01/10/2018); cysto/uretero/pyeloscopy, calculus tx (Right, 06/17/2020); Colonoscopy (09/08/2015); hernia repair (Left); Tonsillectomy; eye surgery; Lithotripsy (Left, 03/05/2021); skin biopsy (11/12/2007); skin biopsy (12/09/2009); skin biopsy (02/10/2010); skin biopsy (N/A, 09/29/2021);  Inguinal hernia repair (Bilateral, 09/28/2022);

## 2023-06-23 NOTE — PROGRESS NOTES
Anesthesia Focused Assessment    STOP-BANG Sleep Apnea Questionnaire    SNORE loudly (heard through closed doors)? Yes  TIRED, fatigued, sleepy during daytime? No  OBSERVED stopping breathing during sleep? No  High blood PRESSURE being treated? Yes    BMI over 35? No  AGE over 48? Yes  NECK circumference over 16\"? No  GENDER (male)? Yes             Total 4  High risk 5-8  Intermediate risk 3-4  Low risk 0-2    Obstructive Sleep Apnea: denies  If YES, machine used: no     Type 1 DM:   no  T2DM:  yes    Coronary Artery Disease:  no  Hypertension:  yes    Active smoker:  no  Drinks Alcohol:  no    Dentition: benign    Defib / AICD / Pacemaker: no      Renal Failure/dialysis:  no    Patient was evaluated in PAT & anesthesia guidelines were applied. NPO guidelines, medication instructions and scheduled arrival time were reviewed with patient. I advised patient to please contact the surgeon's office, ahead of time if possible, if any new signs or symptoms of illness, infection, rash, etc    Hx of anesthesia complications:  no  Family hx of anesthesia complications:  no                                                                                                                     Anesthesia contacted:   no  Medical or cardiac clearance ordered: no.  Patient is active without cardiac or pulmonary complaints.     Deniz Haynes PA-C  6/23/23  10:23 AM

## 2023-06-24 LAB
MICROORGANISM SPEC CULT: NORMAL
SPECIMEN DESCRIPTION: NORMAL

## 2023-06-25 LAB
EKG ATRIAL RATE: 76 BPM
EKG P AXIS: 20 DEGREES
EKG P-R INTERVAL: 208 MS
EKG Q-T INTERVAL: 402 MS
EKG QRS DURATION: 94 MS
EKG QTC CALCULATION (BAZETT): 452 MS
EKG R AXIS: 88 DEGREES
EKG T AXIS: 51 DEGREES
EKG VENTRICULAR RATE: 76 BPM

## 2023-07-06 ENCOUNTER — ANESTHESIA (OUTPATIENT)
Dept: OPERATING ROOM | Age: 68
End: 2023-07-06
Payer: MEDICARE

## 2023-07-06 ENCOUNTER — HOSPITAL ENCOUNTER (OUTPATIENT)
Age: 68
Setting detail: OUTPATIENT SURGERY
Discharge: HOME | End: 2023-07-06
Attending: UROLOGY | Admitting: UROLOGY
Payer: MEDICARE

## 2023-07-06 ENCOUNTER — APPOINTMENT (OUTPATIENT)
Dept: GENERAL RADIOLOGY | Age: 68
End: 2023-07-06
Attending: UROLOGY
Payer: MEDICARE

## 2023-07-06 ENCOUNTER — ANESTHESIA EVENT (OUTPATIENT)
Dept: OPERATING ROOM | Age: 68
End: 2023-07-06
Payer: MEDICARE

## 2023-07-06 VITALS
TEMPERATURE: 97 F | DIASTOLIC BLOOD PRESSURE: 62 MMHG | WEIGHT: 230 LBS | RESPIRATION RATE: 14 BRPM | HEIGHT: 72 IN | HEART RATE: 79 BPM | OXYGEN SATURATION: 95 % | BODY MASS INDEX: 31.15 KG/M2 | SYSTOLIC BLOOD PRESSURE: 128 MMHG

## 2023-07-06 DIAGNOSIS — G89.18 ACUTE POST-OPERATIVE PAIN: Primary | ICD-10-CM

## 2023-07-06 LAB
GLUCOSE BLD-MCNC: 171 MG/DL (ref 75–110)
GLUCOSE BLD-MCNC: 173 MG/DL (ref 75–110)

## 2023-07-06 PROCEDURE — 74018 RADEX ABDOMEN 1 VIEW: CPT

## 2023-07-06 PROCEDURE — 7100000000 HC PACU RECOVERY - FIRST 15 MIN: Performed by: UROLOGY

## 2023-07-06 PROCEDURE — 2580000003 HC RX 258: Performed by: UROLOGY

## 2023-07-06 PROCEDURE — 82947 ASSAY GLUCOSE BLOOD QUANT: CPT

## 2023-07-06 PROCEDURE — 7100000010 HC PHASE II RECOVERY - FIRST 15 MIN: Performed by: UROLOGY

## 2023-07-06 PROCEDURE — 2580000003 HC RX 258: Performed by: STUDENT IN AN ORGANIZED HEALTH CARE EDUCATION/TRAINING PROGRAM

## 2023-07-06 PROCEDURE — 2500000003 HC RX 250 WO HCPCS

## 2023-07-06 PROCEDURE — 2709999900 HC NON-CHARGEABLE SUPPLY: Performed by: UROLOGY

## 2023-07-06 PROCEDURE — 3700000000 HC ANESTHESIA ATTENDED CARE: Performed by: UROLOGY

## 2023-07-06 PROCEDURE — 3700000001 HC ADD 15 MINUTES (ANESTHESIA): Performed by: UROLOGY

## 2023-07-06 PROCEDURE — 6360000002 HC RX W HCPCS

## 2023-07-06 PROCEDURE — 7100000001 HC PACU RECOVERY - ADDTL 15 MIN: Performed by: UROLOGY

## 2023-07-06 PROCEDURE — 7100000011 HC PHASE II RECOVERY - ADDTL 15 MIN: Performed by: UROLOGY

## 2023-07-06 PROCEDURE — 3600000012 HC SURGERY LEVEL 2 ADDTL 15MIN: Performed by: UROLOGY

## 2023-07-06 PROCEDURE — 3600000002 HC SURGERY LEVEL 2 BASE: Performed by: UROLOGY

## 2023-07-06 RX ORDER — PHENYLEPHRINE HCL IN 0.9% NACL 1 MG/10 ML
SYRINGE (ML) INTRAVENOUS PRN
Status: DISCONTINUED | OUTPATIENT
Start: 2023-07-06 | End: 2023-07-06 | Stop reason: SDUPTHER

## 2023-07-06 RX ORDER — MAGNESIUM HYDROXIDE 1200 MG/15ML
LIQUID ORAL PRN
Status: DISCONTINUED | OUTPATIENT
Start: 2023-07-06 | End: 2023-07-06 | Stop reason: HOSPADM

## 2023-07-06 RX ORDER — LIDOCAINE HYDROCHLORIDE 10 MG/ML
INJECTION, SOLUTION EPIDURAL; INFILTRATION; INTRACAUDAL; PERINEURAL PRN
Status: DISCONTINUED | OUTPATIENT
Start: 2023-07-06 | End: 2023-07-06 | Stop reason: SDUPTHER

## 2023-07-06 RX ORDER — SODIUM CHLORIDE 0.9 % (FLUSH) 0.9 %
5-40 SYRINGE (ML) INJECTION PRN
Status: DISCONTINUED | OUTPATIENT
Start: 2023-07-06 | End: 2023-07-06 | Stop reason: HOSPADM

## 2023-07-06 RX ORDER — TAMSULOSIN HYDROCHLORIDE 0.4 MG/1
0.4 CAPSULE ORAL DAILY
Qty: 30 CAPSULE | Refills: 0 | Status: SHIPPED | OUTPATIENT
Start: 2023-07-06

## 2023-07-06 RX ORDER — FENTANYL CITRATE 50 UG/ML
INJECTION, SOLUTION INTRAMUSCULAR; INTRAVENOUS PRN
Status: DISCONTINUED | OUTPATIENT
Start: 2023-07-06 | End: 2023-07-06 | Stop reason: SDUPTHER

## 2023-07-06 RX ORDER — SODIUM CHLORIDE, SODIUM LACTATE, POTASSIUM CHLORIDE, CALCIUM CHLORIDE 600; 310; 30; 20 MG/100ML; MG/100ML; MG/100ML; MG/100ML
INJECTION, SOLUTION INTRAVENOUS CONTINUOUS
Status: DISCONTINUED | OUTPATIENT
Start: 2023-07-06 | End: 2023-07-06 | Stop reason: HOSPADM

## 2023-07-06 RX ORDER — DEXAMETHASONE SODIUM PHOSPHATE 10 MG/ML
INJECTION INTRAMUSCULAR; INTRAVENOUS PRN
Status: DISCONTINUED | OUTPATIENT
Start: 2023-07-06 | End: 2023-07-06 | Stop reason: SDUPTHER

## 2023-07-06 RX ORDER — PROPOFOL 10 MG/ML
INJECTION, EMULSION INTRAVENOUS PRN
Status: DISCONTINUED | OUTPATIENT
Start: 2023-07-06 | End: 2023-07-06 | Stop reason: SDUPTHER

## 2023-07-06 RX ORDER — SUCCINYLCHOLINE/SOD CL,ISO/PF 100 MG/5ML
SYRINGE (ML) INTRAVENOUS PRN
Status: DISCONTINUED | OUTPATIENT
Start: 2023-07-06 | End: 2023-07-06 | Stop reason: SDUPTHER

## 2023-07-06 RX ORDER — ONDANSETRON 2 MG/ML
INJECTION INTRAMUSCULAR; INTRAVENOUS PRN
Status: DISCONTINUED | OUTPATIENT
Start: 2023-07-06 | End: 2023-07-06 | Stop reason: SDUPTHER

## 2023-07-06 RX ORDER — SODIUM CHLORIDE 9 MG/ML
INJECTION, SOLUTION INTRAVENOUS PRN
Status: DISCONTINUED | OUTPATIENT
Start: 2023-07-06 | End: 2023-07-06 | Stop reason: HOSPADM

## 2023-07-06 RX ORDER — DROPERIDOL 2.5 MG/ML
0.62 INJECTION, SOLUTION INTRAMUSCULAR; INTRAVENOUS
Status: DISCONTINUED | OUTPATIENT
Start: 2023-07-06 | End: 2023-07-06 | Stop reason: HOSPADM

## 2023-07-06 RX ORDER — METOCLOPRAMIDE HYDROCHLORIDE 5 MG/ML
10 INJECTION INTRAMUSCULAR; INTRAVENOUS
Status: DISCONTINUED | OUTPATIENT
Start: 2023-07-06 | End: 2023-07-06 | Stop reason: HOSPADM

## 2023-07-06 RX ORDER — HYDROCODONE BITARTRATE AND ACETAMINOPHEN 5; 325 MG/1; MG/1
1 TABLET ORAL EVERY 4 HOURS PRN
Qty: 6 TABLET | Refills: 0 | Status: SHIPPED | OUTPATIENT
Start: 2023-07-06 | End: 2023-07-09

## 2023-07-06 RX ORDER — SODIUM CHLORIDE 0.9 % (FLUSH) 0.9 %
5-40 SYRINGE (ML) INJECTION EVERY 12 HOURS SCHEDULED
Status: DISCONTINUED | OUTPATIENT
Start: 2023-07-06 | End: 2023-07-06 | Stop reason: HOSPADM

## 2023-07-06 RX ORDER — EPHEDRINE SULFATE/0.9% NACL/PF 50 MG/5 ML
SYRINGE (ML) INTRAVENOUS PRN
Status: DISCONTINUED | OUTPATIENT
Start: 2023-07-06 | End: 2023-07-06 | Stop reason: SDUPTHER

## 2023-07-06 RX ORDER — DIPHENHYDRAMINE HYDROCHLORIDE 50 MG/ML
12.5 INJECTION INTRAMUSCULAR; INTRAVENOUS
Status: DISCONTINUED | OUTPATIENT
Start: 2023-07-06 | End: 2023-07-06 | Stop reason: HOSPADM

## 2023-07-06 RX ORDER — MEPERIDINE HYDROCHLORIDE 50 MG/ML
12.5 INJECTION INTRAMUSCULAR; INTRAVENOUS; SUBCUTANEOUS EVERY 5 MIN PRN
Status: DISCONTINUED | OUTPATIENT
Start: 2023-07-06 | End: 2023-07-06 | Stop reason: HOSPADM

## 2023-07-06 RX ORDER — HYDRALAZINE HYDROCHLORIDE 20 MG/ML
10 INJECTION INTRAMUSCULAR; INTRAVENOUS
Status: DISCONTINUED | OUTPATIENT
Start: 2023-07-06 | End: 2023-07-06 | Stop reason: HOSPADM

## 2023-07-06 RX ORDER — CEFADROXIL 500 MG/1
500 CAPSULE ORAL 2 TIMES DAILY
Qty: 6 CAPSULE | Refills: 0 | Status: SHIPPED | OUTPATIENT
Start: 2023-07-06 | End: 2023-07-09

## 2023-07-06 RX ADMIN — PROPOFOL 200 MG: 10 INJECTION, EMULSION INTRAVENOUS at 07:52

## 2023-07-06 RX ADMIN — SODIUM CHLORIDE, POTASSIUM CHLORIDE, SODIUM LACTATE AND CALCIUM CHLORIDE: 600; 310; 30; 20 INJECTION, SOLUTION INTRAVENOUS at 06:56

## 2023-07-06 RX ADMIN — DEXAMETHASONE SODIUM PHOSPHATE 10 MG: 10 INJECTION INTRAMUSCULAR; INTRAVENOUS at 08:02

## 2023-07-06 RX ADMIN — Medication 5 MG: at 08:32

## 2023-07-06 RX ADMIN — Medication 2 G: at 08:11

## 2023-07-06 RX ADMIN — Medication 200 MG: at 07:52

## 2023-07-06 RX ADMIN — FENTANYL CITRATE 100 MCG: 50 INJECTION, SOLUTION INTRAMUSCULAR; INTRAVENOUS at 07:52

## 2023-07-06 RX ADMIN — Medication 100 MCG: at 08:23

## 2023-07-06 RX ADMIN — Medication 100 MCG: at 08:30

## 2023-07-06 RX ADMIN — ONDANSETRON 4 MG: 2 INJECTION INTRAMUSCULAR; INTRAVENOUS at 08:46

## 2023-07-06 RX ADMIN — Medication 100 MCG: at 08:18

## 2023-07-06 RX ADMIN — LIDOCAINE HYDROCHLORIDE 50 MG: 10 INJECTION, SOLUTION EPIDURAL; INFILTRATION; INTRACAUDAL; PERINEURAL at 07:52

## 2023-07-06 ASSESSMENT — PAIN DESCRIPTION - DESCRIPTORS: DESCRIPTORS: SORE

## 2023-07-06 ASSESSMENT — PAIN DESCRIPTION - LOCATION: LOCATION: NECK

## 2023-07-06 ASSESSMENT — ENCOUNTER SYMPTOMS: SHORTNESS OF BREATH: 1

## 2023-07-06 ASSESSMENT — PAIN - FUNCTIONAL ASSESSMENT: PAIN_FUNCTIONAL_ASSESSMENT: 0-10

## 2023-07-06 ASSESSMENT — PAIN SCALES - GENERAL: PAINLEVEL_OUTOF10: 1

## 2023-07-06 NOTE — OP NOTE
Operative Note      Patient: Cornelious Severs  YOB: 1955  MRN: [id number]    Date of Procedure: 7/6/2023    Pre-Op Diagnosis Codes:     * Kidney stone on left side [N20.0]    Post-Op Diagnosis: Same       Procedure(s):  ESWL EXTRACORPOREAL SHOCK WAVE LITHOTRIPSY    Surgeon(s):  Sudhir Mills MD    Assistant:   * No surgical staff found *    Anesthesia: General    Estimated Blood Loss (mL): Minimal    Complications: None    Specimens:   * No specimens in log *    Implants:  * No implants in log *      Drains: * No LDAs found *    Findings: Successful fragmentation of 7 mm left renal stone        Detailed Description of Procedure: The patient was brought to the operating room. He was properly identified. He was administered a general anesthetic and placed in supine position. C arm fluoroscopy was used to localize the stone in the left kidney. A total of 2200 shocks was delivered. We did started a rate of 60 and then did a 3-minute pause after the first 200 shocks. The rate was then increased to 90 and the remaining shocks were delivered to the stone. The stone did appear to fragment nicely and the procedure was terminated. The patient tolerated the procedure well. The plan for the patient is to be discharged home. He will follow-up with me in 4 to 6 months with a KUB x-ray.     Electronically signed by Sudhir Mills MD on 7/6/2023 at 9:05 AM

## 2023-07-06 NOTE — H&P
H&P reviewed from 6/23/23 and patient seen and examined. There are no changes. Plan for OR for L ESWL. [facility], DO, PGY-3  07/06/23  ______________________________________________________________________  History and Physical    Pt Name: Ivonne Trinh  MRN: [id number]  YOB: 1955  Date of evaluation: 7/6/2023    SUBJECTIVE:   History of Chief Complaint:    Patient presents for PAT appointment. He reports renal stones, has had this in the past, history of lithotripsy. He currently complains of BPH and renal stones, has only occasional mild pain. He has been scheduled for left ESWL. Past Medical History    has a past medical history of Asthma, Benign bladder tumor, Bilateral hearing loss, BPH (benign prostatic hyperplasia), Cancer ([address]), Carcinoid tumor of lung, Cataract, GERD (gastroesophageal reflux disease), H/O arthroscopy of left knee, H/O seasonal allergies, Hearing aid worn, Hematuria, Hx of lithotripsy, Hyperlipidemia, Hypertension, IBS (irritable bowel syndrome), Kidney stone, Left ureteral stone, Neoplasm of unspecified nature of bone, soft tissue, and skin, Osteoarthritis, Snores, SOB (shortness of breath), Type II or unspecified type diabetes mellitus without mention of complication, not stated as uncontrolled, Under care of team, Under care of team, Under care of team, Ureteral stent retained, and Wears glasses. Past Surgical History   has a past surgical history that includes bladder tumor excision (2009); Lung surgery (Right, 05/23/2011); pre-malignant / benign skin lesion excision (Left, 06/09/2014); shoulder surgery (Right); shoulder surgery (Left); Cystoscopy; bronchoscopy; Knee arthroscopy; Cystoscopy (Left, 09/21/2015); Cystoscopy (09/30/2015); Ureter stent placement (Left, 09/30/2015); Lithotripsy (09/30/2015); pr lithotripsy xtrcorp shock wave (Left, 01/10/2018); cysto/uretero/pyeloscopy, calculus tx (Right, 06/17/2020);  Colonoscopy (09/08/2015); hernia repair (Left);

## 2023-07-06 NOTE — ANESTHESIA PRE PROCEDURE
Department of Anesthesiology  Preprocedure Note       Name:  Melina Benítez   Age:  76 y.o.  :  1955                                          MRN:  [id number]         Date:  2023      Surgeon: Rita Israel):  Shavonne Griffith MD    Procedure: Procedure(s):  ESWL EXTRACORPOREAL SHOCK WAVE LITHOTRIPSY  (NEX-MED CONF# [id number]    Medications prior to admission:   Prior to Admission medications    Medication Sig Start Date End Date Taking? Authorizing Provider   Omega-3 1000 MG CAPS Take by mouth daily Last dose  pre-op    Historical Provider, MD   JANUMEYVONNE XR  MG TB24 per extended release tablet Take 1 tablet by mouth daily 23   Historical Provider, MD   blood glucose test strips (ASCENSIA AUTODISC VI;ONE TOUCH ULTRA TEST VI) strip 3x per day on insulin 23   Historical Provider, MD   aspirin 81 MG EC tablet Take 1 tablet by mouth daily Per pcp OK to stop for OR 7 days prior to sx. Pt stopped on 2023    Historical Provider, MD   fenofibrate (TRICOR) 145 MG tablet Take 1 tablet by mouth daily    Historical Provider, MD   ibuprofen (ADVIL;MOTRIN) 200 MG tablet Take 1 tablet by mouth every 6 hours as needed for Pain Takes 800 mg a few times / week for prn headache.  Stop 1 week prior to OR    Historical Provider, MD   acetaminophen (TYLENOL) 500 MG tablet Take 1 tablet by mouth every 6 hours as needed for Pain    Historical Provider, MD   lovastatin (MEVACOR) 20 MG tablet TAKE 1 TABLET NIGHTLY  Patient taking differently: TAKE 1 TABLET qAM 23   Aubrey Mueller MD   Insulin Lispro-aabc, 1 U Dial, (LYUMJEV KWIKPEN) 100 UNIT/ML SOPN Inject 7 Units into the skin 3 times daily (before meals)    Historical Provider, MD   vitamin B-12 (CYANOCOBALAMIN) 100 MCG tablet Take 0.5 tablets by mouth daily    Historical Provider, MD LADD UF III MINI PEN NEEDLES 31G X 5 MM MISC INJECT 1 PEN NEEDLE INTO   THE SKIN DAILY 22   Aubrey Mueller MD   losartan (COZAAR) 50 MG tablet TAKE 1 TABLET

## 2023-07-06 NOTE — PROGRESS NOTES
Discharge instructions and prescriptions reviewed with patient and wife. Both acknowledged understanding. Prescriptions e-scripted to patient's pharmacy.

## 2023-07-06 NOTE — DISCHARGE INSTRUCTIONS
ESWL:  You may experience flank pain from the procedure. This should improve over the next couple days. You may experience hematuria (blood in the urine), which should improve over the next couple days. Pt ok to discharge home in good condition  No heavy lifting, >10 lbs for today  Pt should avoid strenuous activity for today  Pt should walk moderately at home  Pt ok to shower   Pt may resume diet as tolerated  Rx in chart  Please call attending physician or hospital  with questions  Call or Present to ED if fever (> 101F), intractable nausea vomiting or pain. Pt should follow up with Dr. Amena Wiseman, in 4 weeks, call to confirm appointment No alcoholic beverages, no driving or operating machinery, no making important decisions for 24 hours. You may have a normal diet but should eat lightly day of surgery. Drink plenty of fluids.   Urinate within 8 hours after surgery, if unable to urinate call your doctor

## 2023-07-18 ENCOUNTER — TELEPHONE (OUTPATIENT)
Dept: UROLOGY | Age: 68
End: 2023-07-18

## 2023-07-18 DIAGNOSIS — N20.0 KIDNEY STONES: Primary | ICD-10-CM

## 2023-07-18 NOTE — TELEPHONE ENCOUNTER
Voicemail was left for patient to call back to schedule follow up appt from procedure. Per  4 mos w/ KUB, Order was placed.

## 2023-08-08 LAB
AVERAGE GLUCOSE: NORMAL
CREATININE, URINE: 50.73
HBA1C MFR BLD: 7.2 %
MICROALBUMIN/CREAT 24H UR: 2.7 MG/G{CREAT}
MICROALBUMIN/CREAT UR-RTO: 53.2

## 2023-09-13 ENCOUNTER — TELEPHONE (OUTPATIENT)
Dept: PRIMARY CARE CLINIC | Age: 68
End: 2023-09-13

## 2023-09-13 NOTE — TELEPHONE ENCOUNTER
Pt has appt with you tomorrow. C/o a runny nose, head hurts, cough, congestion. No fever. Symptoms started on Mon. Asking if you would send in something for hime to [facility] listed?

## 2023-09-13 NOTE — TELEPHONE ENCOUNTER
Medication needs to wait until he is seen, he probably needs testing done ie covid  Many of these sx could be viral cold sx or allergies.

## 2023-09-14 ENCOUNTER — HOSPITAL ENCOUNTER (OUTPATIENT)
Age: 68
Setting detail: SPECIMEN
Discharge: HOME | End: 2023-09-14

## 2023-09-14 ENCOUNTER — OFFICE VISIT (OUTPATIENT)
Dept: PRIMARY CARE CLINIC | Age: 68
End: 2023-09-14
Payer: MEDICARE

## 2023-09-14 VITALS
TEMPERATURE: 98.7 F | BODY MASS INDEX: 30.4 KG/M2 | WEIGHT: 224.4 LBS | HEIGHT: 72 IN | OXYGEN SATURATION: 97 % | SYSTOLIC BLOOD PRESSURE: 124 MMHG | DIASTOLIC BLOOD PRESSURE: 70 MMHG | HEART RATE: 91 BPM

## 2023-09-14 DIAGNOSIS — J06.9 UPPER RESPIRATORY TRACT INFECTION, UNSPECIFIED TYPE: ICD-10-CM

## 2023-09-14 DIAGNOSIS — I10 ESSENTIAL HYPERTENSION: ICD-10-CM

## 2023-09-14 DIAGNOSIS — J06.9 UPPER RESPIRATORY TRACT INFECTION, UNSPECIFIED TYPE: Primary | ICD-10-CM

## 2023-09-14 PROCEDURE — G8417 CALC BMI ABV UP PARAM F/U: HCPCS | Performed by: FAMILY MEDICINE

## 2023-09-14 PROCEDURE — G8427 DOCREV CUR MEDS BY ELIG CLIN: HCPCS | Performed by: FAMILY MEDICINE

## 2023-09-14 PROCEDURE — 99213 OFFICE O/P EST LOW 20 MIN: CPT | Performed by: FAMILY MEDICINE

## 2023-09-14 PROCEDURE — 1123F ACP DISCUSS/DSCN MKR DOCD: CPT | Performed by: FAMILY MEDICINE

## 2023-09-14 PROCEDURE — 1036F TOBACCO NON-USER: CPT | Performed by: FAMILY MEDICINE

## 2023-09-14 PROCEDURE — 3074F SYST BP LT 130 MM HG: CPT | Performed by: FAMILY MEDICINE

## 2023-09-14 PROCEDURE — 3017F COLORECTAL CA SCREEN DOC REV: CPT | Performed by: FAMILY MEDICINE

## 2023-09-14 PROCEDURE — 3078F DIAST BP <80 MM HG: CPT | Performed by: FAMILY MEDICINE

## 2023-09-14 RX ORDER — BENZONATATE 200 MG/1
200 CAPSULE ORAL 3 TIMES DAILY PRN
Qty: 30 CAPSULE | Refills: 0 | Status: SHIPPED | OUTPATIENT
Start: 2023-09-14 | End: 2023-09-24

## 2023-09-14 RX ORDER — AZITHROMYCIN 250 MG/1
250 TABLET, FILM COATED ORAL SEE ADMIN INSTRUCTIONS
Qty: 6 TABLET | Refills: 0 | Status: SHIPPED | OUTPATIENT
Start: 2023-09-14 | End: 2023-09-19

## 2023-09-14 SDOH — ECONOMIC STABILITY: INCOME INSECURITY: HOW HARD IS IT FOR YOU TO PAY FOR THE VERY BASICS LIKE FOOD, HOUSING, MEDICAL CARE, AND HEATING?: NOT HARD AT ALL

## 2023-09-14 SDOH — ECONOMIC STABILITY: HOUSING INSECURITY
IN THE LAST 12 MONTHS, WAS THERE A TIME WHEN YOU DID NOT HAVE A STEADY PLACE TO SLEEP OR SLEPT IN A SHELTER (INCLUDING NOW)?: NO

## 2023-09-14 SDOH — ECONOMIC STABILITY: FOOD INSECURITY: WITHIN THE PAST 12 MONTHS, YOU WORRIED THAT YOUR FOOD WOULD RUN OUT BEFORE YOU GOT MONEY TO BUY MORE.: NEVER TRUE

## 2023-09-14 SDOH — ECONOMIC STABILITY: FOOD INSECURITY: WITHIN THE PAST 12 MONTHS, THE FOOD YOU BOUGHT JUST DIDN'T LAST AND YOU DIDN'T HAVE MONEY TO GET MORE.: NEVER TRUE

## 2023-09-14 ASSESSMENT — PATIENT HEALTH QUESTIONNAIRE - PHQ9
SUM OF ALL RESPONSES TO PHQ QUESTIONS 1-9: 0
SUM OF ALL RESPONSES TO PHQ9 QUESTIONS 1 & 2: 0
1. LITTLE INTEREST OR PLEASURE IN DOING THINGS: 0
SUM OF ALL RESPONSES TO PHQ QUESTIONS 1-9: 0
2. FEELING DOWN, DEPRESSED OR HOPELESS: 0
SUM OF ALL RESPONSES TO PHQ QUESTIONS 1-9: 0
SUM OF ALL RESPONSES TO PHQ QUESTIONS 1-9: 0

## 2023-09-14 NOTE — PROGRESS NOTES
[address] PRIMARY CARE  [address] [facility] [address]  Dept: [address] Susy Santacruz is a 76 y.o. male Established patient, who presents today for his medical conditions/complaintsas noted below. Chief Complaint   Patient presents with    Hypertension     Pt is here for a f/u. Nasal Congestion     Pt is c/o sx started with a sore throat on sundayx5 days ago. Headache    Fatigue    Cough     Pt is c/o productive cough. HPI:     HPI  ST x 5 days  Bad cough, + phlegm greenish yellow  Rib muscles hurting from coughing  Runny nose and sneezing. Some yellow rhinorrhea. Decreased apettite  Drinking fluids  No loss of taste  or smell    Reviewed prior notes None  Reviewed previous Labs    LDL Cholesterol (mg/dL)   Date Value   04/24/2023 04/04/2022 48   06/16/2021            (goal LDL is <100)   AST (U/L)   Date Value   04/24/2023 20     ALT (U/L)   Date Value   04/24/2023 27     BUN (mg/dL)   Date Value   06/23/2023 19     Hemoglobin A1C (%)   Date Value   09/13/2022 8.0 (H)     TSH (uIU/mL)   Date Value   04/24/2023 1.66     BP Readings from Last 3 Encounters:   09/14/23 124/70   07/06/23 128/62   06/23/23 135/86          (goal 120/80)    Past Medical History:   Diagnosis Date    Asthma     Dr. Iker Edge. 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 Carrie Baez PCP    Hypertension     managed by Dr. Carrie Baez PCP.  Does not have a cardiologist    IBS (irritable bowel syndrome)     Kidney stone     multiple times

## 2023-09-15 DIAGNOSIS — U07.1 COVID: Primary | ICD-10-CM

## 2023-09-15 LAB
SARS-COV-2 RNA RESP QL NAA+PROBE: ABNORMAL
SARS-COV-2 RNA RESP QL NAA+PROBE: DETECTED
SOURCE: ABNORMAL

## 2023-10-06 NOTE — PROGRESS NOTES
[facility]  [facility]  [place] [address] [address]  Dept: [address] [facility] Urology Office Note - Established    Patient:  Georgiann Ormond  YOB: 1955  Date: 6/22/2020    The patient is a 72 y.o. male who presents todayfor evaluation of the following problems:   Chief Complaint   Patient presents with    Follow-Up from [address] Flank Pain     right side       HPI  Pt has h/o stones. Had urs last week. Pulled stent out yesterday. Had severe right flank pain. Went to ED. Found to have punctate right ureteral stones. I reviewed his CT with him. Has 2 small, submillimeter stones in distal ureter and punctate calc in kidney. No other stones on right side. Stable stones left side. Summary of old records: N/A    Additional History: N/A    Procedures Today: N/A    Urinalysis today:  No results found for this visit on 06/22/20.   Last several PSA's:  Lab Results   Component Value Date    PSA 0.18 04/05/2018    PSA 0.17 12/28/2017    PSA 0.20 08/07/2015     Last total testosterone:  No results found for: TESTOSTERONE    AUA Symptom Score (6/22/2020):  INCOMPLETE EMPTYING: How often have you had the sensation of not emptying your bladder?: Not at all  FREQUENCY: How often do you have to urinate less than every two hours?: Not at all  INTERMITTENCY: How often have you found you stopped and started again several times when you urinated?: Not at all  URGENCY: How often have you found it difficult to postpone urination?: About Half the time(taking Flomax)  WEAK STREAM: How often have you had a weak urinary stream?: Not at all  STRAINING: How often have you had to strain to start  urination?: Not at all  NOCTURIA: How many times did you typically get up at night to uriniate?: 3 Times  TOTAL I-PSS SCORE[de-identified] 6  How would you feel if you were to spend the rest of your life with your urinary condition?: Pleased    Last BUN and tablet 3    VASCEPA 1 g CAPS capsule Take 2 capsules by mouth daily 180 capsule 3    aspirin 81 MG chewable tablet Take 1 tablet by mouth daily Hold for 5 days after surgery, until 1/16/18 (Patient taking differently: Take 81 mg by mouth daily ) 30 tablet 0    VENTOLIN  (90 BASE) MCG/ACT inhaler inhale 2 puff every 4 hours prn  0    JANUMET XR  MG TB24 tablet Take 2 tablets by mouth daily         Seasonal  Social History     Tobacco Use   Smoking Status Never Smoker   Smokeless Tobacco Never Used     (Ifpatient a smoker, smoking cessation counseling offered)    Social History     Substance and Sexual Activity   Alcohol Use Yes    Comment: once a week       REVIEW OF SYSTEMS:  Review of Systems    Physical Exam:      Vitals:    06/22/20 1556   Temp: 97.9 °F (36.6 °C)     There is no height or weight on file to calculate BMI. Patient is a 72 y.o. male in no acute distress and alert and oriented to person, place and time. Physical Exam  Constitutional: Patient in no acute distress. Neuro: Alert and oriented to person, place and time. Psych: Mood normal, affect normal  Skin: No rash noted  HEENT: Head: Normocephalic andatraumatic  Conjunctivae and EOM are normal. Pupils are equal, round  Nose:Normal  Right External Ear: Normal; Left External Ear: Normal  Mouth: Mucosa Moist      Assessment and Plan      1. Ureteral stone    2. Kidney stones    3. Right flank pain           Plan:   F/u 6 weeks with renal u/s      Return in about 6 weeks (around 8/3/2020). Prescriptions Ordered:  No orders of the defined types were placed in this encounter. Orders Placed:  Orders Placed This Encounter   Procedures    US Renal Kidney     Standing Status:   Future     Standing Expiration Date:   6/17/2021     Order Specific Question:   Reason for exam:     Answer:   right hydro h/o stones           Raymundo Tavera MD    Agree with the ROS entered by the MA. No

## 2023-10-11 DIAGNOSIS — Z79.4 TYPE 2 DIABETES MELLITUS WITHOUT COMPLICATION, WITH LONG-TERM CURRENT USE OF INSULIN (HCC): ICD-10-CM

## 2023-10-11 DIAGNOSIS — E11.9 TYPE 2 DIABETES MELLITUS WITHOUT COMPLICATION, WITH LONG-TERM CURRENT USE OF INSULIN (HCC): ICD-10-CM

## 2023-10-11 RX ORDER — FLURBIPROFEN SODIUM 0.3 MG/ML
SOLUTION/ DROPS OPHTHALMIC
Qty: 90 EACH | Refills: 3 | OUTPATIENT
Start: 2023-10-11

## 2023-10-11 NOTE — TELEPHONE ENCOUNTER
LAST VISIT:   9/14/2023     Future Appointments   Date Time Provider [address]   11/6/2023 10:00 AM César Tenorio MD [address] pharmacy Mail order service called for patient stating that the patient is in need of a 30 day supply of pen needles to be sent to [facility] listed below. Pt has been reusing his needles. [facility] will be faxing over an order for more pen needles sometime today. [facility] in [place] on [address]. Please advise.

## 2023-10-12 ENCOUNTER — TELEPHONE (OUTPATIENT)
Dept: PRIMARY CARE CLINIC | Age: 68
End: 2023-10-12

## 2023-10-12 NOTE — TELEPHONE ENCOUNTER
Received letter from pharmacy [facility], stating pt is asking to change rx for his needles. They are asking for BD MINI NDL. Would like some clarification on this request.     Lvm to call office back.

## 2023-10-13 NOTE — TELEPHONE ENCOUNTER
Lvm to call office back. Would like to know if pt is switching to [address]. And what needles are needed refilled?

## 2023-10-13 NOTE — TELEPHONE ENCOUNTER
Pt said that he did not want a change. Uses the BD pen Ultra fine needles and needs short supply to [facility] and 90 day to [facility].  BRYON

## 2023-10-16 DIAGNOSIS — Z79.4 TYPE 2 DIABETES MELLITUS WITHOUT COMPLICATION, WITH LONG-TERM CURRENT USE OF INSULIN (HCC): ICD-10-CM

## 2023-10-16 DIAGNOSIS — E11.9 TYPE 2 DIABETES MELLITUS WITHOUT COMPLICATION, WITH LONG-TERM CURRENT USE OF INSULIN (HCC): ICD-10-CM

## 2023-10-16 RX ORDER — FLURBIPROFEN SODIUM 0.3 MG/ML
SOLUTION/ DROPS OPHTHALMIC
Qty: 30 EACH | Refills: 0 | Status: SHIPPED | OUTPATIENT
Start: 2023-10-16 | End: 2023-10-16 | Stop reason: SDUPTHER

## 2023-10-16 RX ORDER — FLURBIPROFEN SODIUM 0.3 MG/ML
SOLUTION/ DROPS OPHTHALMIC
Qty: 90 EACH | Refills: 3 | Status: SHIPPED | OUTPATIENT
Start: 2023-10-16

## 2023-10-16 NOTE — TELEPHONE ENCOUNTER
Pt said that he did not want a change. Uses the BD pen Ultra fine needles and needs short supply to [facility] and 90 day to [employer] [facility].  BRYON

## 2023-10-18 DIAGNOSIS — E11.9 TYPE 2 DIABETES MELLITUS WITHOUT COMPLICATION, WITH LONG-TERM CURRENT USE OF INSULIN (HCC): ICD-10-CM

## 2023-10-18 DIAGNOSIS — Z79.4 TYPE 2 DIABETES MELLITUS WITHOUT COMPLICATION, WITH LONG-TERM CURRENT USE OF INSULIN (HCC): ICD-10-CM

## 2023-10-18 RX ORDER — FLURBIPROFEN SODIUM 0.3 MG/ML
SOLUTION/ DROPS OPHTHALMIC
Qty: 30 EACH | Refills: 0 | OUTPATIENT
Start: 2023-10-18

## 2023-10-18 NOTE — TELEPHONE ENCOUNTER
Pt would like a short term supply sent to [facility] and 90 day sent to [facility].      LAST VISIT:   9/14/2023     Future Appointments   Date Time Provider [facility] [address]   10/25/2023 10:30 AM SCHEDULE, [facility]   11/6/2023 10:00 AM Shavonne Griffith MD [address] [facility]

## 2023-10-20 NOTE — TELEPHONE ENCOUNTER
Return call received, talked with patient who expressed understanding and will contact his endo provider.

## 2023-10-25 ENCOUNTER — NURSE ONLY (OUTPATIENT)
Dept: PRIMARY CARE CLINIC | Age: 68
End: 2023-10-25
Payer: MEDICARE

## 2023-10-25 VITALS — SYSTOLIC BLOOD PRESSURE: 128 MMHG | OXYGEN SATURATION: 97 % | DIASTOLIC BLOOD PRESSURE: 66 MMHG | HEART RATE: 83 BPM

## 2023-10-25 DIAGNOSIS — Z23 IMMUNIZATION DUE: Primary | ICD-10-CM

## 2023-10-25 PROCEDURE — 90694 VACC AIIV4 NO PRSRV 0.5ML IM: CPT | Performed by: FAMILY MEDICINE

## 2023-10-25 PROCEDURE — G0008 ADMIN INFLUENZA VIRUS VAC: HCPCS | Performed by: FAMILY MEDICINE

## 2023-10-25 NOTE — PROGRESS NOTES
After obtaining consent, and per orders of Dr. Albert Restrepo, injection of HD Flu given in Left deltoid by Nithin Cameron MA. Patient instructed to remain in clinic for 20 minutes afterwards, and to report any adverse reaction to me immediately.

## 2023-11-02 ENCOUNTER — HOSPITAL ENCOUNTER (OUTPATIENT)
Age: 68
Discharge: HOME | End: 2023-11-04
Payer: MEDICARE

## 2023-11-02 ENCOUNTER — HOSPITAL ENCOUNTER (OUTPATIENT)
Dept: GENERAL RADIOLOGY | Age: 68
Discharge: HOME | End: 2023-11-04
Payer: MEDICARE

## 2023-11-02 DIAGNOSIS — N20.0 KIDNEY STONES: ICD-10-CM

## 2023-11-02 PROCEDURE — 74018 RADEX ABDOMEN 1 VIEW: CPT

## 2023-11-06 ENCOUNTER — OFFICE VISIT (OUTPATIENT)
Dept: UROLOGY | Age: 68
End: 2023-11-06
Payer: MEDICARE

## 2023-11-06 VITALS
HEART RATE: 79 BPM | WEIGHT: 227 LBS | HEIGHT: 72 IN | DIASTOLIC BLOOD PRESSURE: 85 MMHG | BODY MASS INDEX: 30.75 KG/M2 | TEMPERATURE: 97.3 F | OXYGEN SATURATION: 97 % | SYSTOLIC BLOOD PRESSURE: 138 MMHG

## 2023-11-06 DIAGNOSIS — N40.1 BPH WITH OBSTRUCTION/LOWER URINARY TRACT SYMPTOMS: ICD-10-CM

## 2023-11-06 DIAGNOSIS — R39.15 URGENCY OF URINATION: ICD-10-CM

## 2023-11-06 DIAGNOSIS — R35.0 URINARY FREQUENCY: ICD-10-CM

## 2023-11-06 DIAGNOSIS — N13.8 BPH WITH OBSTRUCTION/LOWER URINARY TRACT SYMPTOMS: ICD-10-CM

## 2023-11-06 DIAGNOSIS — N20.0 KIDNEY STONES: Primary | ICD-10-CM

## 2023-11-06 PROCEDURE — G8427 DOCREV CUR MEDS BY ELIG CLIN: HCPCS | Performed by: UROLOGY

## 2023-11-06 PROCEDURE — 3075F SYST BP GE 130 - 139MM HG: CPT | Performed by: UROLOGY

## 2023-11-06 PROCEDURE — G8417 CALC BMI ABV UP PARAM F/U: HCPCS | Performed by: UROLOGY

## 2023-11-06 PROCEDURE — 3017F COLORECTAL CA SCREEN DOC REV: CPT | Performed by: UROLOGY

## 2023-11-06 PROCEDURE — 99214 OFFICE O/P EST MOD 30 MIN: CPT | Performed by: UROLOGY

## 2023-11-06 PROCEDURE — 1123F ACP DISCUSS/DSCN MKR DOCD: CPT | Performed by: UROLOGY

## 2023-11-06 PROCEDURE — G8484 FLU IMMUNIZE NO ADMIN: HCPCS | Performed by: UROLOGY

## 2023-11-06 PROCEDURE — 3079F DIAST BP 80-89 MM HG: CPT | Performed by: UROLOGY

## 2023-11-06 PROCEDURE — 1036F TOBACCO NON-USER: CPT | Performed by: UROLOGY

## 2023-11-06 RX ORDER — TAMSULOSIN HYDROCHLORIDE 0.4 MG/1
0.4 CAPSULE ORAL DAILY
Qty: 30 CAPSULE | Refills: 5 | Status: SHIPPED | OUTPATIENT
Start: 2023-11-06

## 2023-11-06 ASSESSMENT — ENCOUNTER SYMPTOMS
WHEEZING: 0
ABDOMINAL PAIN: 0
BACK PAIN: 0
EYE PAIN: 0
COUGH: 0
DIARRHEA: 0
NAUSEA: 0
VOMITING: 0
CONSTIPATION: 0
SHORTNESS OF BREATH: 0
EYE REDNESS: 0

## 2023-11-06 NOTE — PROGRESS NOTES
[address]  [address] [facility] [address]  Dept: [address] [place] [address] Urology Office Note - Established    Patient:  Sandee Aguilar  YOB: 1955  Date: 11/6/2023    The patient is a 76 y.o. male who presents todayfor evaluation of the following problems:   Chief Complaint   Patient presents with    Other     4 month KUB       HPI  This is a 69-year-old gentleman who has a history of stones and BPH. He underwent ESWL over the summer. His KUB x-ray shows no residual stones. He does have worsening urgency and frequency. He has a good flow. He thinks that when he took Flomax for a brief period of time in the past, it may have helped his lower urinary tract symptoms. Summary of old records: N/A    Additional History: N/A    Procedures Today: N/A    Urinalysis today:  No results found for this visit on 11/06/23. Last several PSA's:  Lab Results   Component Value Date    PSA 0.21 05/08/2023    PSA 0.18 04/05/2018    PSA 0.17 12/28/2017     Last total testosterone:  No results found for: \"TESTOSTERONE\"    AUA Symptom Score (11/6/2023):                                Last BUN and creatinine:  Lab Results   Component Value Date    BUN 19 06/23/2023     Lab Results   Component Value Date    CREATININE 1.03 06/23/2023       Additional Lab/Culture results: none    Imaging Reviewed during this Office Visit: none  (results were independently reviewed by physician and radiology report verified)    PAST MEDICAL, FAMILY AND SOCIAL HISTORY UPDATE:  Past Medical History:   Diagnosis Date    Asthma     Dr. Florentino Cruz. 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

## 2023-12-12 RX ORDER — FENOFIBRATE 145 MG/1
145 TABLET, COATED ORAL DAILY
Qty: 90 TABLET | Refills: 3 | OUTPATIENT
Start: 2023-12-12

## 2023-12-12 RX ORDER — FENOFIBRATE 145 MG/1
145 TABLET, COATED ORAL DAILY
Qty: 14 TABLET | Refills: 0 | Status: SHIPPED | OUTPATIENT
Start: 2023-12-12

## 2023-12-12 NOTE — TELEPHONE ENCOUNTER
AYLIN Arrington    Requesting short term fill to local pharmacy due to waiting on mail order to come.

## 2023-12-19 RX ORDER — FENOFIBRATE 145 MG/1
145 TABLET, COATED ORAL DAILY
Qty: 14 TABLET | Refills: 0 | OUTPATIENT
Start: 2023-12-19

## 2023-12-28 ENCOUNTER — OFFICE VISIT (OUTPATIENT)
Dept: PRIMARY CARE CLINIC | Age: 68
End: 2023-12-28
Payer: MEDICARE

## 2023-12-28 VITALS
BODY MASS INDEX: 30.75 KG/M2 | HEART RATE: 75 BPM | DIASTOLIC BLOOD PRESSURE: 76 MMHG | WEIGHT: 227 LBS | SYSTOLIC BLOOD PRESSURE: 134 MMHG | HEIGHT: 72 IN | OXYGEN SATURATION: 98 %

## 2023-12-28 DIAGNOSIS — T88.7XXA MEDICATION SIDE EFFECT: ICD-10-CM

## 2023-12-28 DIAGNOSIS — I10 ESSENTIAL HYPERTENSION: Primary | ICD-10-CM

## 2023-12-28 DIAGNOSIS — L43.9 LICHEN PLANUS: ICD-10-CM

## 2023-12-28 PROCEDURE — 1036F TOBACCO NON-USER: CPT | Performed by: FAMILY MEDICINE

## 2023-12-28 PROCEDURE — 99214 OFFICE O/P EST MOD 30 MIN: CPT | Performed by: FAMILY MEDICINE

## 2023-12-28 PROCEDURE — 3075F SYST BP GE 130 - 139MM HG: CPT | Performed by: FAMILY MEDICINE

## 2023-12-28 PROCEDURE — G8417 CALC BMI ABV UP PARAM F/U: HCPCS | Performed by: FAMILY MEDICINE

## 2023-12-28 PROCEDURE — G8484 FLU IMMUNIZE NO ADMIN: HCPCS | Performed by: FAMILY MEDICINE

## 2023-12-28 PROCEDURE — 3078F DIAST BP <80 MM HG: CPT | Performed by: FAMILY MEDICINE

## 2023-12-28 PROCEDURE — G8427 DOCREV CUR MEDS BY ELIG CLIN: HCPCS | Performed by: FAMILY MEDICINE

## 2023-12-28 PROCEDURE — 3017F COLORECTAL CA SCREEN DOC REV: CPT | Performed by: FAMILY MEDICINE

## 2023-12-28 PROCEDURE — 1123F ACP DISCUSS/DSCN MKR DOCD: CPT | Performed by: FAMILY MEDICINE

## 2023-12-28 RX ORDER — CLOBETASOL PROPIONATE 0.5 MG/G
OINTMENT TOPICAL
Qty: 15 G | Refills: 2 | Status: SHIPPED | OUTPATIENT
Start: 2023-12-28 | End: 2024-01-02

## 2023-12-28 RX ORDER — FENOFIBRATE 145 MG/1
145 TABLET, COATED ORAL EVERY OTHER DAY
Qty: 14 TABLET | Refills: 0 | Status: SHIPPED | OUTPATIENT
Start: 2023-12-28 | End: 2024-03-27

## 2023-12-28 NOTE — PROGRESS NOTES
[address] PRIMARY CARE  [address] [address]  [facility] [address]  Dept: [address] Susy Santacruz is a 76 y.o. male Established patient, who presents today for his medical conditions/complaintsas noted belEstablished patientow. Chief Complaint   Patient presents with    Hypertension     Htn check up        HPI:     HPI  Right ankle soreness x   Takes B12 supplements   Last A1C was 7.3  at endocrine  Reviewed prior notes None  Reviewed previous Labs    LDL Cholesterol (mg/dL)   Date Value   04/24/2023 04/04/2022 48   06/16/2021            (goal LDL is <100)   AST (U/L)   Date Value   04/24/2023 20     ALT (U/L)   Date Value   04/24/2023 27     BUN (mg/dL)   Date Value   06/23/2023 19     Hemoglobin A1C (%)   Date Value   08/08/2023 7.2     TSH (uIU/mL)   Date Value   04/24/2023 1.66     BP Readings from Last 3 Encounters:   12/28/23 134/76   11/06/23 138/85   10/25/23 128/66          (goal 120/80)    Past Medical History:   Diagnosis Date    Asthma     Dr. Nadira Ladd. 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 Tonya Frederick PCP    Hypertension     managed by Dr. Tonya Frederick PCP.  Does not have a cardiologist    IBS (irritable bowel syndrome)     Kidney stone     multiple times   Dr. Jeff Valle    Left ureteral stone     Neoplasm of unspecified nature of bone, soft tissue, and skin 05/20/2014    lesion of left side of nose    Osteoarthritis     hands    Snores     SOB (shortness of breath)     with exertion    Type II or unspecified type diabetes

## 2023-12-29 ENCOUNTER — HOSPITAL ENCOUNTER (OUTPATIENT)
Age: 68
Setting detail: SPECIMEN
Discharge: HOME | End: 2023-12-29

## 2023-12-29 ENCOUNTER — TELEPHONE (OUTPATIENT)
Dept: UROLOGY | Age: 68
End: 2023-12-29

## 2023-12-29 DIAGNOSIS — R31.0 GROSS HEMATURIA: ICD-10-CM

## 2023-12-29 DIAGNOSIS — R35.0 URINARY FREQUENCY: Primary | ICD-10-CM

## 2023-12-29 DIAGNOSIS — R35.0 URINARY FREQUENCY: ICD-10-CM

## 2023-12-29 NOTE — TELEPHONE ENCOUNTER
Patient came into office stating he is having blood in urine since Christmas Eve. NO pain, but irritation.

## 2023-12-30 LAB
MICROORGANISM SPEC CULT: NORMAL
SPECIMEN DESCRIPTION: NORMAL

## 2024-01-08 ENCOUNTER — OFFICE VISIT (OUTPATIENT)
Dept: UROLOGY | Age: 69
End: 2024-01-08
Payer: MEDICARE

## 2024-01-08 VITALS
SYSTOLIC BLOOD PRESSURE: 130 MMHG | TEMPERATURE: 49.6 F | BODY MASS INDEX: 31.69 KG/M2 | OXYGEN SATURATION: 98 % | WEIGHT: 234 LBS | HEIGHT: 72 IN | HEART RATE: 84 BPM | DIASTOLIC BLOOD PRESSURE: 72 MMHG

## 2024-01-08 DIAGNOSIS — N40.1 BPH WITH OBSTRUCTION/LOWER URINARY TRACT SYMPTOMS: ICD-10-CM

## 2024-01-08 DIAGNOSIS — N13.8 BPH WITH OBSTRUCTION/LOWER URINARY TRACT SYMPTOMS: ICD-10-CM

## 2024-01-08 DIAGNOSIS — R31.0 GROSS HEMATURIA: ICD-10-CM

## 2024-01-08 DIAGNOSIS — R35.0 URINARY FREQUENCY: Primary | ICD-10-CM

## 2024-01-08 LAB — POST VOID RESIDUAL (PVR): 19 ML

## 2024-01-08 PROCEDURE — G8427 DOCREV CUR MEDS BY ELIG CLIN: HCPCS | Performed by: UROLOGY

## 2024-01-08 PROCEDURE — G8417 CALC BMI ABV UP PARAM F/U: HCPCS | Performed by: UROLOGY

## 2024-01-08 PROCEDURE — G8484 FLU IMMUNIZE NO ADMIN: HCPCS | Performed by: UROLOGY

## 2024-01-08 PROCEDURE — 3017F COLORECTAL CA SCREEN DOC REV: CPT | Performed by: UROLOGY

## 2024-01-08 PROCEDURE — 3078F DIAST BP <80 MM HG: CPT | Performed by: UROLOGY

## 2024-01-08 PROCEDURE — 51798 US URINE CAPACITY MEASURE: CPT | Performed by: UROLOGY

## 2024-01-08 PROCEDURE — 1036F TOBACCO NON-USER: CPT | Performed by: UROLOGY

## 2024-01-08 PROCEDURE — 99214 OFFICE O/P EST MOD 30 MIN: CPT | Performed by: UROLOGY

## 2024-01-08 PROCEDURE — 3075F SYST BP GE 130 - 139MM HG: CPT | Performed by: UROLOGY

## 2024-01-08 PROCEDURE — 1123F ACP DISCUSS/DSCN MKR DOCD: CPT | Performed by: UROLOGY

## 2024-01-08 ASSESSMENT — ENCOUNTER SYMPTOMS
COUGH: 0
EYE REDNESS: 0
ABDOMINAL PAIN: 0
COLOR CHANGE: 0
VOMITING: 0
NAUSEA: 0
EYE PAIN: 0
SHORTNESS OF BREATH: 0
BACK PAIN: 0
WHEEZING: 0

## 2024-01-08 NOTE — PROGRESS NOTES
[facility]ANS [facility]  [address]  [facility] [address]  Dept: [phone]    [facility] Urology Office Note - Established    Patient:  Jose Guadalupe Sharpe  YOB: 1955  Date: 1/8/2024    The patient is a 68 y.o. male who presents todayfor evaluation of the following problems:   Chief Complaint   Patient presents with    Hematuria       HPI  This is a very pleasant 68-year-old gentleman who has a history of stones BPH.  He did have a recent episode of painless gross hematuria.  This happened on a couple of occasions but his urine is now cleared up.  He has had multiple stone interventions in the past.  Additionally, we had started him on Flomax a few months ago.  He has not really noticed any meaningful improvement in his urinary symptoms.    Summary of old records: N/A    Additional History: N/A    Procedures Today: N/A    Urinalysis today:  No results found for this visit on 01/08/24.  Last several PSA's:  Lab Results   Component Value Date    PSA 0.21 05/08/2023    PSA 0.18 04/05/2018    PSA 0.17 12/28/2017     Last total testosterone:  No results found for: \"TESTOSTERONE\"    AUA Symptom Score (1/8/2024):  INCOMPLETE EMPTYING: How often have you had the sensation of not emptying your bladder?: Not at all  FREQUENCY: How often do you have to urinate less than every two hours?: Almost always  INTERMITTENCY: How often have you found you stopped and started again several times when you urinated?: Not at all  URGENCY: How often have you found it difficult to postpone urination?: Less than Half the time  WEAK STREAM: How often have you had a weak urinary stream?: Less than Half the time  STRAINING: How often have you had to strain to start  urination?: Not at all  NOCTURIA: How many times did you typically get up at night to uriniate?: 1 Time  TOTAL I-PSS SCORE:: 10       Last BUN and creatinine:  Lab Results   Component Value Date

## 2024-01-08 NOTE — PROGRESS NOTES
Review of Systems   Constitutional:  Negative for appetite change, chills and fever.   Eyes:  Negative for pain, redness and visual disturbance.   Respiratory:  Negative for cough, shortness of breath and wheezing.    Cardiovascular:  Negative for chest pain and leg swelling.   Gastrointestinal:  Negative for abdominal pain, nausea and vomiting.   Genitourinary:  Negative for difficulty urinating, dysuria, flank pain, frequency, hematuria, testicular pain and urgency.   Musculoskeletal:  Negative for back pain, joint swelling and myalgias.   Skin:  Negative for color change, rash and wound.   Neurological:  Negative for dizziness, tremors, weakness, numbness and headaches.   Hematological:  Negative for adenopathy. Does not bruise/bleed easily.

## 2024-01-17 ENCOUNTER — HOSPITAL ENCOUNTER (OUTPATIENT)
Age: 69
Discharge: HOME | End: 2024-01-17
Attending: UROLOGY
Payer: MEDICARE

## 2024-01-17 ENCOUNTER — HOSPITAL ENCOUNTER (OUTPATIENT)
Dept: CT IMAGING | Age: 69
Discharge: HOME | End: 2024-01-19
Attending: UROLOGY
Payer: MEDICARE

## 2024-01-17 DIAGNOSIS — R31.0 GROSS HEMATURIA: ICD-10-CM

## 2024-01-17 DIAGNOSIS — T88.7XXA MEDICATION SIDE EFFECT: ICD-10-CM

## 2024-01-17 LAB
EGFR, POC: >60 ML/MIN/1.73M2
POC CREATININE: 0.9 MG/DL (ref 0.51–1.19)
VIT B12 SERPL-MCNC: 862 PG/ML (ref 232–1245)

## 2024-01-17 PROCEDURE — 6360000004 HC RX CONTRAST MEDICATION: Performed by: UROLOGY

## 2024-01-17 PROCEDURE — 36415 COLL VENOUS BLD VENIPUNCTURE: CPT

## 2024-01-17 PROCEDURE — 82607 VITAMIN B-12: CPT

## 2024-01-17 PROCEDURE — 2580000003 HC RX 258: Performed by: UROLOGY

## 2024-01-17 PROCEDURE — 74178 CT ABD&PLV WO CNTR FLWD CNTR: CPT | Performed by: UROLOGY

## 2024-01-17 PROCEDURE — 82565 ASSAY OF CREATININE: CPT

## 2024-01-17 RX ORDER — SODIUM CHLORIDE 0.9 % (FLUSH) 0.9 %
10 SYRINGE (ML) INJECTION PRN
Status: DISCONTINUED | OUTPATIENT
Start: 2024-01-17 | End: 2024-01-20 | Stop reason: HOSPADM

## 2024-01-17 RX ORDER — 0.9 % SODIUM CHLORIDE 0.9 %
100 INTRAVENOUS SOLUTION INTRAVENOUS ONCE
Status: COMPLETED | OUTPATIENT
Start: 2024-01-17 | End: 2024-01-17

## 2024-01-17 RX ADMIN — SODIUM CHLORIDE 100 ML: 9 INJECTION, SOLUTION INTRAVENOUS at 09:36

## 2024-01-17 RX ADMIN — SODIUM CHLORIDE, PRESERVATIVE FREE 10 ML: 5 INJECTION INTRAVENOUS at 09:36

## 2024-01-17 RX ADMIN — IOPAMIDOL 120 ML: 755 INJECTION, SOLUTION INTRAVENOUS at 09:35

## 2024-01-23 RX ORDER — FENOFIBRATE 145 MG/1
145 TABLET, COATED ORAL EVERY OTHER DAY
Qty: 45 TABLET | Refills: 3 | Status: SHIPPED | OUTPATIENT
Start: 2024-01-23 | End: 2025-01-17

## 2024-01-23 RX ORDER — LOVASTATIN 20 MG/1
20 TABLET ORAL EVERY MORNING
Qty: 90 TABLET | Refills: 2 | Status: SHIPPED | OUTPATIENT
Start: 2024-01-23 | End: 2024-10-19

## 2024-01-29 ENCOUNTER — PROCEDURE VISIT (OUTPATIENT)
Dept: UROLOGY | Age: 69
End: 2024-01-29
Payer: MEDICARE

## 2024-01-29 ENCOUNTER — TELEPHONE (OUTPATIENT)
Dept: PRIMARY CARE CLINIC | Age: 69
End: 2024-01-29

## 2024-01-29 VITALS — DIASTOLIC BLOOD PRESSURE: 72 MMHG | HEART RATE: 99 BPM | SYSTOLIC BLOOD PRESSURE: 129 MMHG | TEMPERATURE: 97.6 F

## 2024-01-29 DIAGNOSIS — R31.0 GROSS HEMATURIA: Primary | ICD-10-CM

## 2024-01-29 DIAGNOSIS — R91.8 LUNG NODULE, MULTIPLE: Primary | ICD-10-CM

## 2024-01-29 PROCEDURE — 52000 CYSTOURETHROSCOPY: CPT | Performed by: UROLOGY

## 2024-01-29 RX ORDER — FINASTERIDE 5 MG/1
5 TABLET, FILM COATED ORAL DAILY
Qty: 90 TABLET | Refills: 3 | Status: SHIPPED | OUTPATIENT
Start: 2024-01-29

## 2024-01-29 NOTE — PROGRESS NOTES
Cystoscopy Operative Note (1/29/24)  Surgeon: Cy Clemens MD  Anesthesia: Urethral 2% Xylocaine   Indications: Hematuria   Position: Dorsal Lithotomy    Findings:   Risks and Benefits discussed with patient prior to procedure.  The patient was prepped and draped in the usual sterile fashion.  The flexible cystoscope was advanced through the urethra and into the bladder.  The bladder was thoroughly inspected and the following was noted:    Residual Urine: none  Urethra: normal appearing urethra with no masses, tenderness or lesions  Prostate: Hypervascular; obstructing lateral lobes of prostate; median lobe present? no.   Bladder: No tumors or CIS noted.  No bladder diverticulum.  There was mild trabeculation noted.  Ureters: Clear efflux from both ureters.  Orifices with normal configuration and location.    The cystoscope was removed.  The patient tolerated the procedure well.     Agree with the ROS entered by the MA.    Patient has hypervascular prostate.  Will start finasteride.  Continue tamsulosin.  Follow-up 6 months.  Patient likely had intermittent hematuria from BPH.

## 2024-01-30 NOTE — TELEPHONE ENCOUNTER
Call patient  It's been over a year since his last CT lung screening, I will put the order in.   He does have a history of lung nodules. This CT urogram does show some enlargement of one of them  Please also double check his smoking history, currently states he never smoked.

## 2024-02-01 ENCOUNTER — OFFICE VISIT (OUTPATIENT)
Dept: PRIMARY CARE CLINIC | Age: 69
End: 2024-02-01

## 2024-02-01 VITALS
SYSTOLIC BLOOD PRESSURE: 130 MMHG | OXYGEN SATURATION: 96 % | BODY MASS INDEX: 30.96 KG/M2 | DIASTOLIC BLOOD PRESSURE: 70 MMHG | WEIGHT: 228.6 LBS | HEIGHT: 72 IN | HEART RATE: 84 BPM

## 2024-02-01 DIAGNOSIS — E11.9 TYPE 2 DIABETES MELLITUS WITHOUT COMPLICATION, WITH LONG-TERM CURRENT USE OF INSULIN (HCC): ICD-10-CM

## 2024-02-01 DIAGNOSIS — Z79.4 TYPE 2 DIABETES MELLITUS WITHOUT COMPLICATION, WITH LONG-TERM CURRENT USE OF INSULIN (HCC): ICD-10-CM

## 2024-02-01 DIAGNOSIS — R91.8 LUNG NODULE, MULTIPLE: Primary | ICD-10-CM

## 2024-02-01 DIAGNOSIS — Z86.012 HISTORY OF BENIGN CARCINOID TUMOR: Chronic | ICD-10-CM

## 2024-02-01 DIAGNOSIS — I10 ESSENTIAL HYPERTENSION: ICD-10-CM

## 2024-02-01 ASSESSMENT — PATIENT HEALTH QUESTIONNAIRE - PHQ9
1. LITTLE INTEREST OR PLEASURE IN DOING THINGS: 0
SUM OF ALL RESPONSES TO PHQ QUESTIONS 1-9: 0
SUM OF ALL RESPONSES TO PHQ QUESTIONS 1-9: 0
SUM OF ALL RESPONSES TO PHQ9 QUESTIONS 1 & 2: 0
2. FEELING DOWN, DEPRESSED OR HOPELESS: 0
SUM OF ALL RESPONSES TO PHQ QUESTIONS 1-9: 0
SUM OF ALL RESPONSES TO PHQ QUESTIONS 1-9: 0

## 2024-02-01 NOTE — PROGRESS NOTES
[facility]  [facility] PRIMARY CARE  [address]  [place] [address]  Dept: [phone]    Jose Guadalupe Sharpe is a 68 y.o. male Established patient, who presents today for his medical conditions/complaintsas noted below.      Chief Complaint   Patient presents with    lung nodules     Pt states there was nodules showing on CT.        HPI:     HPI    Reviewed prior notes  endocrinology  urology  Reviewed previous Labs and Imaging    Had carcinoid tumor removed about 14 years ago from right lung    Spouse present.   LDL Cholesterol (mg/dL)   Date Value   04/24/2023 04/04/2022 48   06/16/2021            (goal LDL is <100)   AST (U/L)   Date Value   04/24/2023 20     ALT (U/L)   Date Value   04/24/2023 27     BUN (mg/dL)   Date Value   06/23/2023 19     Hemoglobin A1C (%)   Date Value   08/08/2023 7.2     TSH (uIU/mL)   Date Value   04/24/2023 1.66     BP Readings from Last 3 Encounters:   02/01/24 130/70   01/29/24 129/72   01/08/24 130/72          (goal 120/80)    Past Medical History:   Diagnosis Date    Asthma     Dr. Nagy Pulmonologist [facility]. 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 Murray PCP    Hypertension     managed by Dr. Murray PCP. Does not have a cardiologist    IBS (irritable bowel syndrome)     Kidney stone     multiple times   Dr. Clemens    Left ureteral stone     Neoplasm of unspecified nature of bone, soft tissue, and skin 05/20/2014    lesion of left side of nose    Osteoarthritis     hands    Snores     SOB (shortness of breath)     with

## 2024-02-06 NOTE — TELEPHONE ENCOUNTER
Patient states he has the lung CT scheduled for 02/19, he also has an appointment to see a pulmonologist on 02/26. Patient states he has never been a smoker.

## 2024-02-08 DIAGNOSIS — N13.8 BPH WITH OBSTRUCTION/LOWER URINARY TRACT SYMPTOMS: ICD-10-CM

## 2024-02-08 DIAGNOSIS — N40.1 BPH WITH OBSTRUCTION/LOWER URINARY TRACT SYMPTOMS: ICD-10-CM

## 2024-02-08 NOTE — TELEPHONE ENCOUNTER
Last seen in office 1/8/24  Cysto 1/29/24  Plan:   Ctu and cysto for gross hematuria  Will consider adding finasteride vs pvp if rest of w/u wnl      Return for Next available appointment, Cystoscopy and CTU.    Needs 90 day supply Flomax to [facility]

## 2024-02-09 RX ORDER — TAMSULOSIN HYDROCHLORIDE 0.4 MG/1
0.4 CAPSULE ORAL DAILY
Qty: 90 CAPSULE | Refills: 3 | Status: SHIPPED | OUTPATIENT
Start: 2024-02-09

## 2024-02-19 ENCOUNTER — HOSPITAL ENCOUNTER (OUTPATIENT)
Dept: CT IMAGING | Age: 69
Discharge: HOME | End: 2024-02-21
Payer: MEDICARE

## 2024-02-19 VITALS — WEIGHT: 230 LBS | HEIGHT: 72 IN | BODY MASS INDEX: 31.15 KG/M2

## 2024-02-19 DIAGNOSIS — R91.8 LUNG NODULE, MULTIPLE: ICD-10-CM

## 2024-02-19 PROCEDURE — 71250 CT THORAX DX C-: CPT

## 2024-02-20 NOTE — RESULT ENCOUNTER NOTE
Left lung nodules appear stable since 2022.  Shows calcification in the heart arteries. This is treated by keeping diabetes and cholesterol under excellent control  Still see the lung specialist

## 2024-03-01 ENCOUNTER — TRANSCRIBE ORDERS (OUTPATIENT)
Dept: ADMINISTRATIVE | Age: 69
End: 2024-03-01

## 2024-03-01 DIAGNOSIS — R91.1 LUNG NODULE: Primary | ICD-10-CM

## 2024-03-13 ENCOUNTER — OFFICE VISIT (OUTPATIENT)
Dept: PRIMARY CARE CLINIC | Age: 69
End: 2024-03-13
Payer: MEDICARE

## 2024-03-13 VITALS
HEART RATE: 102 BPM | HEIGHT: 72 IN | WEIGHT: 229 LBS | TEMPERATURE: 98.6 F | SYSTOLIC BLOOD PRESSURE: 138 MMHG | DIASTOLIC BLOOD PRESSURE: 54 MMHG | BODY MASS INDEX: 31.02 KG/M2 | OXYGEN SATURATION: 97 %

## 2024-03-13 DIAGNOSIS — R09.89 UPPER RESPIRATORY SYMPTOM: Primary | ICD-10-CM

## 2024-03-13 LAB
INFLUENZA A ANTIGEN, POC: NEGATIVE
INFLUENZA B ANTIGEN, POC: NEGATIVE
Lab: NORMAL
QC PASS/FAIL: NORMAL
SARS-COV-2 RDRP RESP QL NAA+PROBE: NEGATIVE
VALID INTERNAL CONTROL, POC: NORMAL

## 2024-03-13 PROCEDURE — 1123F ACP DISCUSS/DSCN MKR DOCD: CPT | Performed by: STUDENT IN AN ORGANIZED HEALTH CARE EDUCATION/TRAINING PROGRAM

## 2024-03-13 PROCEDURE — 87635 SARS-COV-2 COVID-19 AMP PRB: CPT | Performed by: STUDENT IN AN ORGANIZED HEALTH CARE EDUCATION/TRAINING PROGRAM

## 2024-03-13 PROCEDURE — G8484 FLU IMMUNIZE NO ADMIN: HCPCS | Performed by: STUDENT IN AN ORGANIZED HEALTH CARE EDUCATION/TRAINING PROGRAM

## 2024-03-13 PROCEDURE — 99213 OFFICE O/P EST LOW 20 MIN: CPT | Performed by: STUDENT IN AN ORGANIZED HEALTH CARE EDUCATION/TRAINING PROGRAM

## 2024-03-13 PROCEDURE — 1036F TOBACCO NON-USER: CPT | Performed by: STUDENT IN AN ORGANIZED HEALTH CARE EDUCATION/TRAINING PROGRAM

## 2024-03-13 PROCEDURE — G8427 DOCREV CUR MEDS BY ELIG CLIN: HCPCS | Performed by: STUDENT IN AN ORGANIZED HEALTH CARE EDUCATION/TRAINING PROGRAM

## 2024-03-13 PROCEDURE — 3078F DIAST BP <80 MM HG: CPT | Performed by: STUDENT IN AN ORGANIZED HEALTH CARE EDUCATION/TRAINING PROGRAM

## 2024-03-13 PROCEDURE — G8417 CALC BMI ABV UP PARAM F/U: HCPCS | Performed by: STUDENT IN AN ORGANIZED HEALTH CARE EDUCATION/TRAINING PROGRAM

## 2024-03-13 PROCEDURE — 3017F COLORECTAL CA SCREEN DOC REV: CPT | Performed by: STUDENT IN AN ORGANIZED HEALTH CARE EDUCATION/TRAINING PROGRAM

## 2024-03-13 PROCEDURE — 3077F SYST BP >= 140 MM HG: CPT | Performed by: STUDENT IN AN ORGANIZED HEALTH CARE EDUCATION/TRAINING PROGRAM

## 2024-03-13 PROCEDURE — 87502 INFLUENZA DNA AMP PROBE: CPT | Performed by: STUDENT IN AN ORGANIZED HEALTH CARE EDUCATION/TRAINING PROGRAM

## 2024-03-13 ASSESSMENT — ENCOUNTER SYMPTOMS
ABDOMINAL PAIN: 0
SHORTNESS OF BREATH: 0
DIARRHEA: 0
NAUSEA: 0
SINUS PRESSURE: 1
VOMITING: 0
SORE THROAT: 1
COUGH: 1

## 2024-03-13 NOTE — PROGRESS NOTES
[facility]  [facility] PRIMARY CARE  [address]  [place] [place] [address]  Dept: [phone]    Jose Guadalupe Sharpe is a 69 y.o. male established patient, who presents acutely for his complaints as noted below:    Chief Complaint   Patient presents with    Pharyngitis     Started 3 days ago, getting worse.    Cough    Headache     Patient had tried ibuprofen which he says may have helped his symptoms some.       HPI:     Sore throat was initial symptom, productive cough. Had COVID 3 months ago. Using ibuprofen. No Mucinex on board.    Reviewed prior notes from PCP.  Reviewed previous labs.    LDL Cholesterol (mg/dL)   Date Value   04/24/2023 04/04/2022 48   06/16/2021            (goal LDL is <100)   AST (U/L)   Date Value   04/24/2023 20     ALT (U/L)   Date Value   04/24/2023 27     BUN (mg/dL)   Date Value   06/23/2023 19     Hemoglobin A1C (%)   Date Value   08/08/2023 7.2     TSH (uIU/mL)   Date Value   04/24/2023 1.66     BP Readings from Last 3 Encounters:   03/13/24 (!) 142/50   02/01/24 130/70   01/29/24 129/72          (goal 120/80)    Past Medical History:   Diagnosis Date    Asthma     Dr. Nagy Pulmonologist [facility]. 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 Murray PCP    Hypertension     managed by Dr. Murray PCP. Does not have a cardiologist    IBS (irritable bowel syndrome)     Kidney stone     multiple times   Dr. 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

## 2024-06-28 ENCOUNTER — OFFICE VISIT (OUTPATIENT)
Dept: PRIMARY CARE CLINIC | Age: 69
End: 2024-06-28

## 2024-06-28 VITALS
BODY MASS INDEX: 31.37 KG/M2 | OXYGEN SATURATION: 97 % | SYSTOLIC BLOOD PRESSURE: 114 MMHG | HEIGHT: 72 IN | HEART RATE: 79 BPM | WEIGHT: 231.6 LBS | DIASTOLIC BLOOD PRESSURE: 70 MMHG

## 2024-06-28 DIAGNOSIS — K59.00 CONSTIPATION, UNSPECIFIED CONSTIPATION TYPE: ICD-10-CM

## 2024-06-28 DIAGNOSIS — Z00.00 INITIAL MEDICARE ANNUAL WELLNESS VISIT: Primary | ICD-10-CM

## 2024-06-28 DIAGNOSIS — K57.90 DIVERTICULOSIS: Chronic | ICD-10-CM

## 2024-06-28 ASSESSMENT — PATIENT HEALTH QUESTIONNAIRE - PHQ9
1. LITTLE INTEREST OR PLEASURE IN DOING THINGS: NOT AT ALL
SUM OF ALL RESPONSES TO PHQ QUESTIONS 1-9: 0
2. FEELING DOWN, DEPRESSED OR HOPELESS: NOT AT ALL
SUM OF ALL RESPONSES TO PHQ QUESTIONS 1-9: 0
SUM OF ALL RESPONSES TO PHQ9 QUESTIONS 1 & 2: 0
SUM OF ALL RESPONSES TO PHQ QUESTIONS 1-9: 0
SUM OF ALL RESPONSES TO PHQ QUESTIONS 1-9: 0

## 2024-06-28 ASSESSMENT — LIFESTYLE VARIABLES
HOW MANY STANDARD DRINKS CONTAINING ALCOHOL DO YOU HAVE ON A TYPICAL DAY: 1 OR 2
HOW OFTEN DO YOU HAVE A DRINK CONTAINING ALCOHOL: 2-4 TIMES A MONTH

## 2024-06-28 NOTE — PROGRESS NOTES
Medicare Annual Wellness Visit    Jose Guadalupe Sharpe is here for Hypertension (Pt is here for a 6 Month f/u. ) and Medicare AWV    Assessment & Plan   Initial Medicare annual wellness visit  Constipation, unspecified constipation type  -     psyllium (KONSYL) 28.3 % POWD powder; Take 3.4 g by mouth dailyOTC  Diverticulosis  Comments:  on colonoscoy 2023  Dr Pimentel  Increase fiber in diet.  Try Senokot 1 time only.  If his pain is not improved by next week consider antibiotics.    He can also follow-up with the general surgeon he saw for hernia repair in the past.  Although no hernias felt today.    Recommendations for Preventive Services Due: see orders and patient instructions/AVS.  Recommended screening schedule for the next 5-10 years is provided to the patient in written form: see Patient Instructions/AVS.     Return in about 7 months (around 1/28/2025) for hypertension.     Subjective   The following acute and/or chronic problems were also addressed today:  HTN    Has pain in left lateral abdomen area, feels like a hernia for a few days  Has been having constipation issues recently.  Take imodium prn cramping and sometimes for diarrhea.   Tries to watch diet  Not exercising    Patient's complete Health Risk Assessment and screening values have been reviewed and are found in Flowsheets. The following problems were reviewed today and where indicated follow up appointments were made and/or referrals ordered.    Positive Risk Factor Screenings with Interventions:                Activity, Diet, and Weight:  On average, how many days per week do you engage in moderate to strenuous exercise (like a brisk walk)?: 2 days  On average, how many minutes do you engage in exercise at this level?: 20 min    Do you eat balanced/healthy meals regularly?: Yes    Body mass index is 31.4 kg/m². (!) Abnormal    Obesity Interventions:  See AVS for additional education material        Reviewed last colonoscopy 2023, did have some

## 2024-06-28 NOTE — PATIENT INSTRUCTIONS
Sweating.     Shortness of breath.     Pain, pressure, or a strange feeling in the back, neck, jaw, or upper belly or in one or both shoulders or arms.     Lightheadedness or sudden weakness.     A fast or irregular heartbeat.   After you call 911, the  may tell you to chew 1 adult-strength or 2 to 4 low-dose aspirin. Wait for an ambulance. Do not try to drive yourself.  Watch closely for changes in your health, and be sure to contact your doctor if you have any problems.  Where can you learn more?  Go to https://www.[employer].net/patientEd and enter F075 to learn more about \"A Healthy Heart: Care Instructions.\"  Current as of: June 24, 2023  Content Version: 14.1  © 8647-1977 [employer].   Care instructions adapted under license by [employer]. If you have questions about a medical condition or this instruction, always ask your healthcare professional. [employer] disclaims any warranty or liability for your use of this information.      Personalized Preventive Plan for Jose Guadalupe Sharpe - 6/28/2024  Medicare offers a range of preventive health benefits. Some of the tests and screenings are paid in full while other may be subject to a deductible, co-insurance, and/or copay.    Some of these benefits include a comprehensive review of your medical history including lifestyle, illnesses that may run in your family, and various assessments and screenings as appropriate.    After reviewing your medical record and screening and assessments performed today your provider may have ordered immunizations, labs, imaging, and/or referrals for you.  A list of these orders (if applicable) as well as your Preventive Care list are included within your After Visit Summary for your review.    Other Preventive Recommendations:    A preventive eye exam performed by an eye specialist is recommended every 1-2 years to screen for glaucoma; cataracts, macular degeneration, and other eye disorders.  A

## 2024-07-29 ENCOUNTER — OFFICE VISIT (OUTPATIENT)
Dept: UROLOGY | Age: 69
End: 2024-07-29
Payer: MEDICARE

## 2024-07-29 ENCOUNTER — HOSPITAL ENCOUNTER (OUTPATIENT)
Age: 69
Discharge: HOME | End: 2024-07-29
Payer: MEDICARE

## 2024-07-29 VITALS
DIASTOLIC BLOOD PRESSURE: 74 MMHG | HEART RATE: 90 BPM | OXYGEN SATURATION: 97 % | TEMPERATURE: 97.8 F | BODY MASS INDEX: 31.29 KG/M2 | WEIGHT: 231 LBS | HEIGHT: 72 IN | SYSTOLIC BLOOD PRESSURE: 128 MMHG

## 2024-07-29 DIAGNOSIS — N40.1 BPH WITH OBSTRUCTION/LOWER URINARY TRACT SYMPTOMS: ICD-10-CM

## 2024-07-29 DIAGNOSIS — R39.15 URGENCY OF URINATION: Primary | ICD-10-CM

## 2024-07-29 DIAGNOSIS — Z12.5 PROSTATE CANCER SCREENING: ICD-10-CM

## 2024-07-29 DIAGNOSIS — R31.0 GROSS HEMATURIA: ICD-10-CM

## 2024-07-29 DIAGNOSIS — N13.8 BPH WITH OBSTRUCTION/LOWER URINARY TRACT SYMPTOMS: ICD-10-CM

## 2024-07-29 LAB
POST VOID RESIDUAL (PVR): NORMAL ML
PSA SERPL-MCNC: <0.03 NG/ML (ref 0–4)

## 2024-07-29 PROCEDURE — 3078F DIAST BP <80 MM HG: CPT | Performed by: UROLOGY

## 2024-07-29 PROCEDURE — G8427 DOCREV CUR MEDS BY ELIG CLIN: HCPCS | Performed by: UROLOGY

## 2024-07-29 PROCEDURE — G0103 PSA SCREENING: HCPCS

## 2024-07-29 PROCEDURE — 51798 US URINE CAPACITY MEASURE: CPT | Performed by: UROLOGY

## 2024-07-29 PROCEDURE — 3017F COLORECTAL CA SCREEN DOC REV: CPT | Performed by: UROLOGY

## 2024-07-29 PROCEDURE — 1123F ACP DISCUSS/DSCN MKR DOCD: CPT | Performed by: UROLOGY

## 2024-07-29 PROCEDURE — 99214 OFFICE O/P EST MOD 30 MIN: CPT | Performed by: UROLOGY

## 2024-07-29 PROCEDURE — G8417 CALC BMI ABV UP PARAM F/U: HCPCS | Performed by: UROLOGY

## 2024-07-29 PROCEDURE — 3074F SYST BP LT 130 MM HG: CPT | Performed by: UROLOGY

## 2024-07-29 PROCEDURE — 1036F TOBACCO NON-USER: CPT | Performed by: UROLOGY

## 2024-07-29 PROCEDURE — 36415 COLL VENOUS BLD VENIPUNCTURE: CPT

## 2024-07-29 RX ORDER — MIRABEGRON 50 MG/1
50 TABLET, FILM COATED, EXTENDED RELEASE ORAL DAILY
Qty: 90 TABLET | Refills: 1 | Status: SHIPPED | OUTPATIENT
Start: 2024-07-29

## 2024-07-29 ASSESSMENT — ENCOUNTER SYMPTOMS
RESPIRATORY NEGATIVE: 1
COUGH: 0
ALLERGIC/IMMUNOLOGIC NEGATIVE: 1
ABDOMINAL PAIN: 0
EYE PAIN: 0
NAUSEA: 0
BACK PAIN: 0
SHORTNESS OF BREATH: 0
EYE REDNESS: 0
EYES NEGATIVE: 1
VOMITING: 0
WHEEZING: 0
COLOR CHANGE: 0
GASTROINTESTINAL NEGATIVE: 1

## 2024-07-29 NOTE — PROGRESS NOTES
Review of Systems   Constitutional: Negative.  Negative for appetite change, chills and fever.   HENT: Negative.     Eyes: Negative.  Negative for pain, redness and visual disturbance.   Respiratory: Negative.  Negative for cough, shortness of breath and wheezing.    Cardiovascular: Negative.  Negative for chest pain and leg swelling.   Gastrointestinal: Negative.  Negative for abdominal pain, nausea and vomiting.   Endocrine: Negative.    Genitourinary:  Negative for difficulty urinating, dysuria, flank pain, frequency, hematuria, testicular pain and urgency.   Musculoskeletal: Negative.  Negative for back pain, joint swelling and myalgias.   Skin: Negative.  Negative for color change, rash and wound.   Allergic/Immunologic: Negative.    Neurological: Negative.  Negative for dizziness, tremors, weakness, numbness and headaches.   Hematological: Negative.  Negative for adenopathy. Does not bruise/bleed easily.   Psychiatric/Behavioral: Negative.       Indication: Urgency of urination   Diagnosis: Urgency of urination       Patient presents to the office for PVR. PVR obtained per office provider protocol. Patient voided prior, PVR 91ml. Patient tolerated procedure with no complications. Further instructions provided to patient for follow up care.    
90 tablet 3    fenofibrate (TRICOR) 145 MG tablet Take 1 tablet by mouth every other day 45 tablet 3    lovastatin (MEVACOR) 20 MG tablet Take 1 tablet by mouth every morning TAKE 1 TABLET NIGHTLY 90 tablet 2    Insulin Pen Needle (B-D UF III MINI PEN NEEDLES) 31G X 5 MM MISC INJECT 1 PEN NEEDLE INTO   THE SKIN DAILY 90 each 3    JANUMET XR  MG TB24 per extended release tablet Take 1 tablet by mouth daily      blood glucose test strips (ASCENSIA AUTODISC VI;ONE TOUCH ULTRA TEST VI) strip 3x per day on insulin      aspirin 81 MG EC tablet Take 1 tablet by mouth daily Per pcp OK to stop for OR 7 days prior to sx.  Pt stopped on 6/19/2023      ibuprofen (ADVIL;MOTRIN) 200 MG tablet Take 1 tablet by mouth every 6 hours as needed for Pain Takes 800 mg a few times / week for prn headache. Stop 1 week prior to OR      vitamin B-12 (CYANOCOBALAMIN) 100 MCG tablet Take 0.5 tablets by mouth once a week      losartan (COZAAR) 50 MG tablet TAKE 1 TABLET DAILY 90 tablet 3    FARXIGA 5 MG tablet Take 1 tablet by mouth every morning      glimepiride (AMARYL) 4 MG tablet Take 1 tablet by mouth daily Take 2 tablets once daily      BASAGLAR KWIKPEN 100 UNIT/ML injection pen Inject 76 Units into the skin daily         Seasonal and Icosapent ethyl  Social History     Tobacco Use   Smoking Status Never   Smokeless Tobacco Never     (Ifpatient a smoker, smoking cessation counseling offered)    Social History     Substance and Sexual Activity   Alcohol Use Yes    Comment: once a week 1 beer       REVIEW OF SYSTEMS:  Review of Systems    Physical Exam:      Vitals:    07/29/24 0855   BP: 128/74   Pulse: 90   Temp: 97.8 °F (36.6 °C)   SpO2: 97%     Body mass index is 31.32 kg/m².  Patient is a 69 y.o. male in no acute distress and alert and oriented to person, place and time.  Physical Exam  Constitutional: Patient in no acute distress.  Neuro: Alert and oriented to person, place and time.  Psych: Mood normal, affect normal  Skin: No

## 2024-09-30 ENCOUNTER — OFFICE VISIT (OUTPATIENT)
Dept: UROLOGY | Age: 69
End: 2024-09-30
Payer: MEDICARE

## 2024-09-30 VITALS
DIASTOLIC BLOOD PRESSURE: 80 MMHG | SYSTOLIC BLOOD PRESSURE: 130 MMHG | TEMPERATURE: 98.4 F | HEART RATE: 84 BPM | BODY MASS INDEX: 31.29 KG/M2 | OXYGEN SATURATION: 95 % | WEIGHT: 231 LBS | HEIGHT: 72 IN

## 2024-09-30 DIAGNOSIS — N20.0 KIDNEY STONES: ICD-10-CM

## 2024-09-30 DIAGNOSIS — N40.1 BPH WITH OBSTRUCTION/LOWER URINARY TRACT SYMPTOMS: ICD-10-CM

## 2024-09-30 DIAGNOSIS — N13.8 BPH WITH OBSTRUCTION/LOWER URINARY TRACT SYMPTOMS: ICD-10-CM

## 2024-09-30 DIAGNOSIS — R39.15 URGENCY OF URINATION: Primary | ICD-10-CM

## 2024-09-30 PROCEDURE — 1036F TOBACCO NON-USER: CPT | Performed by: UROLOGY

## 2024-09-30 PROCEDURE — G8427 DOCREV CUR MEDS BY ELIG CLIN: HCPCS | Performed by: UROLOGY

## 2024-09-30 PROCEDURE — 99214 OFFICE O/P EST MOD 30 MIN: CPT | Performed by: UROLOGY

## 2024-09-30 PROCEDURE — 3079F DIAST BP 80-89 MM HG: CPT | Performed by: UROLOGY

## 2024-09-30 PROCEDURE — 51798 US URINE CAPACITY MEASURE: CPT | Performed by: UROLOGY

## 2024-09-30 PROCEDURE — 1123F ACP DISCUSS/DSCN MKR DOCD: CPT | Performed by: UROLOGY

## 2024-09-30 PROCEDURE — 3075F SYST BP GE 130 - 139MM HG: CPT | Performed by: UROLOGY

## 2024-09-30 PROCEDURE — 3017F COLORECTAL CA SCREEN DOC REV: CPT | Performed by: UROLOGY

## 2024-09-30 PROCEDURE — G8417 CALC BMI ABV UP PARAM F/U: HCPCS | Performed by: UROLOGY

## 2024-09-30 RX ORDER — SOLIFENACIN SUCCINATE 5 MG/1
5 TABLET, FILM COATED ORAL DAILY
Qty: 90 TABLET | Refills: 3 | Status: SHIPPED | OUTPATIENT
Start: 2024-09-30

## 2024-09-30 ASSESSMENT — ENCOUNTER SYMPTOMS
COUGH: 0
EYE PAIN: 0
EYE REDNESS: 0
ALLERGIC/IMMUNOLOGIC NEGATIVE: 1
ABDOMINAL PAIN: 0
EYES NEGATIVE: 1
GASTROINTESTINAL NEGATIVE: 1
COLOR CHANGE: 0
BACK PAIN: 0
NAUSEA: 0
WHEEZING: 0
RESPIRATORY NEGATIVE: 1
SHORTNESS OF BREATH: 0
VOMITING: 0

## 2024-09-30 NOTE — PROGRESS NOTES
Review of Systems   Constitutional: Negative.  Negative for appetite change, chills and fever.   HENT: Negative.     Eyes: Negative.  Negative for pain, redness and visual disturbance.   Respiratory: Negative.  Negative for cough, shortness of breath and wheezing.    Cardiovascular: Negative.  Negative for chest pain and leg swelling.   Gastrointestinal: Negative.  Negative for abdominal pain, nausea and vomiting.   Endocrine: Negative.    Genitourinary: Negative.  Negative for difficulty urinating, dysuria, flank pain, frequency, hematuria, testicular pain and urgency.   Musculoskeletal: Negative.  Negative for back pain, joint swelling and myalgias.   Skin: Negative.  Negative for color change, rash and wound.   Allergic/Immunologic: Negative.    Neurological: Negative.  Negative for dizziness, tremors, weakness, numbness and headaches.   Hematological: Negative.  Negative for adenopathy. Does not bruise/bleed easily.   Psychiatric/Behavioral: Negative.       Indication:Urgency of urination   Diagnosis: Urgency of urination       Patient presents to the office for PVR. PVR obtained per office provider protocol. Patient voided prior, PVR 0ml. Patient tolerated procedure with no complications. Further instructions provided to patient for follow up care.    
3.4 g by mouth daily      tamsulosin (FLOMAX) 0.4 MG capsule Take 1 capsule by mouth daily 90 capsule 3    finasteride (PROSCAR) 5 MG tablet Take 1 tablet by mouth daily 90 tablet 3    fenofibrate (TRICOR) 145 MG tablet Take 1 tablet by mouth every other day 45 tablet 3    lovastatin (MEVACOR) 20 MG tablet Take 1 tablet by mouth every morning TAKE 1 TABLET NIGHTLY 90 tablet 2    Insulin Pen Needle (B-D UF III MINI PEN NEEDLES) 31G X 5 MM MISC INJECT 1 PEN NEEDLE INTO   THE SKIN DAILY 90 each 3    JANUMET XR  MG TB24 per extended release tablet Take 1 tablet by mouth daily      blood glucose test strips (ASCENSIA AUTODISC VI;ONE TOUCH ULTRA TEST VI) strip 3x per day on insulin      aspirin 81 MG EC tablet Take 1 tablet by mouth daily Per pcp OK to stop for OR 7 days prior to sx.  Pt stopped on 6/19/2023      ibuprofen (ADVIL;MOTRIN) 200 MG tablet Take 1 tablet by mouth every 6 hours as needed for Pain Takes 800 mg a few times / week for prn headache. Stop 1 week prior to OR      vitamin B-12 (CYANOCOBALAMIN) 100 MCG tablet Take 0.5 tablets by mouth once a week      losartan (COZAAR) 50 MG tablet TAKE 1 TABLET DAILY 90 tablet 3    FARXIGA 5 MG tablet Take 1 tablet by mouth every morning      glimepiride (AMARYL) 4 MG tablet Take 1 tablet by mouth daily Take 2 tablets once daily      BASAGLAR KWIKPEN 100 UNIT/ML injection pen Inject 76 Units into the skin daily         Seasonal and Icosapent ethyl  Social History     Tobacco Use   Smoking Status Never   Smokeless Tobacco Never     (Ifpatient a smoker, smoking cessation counseling offered)    Social History     Substance and Sexual Activity   Alcohol Use Yes    Comment: once a week 1 beer       REVIEW OF SYSTEMS:  Review of Systems    Physical Exam:      Vitals:    09/30/24 1001   BP: 130/80   Pulse: 84   Temp: 98.4 °F (36.9 °C)   SpO2: 95%     Body mass index is 31.32 kg/m².  Patient is a 69 y.o. male in no acute distress and alert and oriented to person, place

## 2024-12-02 ENCOUNTER — OFFICE VISIT (OUTPATIENT)
Dept: UROLOGY | Age: 69
End: 2024-12-02
Payer: MEDICARE

## 2024-12-02 VITALS
TEMPERATURE: 97.3 F | SYSTOLIC BLOOD PRESSURE: 138 MMHG | DIASTOLIC BLOOD PRESSURE: 88 MMHG | HEART RATE: 86 BPM | WEIGHT: 231 LBS | OXYGEN SATURATION: 96 % | BODY MASS INDEX: 31.29 KG/M2 | HEIGHT: 72 IN

## 2024-12-02 DIAGNOSIS — R39.12 WEAK URINARY STREAM: ICD-10-CM

## 2024-12-02 DIAGNOSIS — N13.8 BPH WITH OBSTRUCTION/LOWER URINARY TRACT SYMPTOMS: ICD-10-CM

## 2024-12-02 DIAGNOSIS — N40.1 BPH WITH OBSTRUCTION/LOWER URINARY TRACT SYMPTOMS: ICD-10-CM

## 2024-12-02 DIAGNOSIS — R39.15 URGENCY OF URINATION: Primary | ICD-10-CM

## 2024-12-02 DIAGNOSIS — R35.0 URINARY FREQUENCY: ICD-10-CM

## 2024-12-02 PROCEDURE — 99214 OFFICE O/P EST MOD 30 MIN: CPT | Performed by: UROLOGY

## 2024-12-02 PROCEDURE — G8417 CALC BMI ABV UP PARAM F/U: HCPCS | Performed by: UROLOGY

## 2024-12-02 PROCEDURE — 3079F DIAST BP 80-89 MM HG: CPT | Performed by: UROLOGY

## 2024-12-02 PROCEDURE — G8484 FLU IMMUNIZE NO ADMIN: HCPCS | Performed by: UROLOGY

## 2024-12-02 PROCEDURE — 1159F MED LIST DOCD IN RCRD: CPT | Performed by: UROLOGY

## 2024-12-02 PROCEDURE — 1123F ACP DISCUSS/DSCN MKR DOCD: CPT | Performed by: UROLOGY

## 2024-12-02 PROCEDURE — 1036F TOBACCO NON-USER: CPT | Performed by: UROLOGY

## 2024-12-02 PROCEDURE — 3017F COLORECTAL CA SCREEN DOC REV: CPT | Performed by: UROLOGY

## 2024-12-02 PROCEDURE — G8427 DOCREV CUR MEDS BY ELIG CLIN: HCPCS | Performed by: UROLOGY

## 2024-12-02 PROCEDURE — 51798 US URINE CAPACITY MEASURE: CPT | Performed by: UROLOGY

## 2024-12-02 PROCEDURE — 3075F SYST BP GE 130 - 139MM HG: CPT | Performed by: UROLOGY

## 2024-12-02 ASSESSMENT — ENCOUNTER SYMPTOMS
SHORTNESS OF BREATH: 0
EYE PAIN: 0
WHEEZING: 0
BACK PAIN: 0
NAUSEA: 0
DIARRHEA: 0
CONSTIPATION: 0
COUGH: 0
EYE REDNESS: 0
ABDOMINAL PAIN: 0
VOMITING: 0

## 2024-12-02 NOTE — PROGRESS NOTES
Review of Systems   Constitutional:  Negative for chills, fatigue and fever.   Eyes:  Negative for pain, redness and visual disturbance.   Respiratory:  Negative for cough, shortness of breath and wheezing.    Cardiovascular:  Negative for chest pain and leg swelling.   Gastrointestinal:  Negative for abdominal pain, constipation, diarrhea, nausea and vomiting.   Genitourinary:  Negative for difficulty urinating, dysuria, flank pain, frequency, hematuria, scrotal swelling, testicular pain and urgency.   Musculoskeletal:  Negative for back pain, joint swelling and myalgias.   Skin:  Negative for rash and wound.   Neurological:  Negative for dizziness, tremors, weakness and numbness.   Hematological:  Does not bruise/bleed easily.     
allergies     Hearing aid worn     MICHAEL    Hematuria     Hx of lithotripsy     Hyperlipidemia     managed by Dr. Murray PCP    Hypertension     managed by Dr. Murray PCP. Does not have a cardiologist    IBS (irritable bowel syndrome)     Kidney stone     multiple times   Dr. 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 Weir Endocrinologist    Under care of team 09/23/2022    Dr. Murray   [place], [place]  approx 3/2023 last visit    Under care of team     Dr. Weir   endocrinology last visit April 2023    Under care of team 09/2022    Dr. Reza Chappell  surgeon [facility]  hernia repair    Ureteral stent retained     in place x 2    Wears glasses      Past Surgical History:   Procedure Laterality Date    BLADDER TUMOR EXCISION  2009    benign    BRONCHOSCOPY      COLONOSCOPY  09/08/2015    2 times with polypectomy- Dr. Bal     CYSTO/URETERO/PYELOSCOPY, CALCULUS TX Right 06/17/2020    HOLMIUM LASER, CYSTOSCOPY, URETEROSCOPY, STENT PLACEMENT performed by Cy Clemens MD at [facility] OR    CYSTOSCOPY      CYSTOSCOPY Left 09/21/2015    2 stents in Lt.    CYSTOSCOPY  09/30/2015    EXTRACORPOREAL SHOCK WAVE LITHOTRIPSY Left 07/06/2023    EYE SURGERY      cataract surgery both eyes    HERNIA REPAIR Left     inguinal    INGUINAL HERNIA REPAIR Bilateral 09/28/2022    HERNIA INGUINAL REPAIR LAPAROSCOPIC BILATERAL ROBOTIC XI performed by Reza Chappell DO at [facility] OR    KNEE ARTHROSCOPY      LITHOTRIPSY  09/30/2015    laser    LITHOTRIPSY Left 03/05/2021    ESWL EXTRACORPOREAL SHOCK WAVE LITHOTRIPSY  (NEX-MED CONF# [id number] - DAISY) performed by Cy Clemens MD at [facility] OR    LITHOTRIPSY Left 7/6/2023    ESWL EXTRACORPOREAL SHOCK

## 2024-12-09 LAB
ESTIMATED AVERAGE GLUCOSE: NORMAL
HBA1C MFR BLD: 8.2 %

## 2024-12-09 NOTE — PROGRESS NOTES
[address] [place] [address] PRIMARY CARE  [id number] [facility]  Dept: [phone]  Dept Fax: [phone]    Bethany Crew a 59 y.o. male who presents today as an new patient for his medical conditionsas noted below. Chief Complaint   Patient presents with    [facility]       HPI:     Hypertension   This is a chronic problem. The current episode started more than 1 year ago. The problem is unchanged. The problem is controlled. Associated symptoms include shortness of breath (with stairs: about the same. ). Pertinent negatives include no palpitations. There are no associated agents to hypertension. Risk factors for coronary artery disease include male gender and diabetes mellitus. Past treatments include angiotensin blockers. The current treatment provides significant improvement. There are no compliance problems. There is no history of kidney disease or CAD/MI. Sees endocrinologist for DM. ( Dr Jaylyn Smith)  Sees urology ( Dr Mian Sears)  Had colonoscopy: 9-8-15 Dr Evelyn Chao.      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       Contraceptives Protocol Passed - 12/9/2024 10:24 AM        Passed - Patient is not a current smoker if age is 35 or older        Passed - Medication is active on med list        Passed - Recent (12 mo) or future (90 days) visit within the authorizing provider's specialty     The patient must have completed an in-person or virtual visit within the past 12 months or has a future visit scheduled within the next 90 days with the authorizing provider s specialty.  Urgent care and e-visits do not qualify as an office visit for this protocol.          Passed - Medication indicated for associated diagnosis     Medication is associated with one or more of the following diagnoses:  Contraception  Acne  Dysmenorrhea  Menorrhagia  Amenorrhea  PCOS  Premenstrual Dysphoric Disorder  Irregular menses  Endometriosis  Contraceptive counseling  Finding of menstrual bleeding  Education about oral contraception  Uses contraception  Initial prescription of oral contraception  Oral contraception-no problem  Oral contraceptive repeat          Passed - No active pregnancy on record        Passed - No positive pregnancy test in past 12 months           Last Written Prescription Date:  2/15/24  Last Fill Quantity: 84,  # refills: 3   Last office visit: 2/15/2024 ; last virtual visit: Visit date not found with prescribing provider:  Dr. Valenzuela   Future Office Visit:      Medication is being filled for 1 time refill only due to:   almost due for annual  Arlin Ramos RN on 12/9/2024 at 10:26 AM         diabetes mellitus without mention of complication, not stated as uncontrolled     Ureteral stent retained     in place x 2    Wears glasses       Past Surgical History:   Procedure Laterality Date    BLADDER TUMOR EXCISION  2009    benign    BRONCHOSCOPY      COLONOSCOPY  09/08/2015    2 times with polypectomy- Dr. Olga Sanchez Left 9-21-15    2 stents in Lt.  CYSTOSCOPY  09/30/2015    HERNIA REPAIR Left     inguinal    KNEE ARTHROSCOPY      LITHOTRIPSY Left     x2    LITHOTRIPSY  09/30/15    laser    LITHOTRIPSY Left 01/10/2018    LUNG SURGERY Right 5/23/2011    right lower lobe \"lung carcinoid\" removal    MT FRAGMENT KIDNEY STONE/ ESWL Left 1/10/2018    ESWL EXTRACORPEAL SHOCK WAVE LITHOTRIPSY, POSSIBLE  STENT PLACEMENT ([facility] CONF# [id number]) performed by Michael Lucia MD at [facility] / [address] Left 6/9/2014    Excision lesion of nose. Dr. Alfonzo Noguera.     SHOULDER SURGERY Right     closed manipulation    SHOULDER SURGERY Left     open manipulation    SKIN BIOPSY  11/12/2007    meir acilla and neck--squamous papillomas--lft buttock--subcutaneous abscess, back--compound nevus, lft neck--lentigo simplex, lft chest --junctional nevus, rt forearm--blue nevus    SKIN BIOPSY  12/9/2009    lft thigh--follicular cyst    SKIN BIOPSY  2/10/2010    rt thigh--ruptured epidermal inclusion cyst    TONSILLECTOMY      URETER STENT PLACEMENT Left 09/30/15    tiems 2 stents       Family History   Problem Relation Age of Onset    Heart Disease Mother     Diabetes Mother     Heart Disease Father     Diabetes Sister     Diabetes Maternal Grandmother           Social History     Tobacco Use    Smoking status: Never Smoker    Smokeless tobacco: Never Used   Substance Use Topics    Alcohol use: Yes     Comment: once a week         Current Outpatient Medications   Medication Sig Dispense Refill    FARXIGA 5 MG tablet Take 5 mg by mouth every morning  glimepiride (AMARYL) 4 MG tablet Take 4 mg by mouth 2 times daily       BASAGLAR KWIKPEN 100 UNIT/ML injection pen Inject 58 Units into the skin daily       Insulin Pen Needle (B-D UF III MINI PEN NEEDLES) 31G X 5 MM MISC Inject 1 each into the skin daily 100 each 3    losartan (COZAAR) 50 MG tablet Take 1 tablet by mouth daily 90 tablet 3    montelukast (SINGULAIR) 10 MG tablet Take 1 tablet by mouth nightly 90 tablet 3    lovastatin (MEVACOR) 20 MG tablet Take 1 tablet by mouth nightly 90 tablet 3    VASCEPA 1 g CAPS capsule Take 2 capsules by mouth daily 180 capsule 3    aspirin 81 MG chewable tablet Take 1 tablet by mouth daily Hold for 5 days after surgery, until 1/16/18 (Patient taking differently: Take 81 mg by mouth daily ) 30 tablet 0    VENTOLIN  (90 BASE) MCG/ACT inhaler inhale 2 puff every 4 hours prn  0    JANUMET XR  MG TB24 tablet Take 2 tablets by mouth daily       No current facility-administered medications for this visit. Allergies   Allergen Reactions    Seasonal        Health Maintenance   Topic Date Due    Hepatitis C screen  1955    HIV screen  02/19/1970    DTaP/Tdap/Td vaccine (1 - Tdap) 02/19/1974    Shingles Vaccine (1 of 2) 02/19/2005    Colon cancer screen colonoscopy  02/19/2005    A1C test (Diabetic or Prediabetic)  12/23/2019    Potassium monitoring  02/21/2020    Creatinine monitoring  02/21/2020    Lipid screen  12/23/2023    Flu vaccine  Completed    Pneumococcal 0-64 years Vaccine  Aged Out       Subjective:     Review of Systems   Constitutional: Negative for activity change. Respiratory: Positive for shortness of breath (with stairs: about the same. ). Cardiovascular: Negative for palpitations. Musculoskeletal: Positive for arthralgias. Right ant hip pain when driving a lot  Some back and hip pains   Neurological: Negative for dizziness and light-headedness. doesn't do much walking.    Having trouble getting his pen needles    Kidney stones  Objective:     /70   Pulse 86   Ht 5' 10.25\" (1.784 m)   Wt 232 lb 3.2 oz (105.3 kg)   SpO2 98%   BMI 33.08 kg/m²   Physical Exam   Constitutional: He is oriented to person, place, and time. He appears well-developed and well-nourished. No distress. HENT:   Head: Normocephalic and atraumatic. Right Ear: External ear normal.   Left Ear: External ear normal.   Mouth/Throat: Oropharynx is clear and moist. No oropharyngeal exudate. Pao hearing aides     Eyes: Pupils are equal, round, and reactive to light. Conjunctivae are normal. Right eye exhibits no discharge. Left eye exhibits no discharge. No scleral icterus. Neck: No tracheal deviation present. No thyromegaly present. Cardiovascular: Normal rate, regular rhythm, normal heart sounds and intact distal pulses. No murmur heard. No carotid bruits   Pulmonary/Chest: Effort normal and breath sounds normal. No respiratory distress. He has no wheezes. Musculoskeletal: He exhibits no edema. Lymphadenopathy:     He has no cervical adenopathy. Neurological: He is alert and oriented to person, place, and time. Skin: Skin is warm. Capillary refill takes less than 2 seconds. No rash noted. Psychiatric: He has a normal mood and affect. His behavior is normal. Thought content normal.   Nursing note and vitals reviewed. Assessment:       Diagnosis Orders   1. Essential hypertension     2. Immunization due  Pneumococcal conjugate vaccine 13-valent   3. Type 2 diabetes mellitus without complication, with long-term current use of insulin ([facility])  Insulin Pen Needle (B-D UF III MINI PEN NEEDLES) 31G X 5 MM MISC        Plan:       Return in about 6 months (around 12/14/2019) for hypertension. Try walking for exercise. Hip stretches for right thigh numbness.    Try off fenofibrate for cholesterol   Orders Placed This Encounter   Procedures    Pneumococcal conjugate vaccine 13-valent     Orders Placed This Encounter Medications    Insulin Pen Needle (B-D UF III MINI PEN NEEDLES) 31G X 5 MM MISC     Sig: Inject 1 each into the skin daily     Dispense:  100 each     Refill:  3    losartan (COZAAR) 50 MG tablet     Sig: Take 1 tablet by mouth daily     Dispense:  90 tablet     Refill:  3    montelukast (SINGULAIR) 10 MG tablet     Sig: Take 1 tablet by mouth nightly     Dispense:  90 tablet     Refill:  3    lovastatin (MEVACOR) 20 MG tablet     Sig: Take 1 tablet by mouth nightly     Dispense:  90 tablet     Refill:  3    VASCEPA 1 g CAPS capsule     Sig: Take 2 capsules by mouth daily     Dispense:  180 capsule     Refill:  3       Patient given educational materials - see patient instructions. Discussed use, benefit, and side effectsof prescribed medications. All patient questions answered. Pt voiced understanding. Reviewed health maintenance. Instructed to continue current medications, diet and try to exercise. Patient agreed with treatment plan. Follow up as directed.      Electronicallysigned by Óscar Daley MD on 6/14/2019 at 3:25 PM

## 2024-12-16 ENCOUNTER — HOSPITAL ENCOUNTER (OUTPATIENT)
Age: 69
Setting detail: SPECIMEN
Discharge: HOME | End: 2024-12-16

## 2024-12-16 ENCOUNTER — PREP FOR PROCEDURE (OUTPATIENT)
Dept: UROLOGY | Age: 69
End: 2024-12-16

## 2024-12-16 ENCOUNTER — PROCEDURE VISIT (OUTPATIENT)
Dept: UROLOGY | Age: 69
End: 2024-12-16
Payer: MEDICARE

## 2024-12-16 VITALS — TEMPERATURE: 97 F | DIASTOLIC BLOOD PRESSURE: 81 MMHG | HEART RATE: 76 BPM | SYSTOLIC BLOOD PRESSURE: 154 MMHG

## 2024-12-16 DIAGNOSIS — N40.1 BPH WITH OBSTRUCTION/LOWER URINARY TRACT SYMPTOMS: Primary | ICD-10-CM

## 2024-12-16 DIAGNOSIS — N13.8 BPH WITH OBSTRUCTION/LOWER URINARY TRACT SYMPTOMS: Primary | ICD-10-CM

## 2024-12-16 DIAGNOSIS — N40.1 BPH WITH OBSTRUCTION/LOWER URINARY TRACT SYMPTOMS: ICD-10-CM

## 2024-12-16 DIAGNOSIS — N13.8 BPH WITH OBSTRUCTION/LOWER URINARY TRACT SYMPTOMS: ICD-10-CM

## 2024-12-16 DIAGNOSIS — N39.0 URINARY TRACT INFECTION WITHOUT HEMATURIA, SITE UNSPECIFIED: Primary | ICD-10-CM

## 2024-12-16 PROCEDURE — 52000 CYSTOURETHROSCOPY: CPT | Performed by: UROLOGY

## 2024-12-16 NOTE — PROGRESS NOTES
Cystoscopy Operative Note (12/16/24)  Surgeon: Cy Clemens MD  Anesthesia: Urethral 2% Xylocaine   Indications: BPH  Position: Dorsal Lithotomy    Findings:   Risks and Benefits discussed with patient prior to procedure.  The patient was prepped and draped in the usual sterile fashion.  The flexible cystoscope was advanced through the urethra and into the bladder.  The bladder was thoroughly inspected and the following was noted:    Residual Urine: mild  Urethra: normal appearing urethra with no masses, tenderness or lesions  Prostate: completely obstructing lateral lobes of prostate; median lobe present? yes.   Bladder: No tumors or CIS noted.  No bladder diverticulum.  There was mild trabeculation noted.  Ureters: Clear efflux from both ureters.  Orifices with normal configuration and location.    The cystoscope was removed.  The patient tolerated the procedure well.     Agree with the ROS entered by the MA.    Plan: Greenlight XPS laser photo vaporization of the prostate

## 2024-12-17 ENCOUNTER — TELEPHONE (OUTPATIENT)
Dept: UROLOGY | Age: 69
End: 2024-12-17

## 2024-12-17 LAB
MICROORGANISM SPEC CULT: NORMAL
SERVICE CMNT-IMP: NORMAL
SPECIMEN DESCRIPTION: NORMAL

## 2024-12-17 NOTE — TELEPHONE ENCOUNTER
Jimy Anand @ [facility] 01/31/25 10:30am   **STOP BLOOD THINNERS on 01/24/25**    PAT: 01/16/25 @ 10:00am     Spoke with patient in office, procedure information given to patient.

## 2024-12-19 RX ORDER — INSULIN GLARGINE 100 [IU]/ML
84 INJECTION, SOLUTION SUBCUTANEOUS DAILY
Qty: 75.6 ML | Refills: 0 | Status: SHIPPED
Start: 2024-12-19 | End: 2025-03-19

## 2025-01-10 RX ORDER — SODIUM CHLORIDE, SODIUM LACTATE, POTASSIUM CHLORIDE, CALCIUM CHLORIDE 600; 310; 30; 20 MG/100ML; MG/100ML; MG/100ML; MG/100ML
INJECTION, SOLUTION INTRAVENOUS CONTINUOUS
OUTPATIENT
Start: 2025-01-10

## 2025-01-10 NOTE — DISCHARGE INSTRUCTIONS
Pre-operative Instructions    Please arrive at the surgery center by 8:30 AM on 1/31/2025  (or as directed by your surgeon's office). See Directons to Surgery Center below.                FASTING    NOTHING TO EAT OR DRINK AFTER MIDNIGHT the night prior to surgery (This includes gum, candy, mints, chewing tobacco, etc).                 MEDICATIONS    What to STOP: ANY BLOOD THINNING MEDICATION(S) as directed by your surgeon or prescribing physician.  FAILURE TO STOP CERTAIN MEDICATIONS MAY INTERFERE WITH YOUR SCHEDULED SURGERY.      According to the medication list you provided today, PLEASE STOP ASPIRIN 7 DAYS PRIOR TO SURGERY, ACCORDING TO YOUR PRIMARY CARE PROVIDER; ibuprofen, omega-3    2. What to CONTINUE leading up to your surgery:   Please take all your other daily medications except the medications listed above that you were instructed to hold.    3. What to TAKE MORNING OF SURGERY with SMALL SIP OF WATER:     Hold losartan morning of surgery.    ANY \"STOPPED\" MEDICATIONS MAY BE DUE TO CLEANING UP MEDICATION LIST DURING THIS VISIT.                        IF APPLICABLE:  -If you have been given a blood band, you must bring it with you the day of surgery, unclasped.  -Use routine inhalers and bring inhalers the day of surgery.   -Bring C-Pap/Bi-pap with you morning of surgery if planning on staying in the hospital overnight.  -Do not take diabetic medications on the day of surgery.  -Any weekly antidiabetic injections must be stopped one week prior to surgery and plan discussed with prescribing provider.                             OTHER IMPORTANT REMINDERS    1) You may be required to provide a urine sample upon your arrival to the pre-op area, so please take this into consideration.     2) If  NOT planning on staying in the hospital overnight :    A.You will need an adult family member /friend to drive you home after your procedure. Taxi cabs or any form of public transportation ALONE is not acceptable.    -Your  must be 18 years of age or older and able to sign off on your discharge instructions.     -It is preferable that the friend or family member stay at the hospital throughout your procedure.  B. Someone must remain with you once you have arrived home for the first 24 hours after your surgery if you receive anesthesia or medication.  If you do not have someone to stay with you, your procedure may be cancelled.    4) Do not wear any jewelry or body piercings day of surgery.     5) In case of illness - If you have cold or flu like symptoms (high fever, runny nose, sore throat, cough, etc.) rash, nausea, vomiting, loose stools, and/or recent contact with someone who has a contagious disease (Covid-19, chicken pox, measles, etc.) PLEASE notify your surgeon as soon as possible.       1/10/25  10:15 AM      ___________________  _______________________  Signature (Provider)              Signature (Patient)     Day of Surgery/Procedure    As a patient at [facility] you can expect quality medical and nursing care that is centered on your individual needs.  Our goal is to make your surgical experience as comfortable as possible  .  Directions to the Surgery Center    [facility] is located at [address] [place] [address].   Please pull into the Emergency/Surgery Center parking lot or there is additional parking across the street.  You will enter the facility under through the glass doors and proceed to registration check-in which is right inside the door. Thereafter you will be directed to the Surgery Center ON THE FIRST FLOOR.     Patient Instructions    ·Please shower the night before and the morning of surgery with an antibacterial soap.  Please use the cleaning solution (bottle) given to you the night before your surgery after your shower. Unless otherwise told by your physician, please do not shave legs or any part of your body below your neck the night before

## 2025-01-15 DIAGNOSIS — N13.8 BPH WITH OBSTRUCTION/LOWER URINARY TRACT SYMPTOMS: ICD-10-CM

## 2025-01-15 DIAGNOSIS — N40.1 BPH WITH OBSTRUCTION/LOWER URINARY TRACT SYMPTOMS: ICD-10-CM

## 2025-01-16 ENCOUNTER — HOSPITAL ENCOUNTER (OUTPATIENT)
Dept: PREADMISSION TESTING | Age: 70
Discharge: HOME | End: 2025-01-20
Payer: MEDICARE

## 2025-01-16 VITALS
HEIGHT: 72 IN | BODY MASS INDEX: 31.56 KG/M2 | OXYGEN SATURATION: 96 % | RESPIRATION RATE: 18 BRPM | WEIGHT: 233 LBS | HEART RATE: 77 BPM | DIASTOLIC BLOOD PRESSURE: 69 MMHG | TEMPERATURE: 97.3 F | SYSTOLIC BLOOD PRESSURE: 148 MMHG

## 2025-01-16 LAB
ANION GAP SERPL CALCULATED.3IONS-SCNC: 10 MMOL/L (ref 9–16)
BUN SERPL-MCNC: 14 MG/DL (ref 8–23)
CALCIUM SERPL-MCNC: 9.4 MG/DL (ref 8.6–10.4)
CHLORIDE SERPL-SCNC: 105 MMOL/L (ref 98–107)
CO2 SERPL-SCNC: 24 MMOL/L (ref 20–31)
CREAT SERPL-MCNC: 1.1 MG/DL (ref 0.7–1.2)
ERYTHROCYTE [DISTWIDTH] IN BLOOD BY AUTOMATED COUNT: 12.9 % (ref 11.8–14.4)
GFR, ESTIMATED: 73 ML/MIN/1.73M2
GLUCOSE SERPL-MCNC: 280 MG/DL (ref 74–99)
HCT VFR BLD AUTO: 43.5 % (ref 40.7–50.3)
HGB BLD-MCNC: 14.9 G/DL (ref 13–17)
MCH RBC QN AUTO: 30.2 PG (ref 25.2–33.5)
MCHC RBC AUTO-ENTMCNC: 34.3 G/DL (ref 28.4–34.8)
MCV RBC AUTO: 88.2 FL (ref 82.6–102.9)
NRBC BLD-RTO: 0 PER 100 WBC
PLATELET # BLD AUTO: 183 K/UL (ref 138–453)
PMV BLD AUTO: 10.6 FL (ref 8.1–13.5)
POTASSIUM SERPL-SCNC: 4.4 MMOL/L (ref 3.7–5.3)
RBC # BLD AUTO: 4.93 M/UL (ref 4.21–5.77)
SODIUM SERPL-SCNC: 139 MMOL/L (ref 136–145)
WBC OTHER # BLD: 7.6 K/UL (ref 3.5–11.3)

## 2025-01-16 PROCEDURE — 93005 ELECTROCARDIOGRAM TRACING: CPT | Performed by: ANESTHESIOLOGY

## 2025-01-16 PROCEDURE — 36415 COLL VENOUS BLD VENIPUNCTURE: CPT

## 2025-01-16 PROCEDURE — 85027 COMPLETE CBC AUTOMATED: CPT

## 2025-01-16 PROCEDURE — 87086 URINE CULTURE/COLONY COUNT: CPT

## 2025-01-16 PROCEDURE — 80048 BASIC METABOLIC PNL TOTAL CA: CPT

## 2025-01-16 RX ORDER — CHLORAL HYDRATE 500 MG
1 CAPSULE ORAL DAILY
COMMUNITY

## 2025-01-16 NOTE — PROGRESS NOTES
Anesthesia Focused Assessment    Hx of anesthesia complications:  no  Family hx of anesthesia complications:  no      Tested positive for Covid-19 in last 8 weeks: no  Upper respiratory infection within the last 4 weeks: no      STOP-BANG Sleep Apnea Questionnaire    SNORE loudly (heard through closed doors)?   Yes  TIRED, fatigued, sleepy during daytime?    No  OBSERVED stopping breathing during sleep?   No  High blood PRESSURE or being treated?    Yes    BMI over 35?        No  AGE over 50?        Yes  NECK circumference over 16\"?     No  GENDER (male)?       Yes             Total 4  High risk 5-8  Intermediate risk 3-4  Low risk 0-2    ----------------------------------------------------------------------------------------------------------------------  SUKHI                              No  If yes, machine?          DM1                                            No  DM2                   Yes, on insulin    Coronary Artery Disease      No  HTN         Yes, on meds  Defib/AICD/Pacemaker               No             Renal Failure                   No  If yes, on dialysis           Active smoker?       No  Drinks alcohol?       No  Illicit drugs?        No  Dentition?        benign      Past Medical History:   Diagnosis Date    Asthma     Dr. Nagy Pulmonologist Wei Cruz. 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 Murray PCP    Hypertension     managed by Dr. Murray PCP. Does not have a cardiologist    IBS (irritable bowel syndrome)     Kidney stone     multiple times   Dr. Clemens    Left ureteral stone

## 2025-01-16 NOTE — H&P
History and Physical    Pt Name: Jose Guadalupe Sharpe  MRN: [id number]  YOB: 1955  Date of evaluation: 1/16/2025  Primary Care Physician: Zara Murray MD    SUBJECTIVE:   History of Chief Complaint:    Jose Guadalupe Sharpe is a 69 y.o. male who presents for PAT appointment. Patient complains of incomplete bladder emptying, urinary frequency, urgency, ongoing for the last two years. He states he did have one episode of hematuria. Patient has a history of BPH with obstruction. Patient has been scheduled for     CYSTOSCOPY GREENLIGHT XPS LASER     Allergies  is allergic to seasonal.  Medications  Prior to Admission medications    Medication Sig Start Date End Date Taking? Authorizing Provider   BASAGLAR KWIKPEN 100 UNIT/ML injection pen Inject 84 Units into the skin daily From endocrinlogist  Patient taking differently: Inject 88 Units into the skin daily From endocrinlogist 12/19/24 3/19/25 Yes Zara Murray MD   solifenacin (VESICARE) 5 MG tablet Take 1 tablet by mouth daily 9/30/24  Yes Cy Clemens MD   MYRBETRIQ 50 MG TB24 Take 50 mg by mouth daily 7/29/24  Yes Cy Clemens MD   psyllium (KONSYL) 28.3 % POWD powder Take 3.4 g by mouth daily 6/28/24  Yes Zara Murray MD   tamsulosin (FLOMAX) 0.4 MG capsule Take 1 capsule by mouth daily 2/9/24  Yes Cy Clemens MD   finasteride (PROSCAR) 5 MG tablet Take 1 tablet by mouth daily 1/29/24  Yes Cy Clemens MD   fenofibrate (TRICOR) 145 MG tablet Take 1 tablet by mouth every other day 1/23/24 1/17/25 Yes Zara Murray MD   lovastatin (MEVACOR) 20 MG tablet Take 1 tablet by mouth every morning TAKE 1 TABLET NIGHTLY 1/23/24 1/16/25 Yes Zara Murray MD   JANUMET XR  MG TB24 per extended release tablet Take 1 tablet by mouth daily 4/18/23  Yes ProviderPina MD   aspirin 81 MG EC tablet Take 1 tablet by mouth daily Per pcp OK to stop for OR 7 days prior to sx.  Pt stopped on 6/19/2023   Yes Provider, MD Pina  97.3 °F (36.3 °C). His blood pressure is 148/69 (abnormal) and his pulse is 77. His respiration is 18 and oxygen saturation is 96%.   CONSTITUTIONAL:alert & oriented x 3, no acute distress. Calm and pleasant.  SKIN:  Warm and dry, no rashes to exposed areas of skin.   HEAD:  Normocephalic, atraumatic.   EYES: PERRL.  EOMs intact. Wearing glasses.  EARS:  Intact and equal bilaterally. Hearing loss; aides bilaterally.  NOSE:  Nares patent.  No rhinorrhea   MOUTH/THROAT:  Mucous membranes pink and moist, teeth appear to be intact.   NECK:supple, good ROM.  LUNGS: Respirations even and non-labored. Clear to auscultation bilaterally, no wheezes, rales, or rhonchi.    CARDIOVASCULAR: Regular rate and rhythm, no murmurs.   ABDOMEN: soft, non-tender, non-distended, bowel sounds active x 4   EXTREMITIES: No edema to bilateral lower extremities.  No varicosities to bilateral lower extremities.   NEUROLOGIC: CN II-XII are grossly intact. Gait is smooth.  Testing:   [id number]  Labs pending: drawn 2025   IMPRESSIONS:   BPH with obstruction.   PLANS:       CYSTOSCOPY GREENLIGHT XPS LASER       HAMILTON Hale CNP  Electronically signed 2025 at 10:54 AM

## 2025-01-17 LAB
EKG ATRIAL RATE: 71 BPM
EKG P AXIS: 28 DEGREES
EKG P-R INTERVAL: 220 MS
EKG Q-T INTERVAL: 400 MS
EKG QRS DURATION: 90 MS
EKG QTC CALCULATION (BAZETT): 434 MS
EKG R AXIS: 84 DEGREES
EKG T AXIS: 68 DEGREES
EKG VENTRICULAR RATE: 71 BPM
MICROORGANISM SPEC CULT: NO GROWTH
SPECIMEN DESCRIPTION: NORMAL

## 2025-01-17 RX ORDER — FENOFIBRATE 145 MG/1
145 TABLET, COATED ORAL EVERY OTHER DAY
Qty: 45 TABLET | Refills: 3 | Status: SHIPPED | OUTPATIENT
Start: 2025-01-17

## 2025-01-21 RX ORDER — TAMSULOSIN HYDROCHLORIDE 0.4 MG/1
0.4 CAPSULE ORAL DAILY
Qty: 90 CAPSULE | Refills: 3 | OUTPATIENT
Start: 2025-01-21

## 2025-01-29 ENCOUNTER — OFFICE VISIT (OUTPATIENT)
Dept: PRIMARY CARE CLINIC | Age: 70
End: 2025-01-29

## 2025-01-29 VITALS
HEART RATE: 85 BPM | BODY MASS INDEX: 30.88 KG/M2 | WEIGHT: 228 LBS | OXYGEN SATURATION: 97 % | DIASTOLIC BLOOD PRESSURE: 70 MMHG | HEIGHT: 72 IN | SYSTOLIC BLOOD PRESSURE: 110 MMHG

## 2025-01-29 DIAGNOSIS — Z23 IMMUNIZATION DUE: ICD-10-CM

## 2025-01-29 DIAGNOSIS — E11.9 TYPE 2 DIABETES MELLITUS WITHOUT COMPLICATION, WITH LONG-TERM CURRENT USE OF INSULIN (HCC): ICD-10-CM

## 2025-01-29 DIAGNOSIS — I10 ESSENTIAL HYPERTENSION: Primary | ICD-10-CM

## 2025-01-29 DIAGNOSIS — Z79.4 TYPE 2 DIABETES MELLITUS WITHOUT COMPLICATION, WITH LONG-TERM CURRENT USE OF INSULIN (HCC): ICD-10-CM

## 2025-01-29 DIAGNOSIS — Z23 NEEDS FLU SHOT: ICD-10-CM

## 2025-01-29 RX ORDER — DAPAGLIFLOZIN 5 MG/1
5 TABLET, FILM COATED ORAL EVERY MORNING
COMMUNITY

## 2025-01-29 SDOH — ECONOMIC STABILITY: FOOD INSECURITY: WITHIN THE PAST 12 MONTHS, THE FOOD YOU BOUGHT JUST DIDN'T LAST AND YOU DIDN'T HAVE MONEY TO GET MORE.: NEVER TRUE

## 2025-01-29 SDOH — ECONOMIC STABILITY: FOOD INSECURITY: WITHIN THE PAST 12 MONTHS, YOU WORRIED THAT YOUR FOOD WOULD RUN OUT BEFORE YOU GOT MONEY TO BUY MORE.: NEVER TRUE

## 2025-01-29 ASSESSMENT — PATIENT HEALTH QUESTIONNAIRE - PHQ9
2. FEELING DOWN, DEPRESSED OR HOPELESS: NOT AT ALL
SUM OF ALL RESPONSES TO PHQ QUESTIONS 1-9: 0
SUM OF ALL RESPONSES TO PHQ9 QUESTIONS 1 & 2: 0
SUM OF ALL RESPONSES TO PHQ QUESTIONS 1-9: 0
SUM OF ALL RESPONSES TO PHQ QUESTIONS 1-9: 0
1. LITTLE INTEREST OR PLEASURE IN DOING THINGS: NOT AT ALL
SUM OF ALL RESPONSES TO PHQ QUESTIONS 1-9: 0

## 2025-01-29 ASSESSMENT — ENCOUNTER SYMPTOMS: RESPIRATORY NEGATIVE: 1

## 2025-01-29 NOTE — PROGRESS NOTES
[facility]  [facility] PRIMARY CARE  [address] [address]  [facility] [address]  Dept: [phone]    Jose Guadalupe Sharpe is a 69 y.o. male Established patient, who presents today for his medical conditions/complaintsas noted below.      Chief Complaint   Patient presents with    Hypertension     Pt is here for a 7 Month f/u.        HPI:     HPI    He doesn't think he is taking myrbetriq  Has dry mouth  Had a bad cold over mario.    Reviewed prior notes  urology  Reviewed previous Labs and Imaging  Has dry mouth from fraxiga, restarted it again, DM control is better but having better sugar control.     No components found for: \"LDLCHOLESTEROL\", \"LDLCALC\"    (goal LDL is <100)   AST (U/L)   Date Value   04/24/2023 20     ALT (U/L)   Date Value   04/24/2023 27     BUN (mg/dL)   Date Value   01/16/2025 14     Hemoglobin A1C (%)   Date Value   08/08/2023 7.2     TSH (uIU/mL)   Date Value   04/24/2023 1.66     BP Readings from Last 3 Encounters:   01/29/25 110/70   01/16/25 (!) 148/69   12/16/24 (!) 154/81          (goal 120/80)    Past Medical History:   Diagnosis Date    Asthma     Dr. Nagy Pulmonologist Wei Cruz. 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 Murray PCP    Hypertension     managed by Dr. Murray PCP. Does not have a cardiologist    IBS (irritable bowel syndrome)     Kidney stone     multiple times   Dr. Clemens    Left ureteral stone     Neoplasm of unspecified nature of bone, soft tissue, and skin 05/20/2014    lesion of left side of nose    Osteoarthritis     hands

## 2025-01-30 NOTE — DISCHARGE INSTRUCTIONS
Discharge instructions: Greenlight Photovaporization of the prostate:   The patient should have CBI weaned off in recovery.  Please call if urine is not clear / pink with CBI.  Traction on the catheter should be released before discharge  Contue flomax for 1 month    You may see blood in the urine after the procedure.  This should resolve over the next couple days.  Please stay hydrated.  You may experience frequency/urgency of urination after the procedure.  We expect these symptoms to improve over the next couple weeks.      Tylenol for pain control  Patient ok to discharge home in good condition  No heavy lifting, >10 lbs for today  Patient should avoid strenuous activity for today  Patient should walk moderately at home  Patient ok to shower   Patient may resume diet as tolerated  Patient should take Rx as directed  No driving while on narcotics  Please call attending physician or hospital  with questions  Call or Present to ED if fever (> 101F), intractable nausea vomiting or pain.    Pt should follow up with Dr. Clemens next week to have catheter removed, call to confirm appointment    Home with lopez catheter.  Please teach lopez education and send home with leg and night bag.  You may see intermittent blood in the urine while the catheter in place.  If the catheter becomes obstructed and needs to be exchanged, please call.      No alcoholic beverages, no driving or operating machinery, no making important decisions for 24 hours.   You may have a normal diet but should eat lightly day of surgery.  Drink plenty of fluids.  Urinate within 8 hours after surgery, if unable to urinate call your doctor

## 2025-01-31 ENCOUNTER — ANESTHESIA (OUTPATIENT)
Dept: OPERATING ROOM | Age: 70
End: 2025-01-31
Payer: MEDICARE

## 2025-01-31 ENCOUNTER — ANESTHESIA EVENT (OUTPATIENT)
Dept: OPERATING ROOM | Age: 70
End: 2025-01-31
Payer: MEDICARE

## 2025-01-31 ENCOUNTER — HOSPITAL ENCOUNTER (OUTPATIENT)
Age: 70
Setting detail: OUTPATIENT SURGERY
Discharge: HOME | End: 2025-01-31
Attending: UROLOGY | Admitting: UROLOGY
Payer: MEDICARE

## 2025-01-31 VITALS
DIASTOLIC BLOOD PRESSURE: 69 MMHG | OXYGEN SATURATION: 96 % | SYSTOLIC BLOOD PRESSURE: 120 MMHG | TEMPERATURE: 97.5 F | RESPIRATION RATE: 15 BRPM | HEART RATE: 81 BPM

## 2025-01-31 DIAGNOSIS — G89.18 POST-OP PAIN: Primary | ICD-10-CM

## 2025-01-31 LAB
GLUCOSE BLD-MCNC: 168 MG/DL (ref 75–110)
GLUCOSE BLD-MCNC: 169 MG/DL (ref 75–110)

## 2025-01-31 PROCEDURE — 3600000004 HC SURGERY LEVEL 4 BASE: Performed by: UROLOGY

## 2025-01-31 PROCEDURE — 7100000001 HC PACU RECOVERY - ADDTL 15 MIN: Performed by: UROLOGY

## 2025-01-31 PROCEDURE — 3700000000 HC ANESTHESIA ATTENDED CARE: Performed by: UROLOGY

## 2025-01-31 PROCEDURE — 6360000002 HC RX W HCPCS: Performed by: PHYSICIAN ASSISTANT

## 2025-01-31 PROCEDURE — 7100000011 HC PHASE II RECOVERY - ADDTL 15 MIN: Performed by: UROLOGY

## 2025-01-31 PROCEDURE — 2709999900 HC NON-CHARGEABLE SUPPLY: Performed by: UROLOGY

## 2025-01-31 PROCEDURE — 7100000010 HC PHASE II RECOVERY - FIRST 15 MIN: Performed by: UROLOGY

## 2025-01-31 PROCEDURE — 82947 ASSAY GLUCOSE BLOOD QUANT: CPT

## 2025-01-31 PROCEDURE — 2500000003 HC RX 250 WO HCPCS: Performed by: UROLOGY

## 2025-01-31 PROCEDURE — 7100000000 HC PACU RECOVERY - FIRST 15 MIN: Performed by: UROLOGY

## 2025-01-31 PROCEDURE — 3600000014 HC SURGERY LEVEL 4 ADDTL 15MIN: Performed by: UROLOGY

## 2025-01-31 PROCEDURE — 6360000002 HC RX W HCPCS

## 2025-01-31 PROCEDURE — 2720000010 HC SURG SUPPLY STERILE: Performed by: UROLOGY

## 2025-01-31 PROCEDURE — 3700000001 HC ADD 15 MINUTES (ANESTHESIA): Performed by: UROLOGY

## 2025-01-31 PROCEDURE — 2580000003 HC RX 258: Performed by: ANESTHESIOLOGY

## 2025-01-31 RX ORDER — ONDANSETRON 2 MG/ML
4 INJECTION INTRAMUSCULAR; INTRAVENOUS
Status: DISCONTINUED | OUTPATIENT
Start: 2025-01-31 | End: 2025-01-31 | Stop reason: HOSPADM

## 2025-01-31 RX ORDER — ALBUTEROL SULFATE 90 UG/1
2 INHALANT RESPIRATORY (INHALATION) EVERY 6 HOURS PRN
Status: DISCONTINUED | OUTPATIENT
Start: 2025-01-31 | End: 2025-01-31 | Stop reason: HOSPADM

## 2025-01-31 RX ORDER — TRAMADOL HYDROCHLORIDE 50 MG/1
50 TABLET ORAL EVERY 6 HOURS PRN
Qty: 12 TABLET | Refills: 0 | Status: SHIPPED | OUTPATIENT
Start: 2025-01-31 | End: 2025-02-03

## 2025-01-31 RX ORDER — DIPHENHYDRAMINE HYDROCHLORIDE 50 MG/ML
12.5 INJECTION INTRAMUSCULAR; INTRAVENOUS
Status: DISCONTINUED | OUTPATIENT
Start: 2025-01-31 | End: 2025-01-31 | Stop reason: HOSPADM

## 2025-01-31 RX ORDER — DEXAMETHASONE SODIUM PHOSPHATE 4 MG/ML
INJECTION, SOLUTION INTRA-ARTICULAR; INTRALESIONAL; INTRAMUSCULAR; INTRAVENOUS; SOFT TISSUE
Status: DISCONTINUED | OUTPATIENT
Start: 2025-01-31 | End: 2025-01-31 | Stop reason: SDUPTHER

## 2025-01-31 RX ORDER — FENTANYL CITRATE 50 UG/ML
25 INJECTION, SOLUTION INTRAMUSCULAR; INTRAVENOUS EVERY 5 MIN PRN
Status: DISCONTINUED | OUTPATIENT
Start: 2025-01-31 | End: 2025-01-31 | Stop reason: HOSPADM

## 2025-01-31 RX ORDER — PHENAZOPYRIDINE HYDROCHLORIDE 100 MG/1
100 TABLET, FILM COATED ORAL 3 TIMES DAILY PRN
Qty: 30 TABLET | Refills: 0 | Status: SHIPPED | OUTPATIENT
Start: 2025-01-31 | End: 2025-02-10

## 2025-01-31 RX ORDER — ALBUTEROL SULFATE 0.83 MG/ML
2.5 SOLUTION RESPIRATORY (INHALATION) EVERY 8 HOURS PRN
Status: DISCONTINUED | OUTPATIENT
Start: 2025-01-31 | End: 2025-01-31 | Stop reason: HOSPADM

## 2025-01-31 RX ORDER — ONDANSETRON 2 MG/ML
INJECTION INTRAMUSCULAR; INTRAVENOUS
Status: DISCONTINUED | OUTPATIENT
Start: 2025-01-31 | End: 2025-01-31 | Stop reason: SDUPTHER

## 2025-01-31 RX ORDER — FENTANYL CITRATE 50 UG/ML
50 INJECTION, SOLUTION INTRAMUSCULAR; INTRAVENOUS
Status: DISCONTINUED | OUTPATIENT
Start: 2025-01-31 | End: 2025-01-31 | Stop reason: HOSPADM

## 2025-01-31 RX ORDER — LIDOCAINE HYDROCHLORIDE 10 MG/ML
INJECTION, SOLUTION EPIDURAL; INFILTRATION; INTRACAUDAL; PERINEURAL
Status: DISCONTINUED | OUTPATIENT
Start: 2025-01-31 | End: 2025-01-31 | Stop reason: SDUPTHER

## 2025-01-31 RX ORDER — SCOPOLAMINE 1 MG/3D
1 PATCH, EXTENDED RELEASE TRANSDERMAL ONCE
Status: DISCONTINUED | OUTPATIENT
Start: 2025-01-31 | End: 2025-01-31 | Stop reason: HOSPADM

## 2025-01-31 RX ORDER — NALOXONE HYDROCHLORIDE 0.4 MG/ML
INJECTION, SOLUTION INTRAMUSCULAR; INTRAVENOUS; SUBCUTANEOUS PRN
Status: DISCONTINUED | OUTPATIENT
Start: 2025-01-31 | End: 2025-01-31 | Stop reason: HOSPADM

## 2025-01-31 RX ORDER — MIDAZOLAM HYDROCHLORIDE 2 MG/2ML
1 INJECTION, SOLUTION INTRAMUSCULAR; INTRAVENOUS EVERY 10 MIN PRN
Status: DISCONTINUED | OUTPATIENT
Start: 2025-01-31 | End: 2025-01-31 | Stop reason: HOSPADM

## 2025-01-31 RX ORDER — SODIUM CHLORIDE 0.9 % (FLUSH) 0.9 %
5-40 SYRINGE (ML) INJECTION EVERY 12 HOURS SCHEDULED
Status: DISCONTINUED | OUTPATIENT
Start: 2025-01-31 | End: 2025-01-31 | Stop reason: HOSPADM

## 2025-01-31 RX ORDER — SODIUM CHLORIDE, SODIUM LACTATE, POTASSIUM CHLORIDE, CALCIUM CHLORIDE 600; 310; 30; 20 MG/100ML; MG/100ML; MG/100ML; MG/100ML
INJECTION, SOLUTION INTRAVENOUS CONTINUOUS
Status: DISCONTINUED | OUTPATIENT
Start: 2025-01-31 | End: 2025-01-31 | Stop reason: HOSPADM

## 2025-01-31 RX ORDER — FENTANYL CITRATE 50 UG/ML
50 INJECTION, SOLUTION INTRAMUSCULAR; INTRAVENOUS EVERY 5 MIN PRN
Status: DISCONTINUED | OUTPATIENT
Start: 2025-01-31 | End: 2025-01-31 | Stop reason: HOSPADM

## 2025-01-31 RX ORDER — MAGNESIUM HYDROXIDE 1200 MG/15ML
LIQUID ORAL CONTINUOUS PRN
Status: DISCONTINUED | OUTPATIENT
Start: 2025-01-31 | End: 2025-01-31 | Stop reason: HOSPADM

## 2025-01-31 RX ORDER — SODIUM CHLORIDE 9 MG/ML
INJECTION, SOLUTION INTRAVENOUS PRN
Status: DISCONTINUED | OUTPATIENT
Start: 2025-01-31 | End: 2025-01-31 | Stop reason: HOSPADM

## 2025-01-31 RX ORDER — SODIUM CHLORIDE 0.9 % (FLUSH) 0.9 %
5-40 SYRINGE (ML) INJECTION PRN
Status: DISCONTINUED | OUTPATIENT
Start: 2025-01-31 | End: 2025-01-31 | Stop reason: HOSPADM

## 2025-01-31 RX ORDER — FENTANYL CITRATE 50 UG/ML
25 INJECTION, SOLUTION INTRAMUSCULAR; INTRAVENOUS
Status: DISCONTINUED | OUTPATIENT
Start: 2025-01-31 | End: 2025-01-31 | Stop reason: HOSPADM

## 2025-01-31 RX ORDER — FENTANYL CITRATE 50 UG/ML
INJECTION, SOLUTION INTRAMUSCULAR; INTRAVENOUS
Status: DISCONTINUED | OUTPATIENT
Start: 2025-01-31 | End: 2025-01-31 | Stop reason: SDUPTHER

## 2025-01-31 RX ORDER — PROPOFOL 10 MG/ML
INJECTION, EMULSION INTRAVENOUS
Status: DISCONTINUED | OUTPATIENT
Start: 2025-01-31 | End: 2025-01-31 | Stop reason: SDUPTHER

## 2025-01-31 RX ADMIN — FENTANYL CITRATE 25 MCG: 50 INJECTION, SOLUTION INTRAMUSCULAR; INTRAVENOUS at 11:20

## 2025-01-31 RX ADMIN — DEXAMETHASONE SODIUM PHOSPHATE 4 MG: 4 INJECTION, SOLUTION INTRAMUSCULAR; INTRAVENOUS at 11:17

## 2025-01-31 RX ADMIN — ONDANSETRON 4 MG: 2 INJECTION INTRAMUSCULAR; INTRAVENOUS at 11:17

## 2025-01-31 RX ADMIN — PROPOFOL 200 MG: 10 INJECTION, EMULSION INTRAVENOUS at 11:14

## 2025-01-31 RX ADMIN — Medication 2000 MG: at 11:17

## 2025-01-31 RX ADMIN — FENTANYL CITRATE 25 MCG: 50 INJECTION, SOLUTION INTRAMUSCULAR; INTRAVENOUS at 11:10

## 2025-01-31 RX ADMIN — FENTANYL CITRATE 25 MCG: 50 INJECTION, SOLUTION INTRAMUSCULAR; INTRAVENOUS at 11:14

## 2025-01-31 RX ADMIN — FENTANYL CITRATE 25 MCG: 50 INJECTION, SOLUTION INTRAMUSCULAR; INTRAVENOUS at 11:33

## 2025-01-31 RX ADMIN — LIDOCAINE HYDROCHLORIDE 50 MG: 10 INJECTION, SOLUTION EPIDURAL; INFILTRATION; INTRACAUDAL; PERINEURAL at 11:14

## 2025-01-31 RX ADMIN — SODIUM CHLORIDE, POTASSIUM CHLORIDE, SODIUM LACTATE AND CALCIUM CHLORIDE: 600; 310; 30; 20 INJECTION, SOLUTION INTRAVENOUS at 11:10

## 2025-01-31 ASSESSMENT — PAIN - FUNCTIONAL ASSESSMENT: PAIN_FUNCTIONAL_ASSESSMENT: FACE, LEGS, ACTIVITY, CRY, AND CONSOLABILITY (FLACC)

## 2025-01-31 ASSESSMENT — ENCOUNTER SYMPTOMS: SHORTNESS OF BREATH: 1

## 2025-01-31 NOTE — OP NOTE
FACILITY:  [place], [place]  Jose Guadalupe Sharpe   1955   [id number]     DATE:  01/31/25     SURGEON:  Dr. Cy Clemens M.D.     ASSISTANT:  Dr. Luzma Parks MD     PREOPERATIVE DIAGNOSES:  1. Urinary retention.  2. Benign prostatic hyperplasia with obstruction.     POSTOPERATIVE DIAGNOSIS:  1. Urinary retention.  2. Benign prostatic hyperplasia with obstruction.     PROCEDURES PERFORMED:  1. Cystoscopy.  2. GreenLight photovaporization of the prostate.     ANESTHESIA:  General.     COMPLICATIONS:  None.     SPECIMENS:  Urine for culture.     ESTIMATED BLOOD LOSS:  Minimal.     DRAINS:  A 22 Pashto 3-way Faustin catheter.     INDICATIONS:  Jose Guadalupe Sharpe is a 69 y.o. male presents today for GreenLight photovaporization of the prostate. After risks, benefits and alternatives of the procedure were discussed with the patient, informed consent was obtained and the patient elected to proceed.     OPERATIVE SUMMARY:  The risks and benefits of the procedure were explained to the patient in the preoperative area. After informed consent was obtained, the patient was taken back to the operating room. The patient was transferred to the operating table and placed in a supine position. General anesthesia was induced and the patient was placed in the dorsal lithotomy position. He was prepped and draped in a sterile fashion and a time-out was performed to confirm patient identity and procedure. Prior to induction of anesthesia the patient was administered preoperative antibiotics and EPC cuffs were on and functioning. Our continuous flow sheath with obturator and lens was inserted through the patient's urethra and into the bladder. Upon entering the bladder we located both ureteral orifices. On evaluation of the prostate the patient was noted to have no median lobe and bilateral enlarged lateral lobes. The GreenLight fiber was then inserted after we removed our obturator and placed our working bridge

## 2025-01-31 NOTE — ANESTHESIA PRE PROCEDURE
Department of Anesthesiology  Preprocedure Note       Name:  Jose Guadalupe Sharpe   Age:  69 y.o.  :  1955                                          MRN:  [id number]         Date:  2025      Surgeon: Surgeon(s):  Cy Clemens MD    Procedure: Procedure(s):  CYSTOSCOPY GREENLIGHT XPS LASER ([facility] CONF# [id number])    Medications prior to admission:   Prior to Admission medications    Medication Sig Start Date End Date Taking? Authorizing Provider   dapagliflozin (FARXIGA) 5 MG tablet Take 1 tablet by mouth every morning From endocrine doctor    Pina Leon MD   fenofibrate (TRICOR) 145 MG tablet TAKE 1 TABLET EVERY OTHER DAY 25   Zara Murray MD   [place]-3 1000 MG CAPS Take 1 capsule by mouth daily  Patient not taking: Reported on 2025    ProviderPina MD BASAGLAR KWIKPEN 100 UNIT/ML injection pen Inject 84 Units into the skin daily From endocrinlogist  Patient taking differently: Inject 88 Units into the skin daily From endocrinlogist 12/19/24 3/19/25  Zara Murray MD   solifenacin (VESICARE) 5 MG tablet Take 1 tablet by mouth daily 24   Cy Clemens MD   MYRBETRIQ 50 MG TB24 Take 50 mg by mouth daily  Patient not taking: Reported on 2025   Cy Clemens MD   psyllium (KONSYL) 28.3 % POWD powder Take 3.4 g by mouth daily 24   Zara Murray MD   tamsulosin (FLOMAX) 0.4 MG capsule Take 1 capsule by mouth daily 24   Cy Clemens MD   finasteride (PROSCAR) 5 MG tablet Take 1 tablet by mouth daily 24   Cy Clemens MD   lovastatin (MEVACOR) 20 MG tablet Take 1 tablet by mouth every morning TAKE 1 TABLET NIGHTLY 24  Zara Murray MD   Insulin Pen Needle (B-D UF III MINI PEN NEEDLES) 31G X 5 MM MISC INJECT 1 PEN NEEDLE INTO   THE SKIN DAILY 10/16/23   Benitez Lloyd PA   JANUMET XR  MG TB24 per extended release tablet Take 1 tablet by mouth daily 23   Provider, MD Pina   blood

## 2025-01-31 NOTE — ANESTHESIA POSTPROCEDURE EVALUATION
Department of Anesthesiology  Postprocedure Note    Patient: Jose Guadalupe Sharpe  MRN: [id number]  YOB: 1955  Date of evaluation: 1/31/2025    Procedure Summary       Date: 01/31/25 Room / Location: [address]    Anesthesia Start: 1110 Anesthesia Stop: 1212    Procedure: CYSTOSCOPY GREENLIGHT XPS LASER ([facility] CONF# [id number]) Diagnosis:       BPH with obstruction/lower urinary tract symptoms      (BPH with obstruction/lower urinary tract symptoms [N40.1, N13.8])    Surgeons: Cy Clemens MD Responsible Provider: Jacoby Pavon MD    Anesthesia Type: general ASA Status: 3            Anesthesia Type: No value filed.    Jaguar Phase I: Jaguar Score: 10    Jaguar Phase II: Jaguar Score: 10    Anesthesia Post Evaluation    Patient location during evaluation: PACU  Patient participation: complete - patient participated  Level of consciousness: awake  Pain score: 1  Airway patency: patent  Nausea & Vomiting: no nausea and no vomiting  Cardiovascular status: blood pressure returned to baseline and hemodynamically stable  Respiratory status: acceptable  Hydration status: euvolemic  Pain management: adequate    No notable events documented.

## 2025-01-31 NOTE — H&P
Urology History and Physical    Patient:  Jose Guadalupe Sharpe  MRN: [id number]  YOB: 1955    CHIEF COMPLAINT:  BPH    HISTORY OF PRESENT ILLNESS:   The patient is a 69 y.o. male  with BPH and BLACK    Patient's old records, notes and chart reviewed and summarized above.    Past Medical History:    Past Medical History:   Diagnosis Date    Asthma     Dr. Nagy Pulmonologist Wei Cruz. 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 Murrya PCP    Hypertension     managed by Dr. Murray PCP. Does not have a cardiologist    IBS (irritable bowel syndrome)     Kidney stone     multiple times   Dr. 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 Weir Endocrinologist    Under care of team     Dr. Murray   [place], [place]-last visit sep 2024    Under care of team     Dr. Weir   endocrinology last visit dec 2024    Ureteral stent retained     in place x 2    Wears glasses        Past Surgical History:    Past Surgical History:   Procedure Laterality Date    BLADDER TUMOR EXCISION  2009    benign    BRONCHOSCOPY      COLONOSCOPY  09/08/2015    2 times with polypectomy- Dr. Bal     CYSTO/URETERO/PYELOSCOPY, CALCULUS TX Right 06/17/2020    HOLMIUM LASER, CYSTOSCOPY, URETEROSCOPY, STENT PLACEMENT performed by [facility]

## 2025-02-03 ENCOUNTER — OFFICE VISIT (OUTPATIENT)
Dept: UROLOGY | Age: 70
End: 2025-02-03

## 2025-02-03 VITALS
OXYGEN SATURATION: 98 % | BODY MASS INDEX: 30.88 KG/M2 | WEIGHT: 228 LBS | HEIGHT: 72 IN | TEMPERATURE: 97.1 F | SYSTOLIC BLOOD PRESSURE: 136 MMHG | DIASTOLIC BLOOD PRESSURE: 82 MMHG | HEART RATE: 92 BPM

## 2025-02-03 DIAGNOSIS — N40.1 BPH WITH OBSTRUCTION/LOWER URINARY TRACT SYMPTOMS: Primary | ICD-10-CM

## 2025-02-03 DIAGNOSIS — N13.8 BPH WITH OBSTRUCTION/LOWER URINARY TRACT SYMPTOMS: Primary | ICD-10-CM

## 2025-02-03 PROCEDURE — 99024 POSTOP FOLLOW-UP VISIT: CPT | Performed by: NURSE PRACTITIONER

## 2025-02-03 NOTE — PROGRESS NOTES
Indication/Diagnosis: BPH with retention   Patient presents to office for 22 F 3 way catheter removal. Balloon deflated and catheter was removed with no complications. Patient tolerated procedure well. Patient will keep follow up appointment with provider and will call the office with any questions or concerns.

## 2025-02-03 NOTE — PROGRESS NOTES
Pt 3 days post op, s/p Greenlight PVP.    Fever/chills - no  Nausea/vomiting - no  Hematuria - no  Pain - no    Lopez cath removed by MA.  Can d/c flomax.    Pt instructed to drink plenty of fluids, try to urinate q 2 hrs, call office in 6 hrs with update of amount voided, and if not voiding will need to return to office to have lopez replaced.  Also instructed to return to office or ER if goes >6 hrs without voiding or has strong urge to void/suprapubic discomfort and cannot urinate. Pt verbalizes understanding of plan.    F/u 4 weeks with PVR

## 2025-02-12 ENCOUNTER — NURSE ONLY (OUTPATIENT)
Dept: PRIMARY CARE CLINIC | Age: 70
End: 2025-02-12

## 2025-02-12 VITALS
BODY MASS INDEX: 30.64 KG/M2 | WEIGHT: 226 LBS | OXYGEN SATURATION: 97 % | HEART RATE: 81 BPM | DIASTOLIC BLOOD PRESSURE: 68 MMHG | SYSTOLIC BLOOD PRESSURE: 128 MMHG

## 2025-02-12 NOTE — PROGRESS NOTES
After obtaining consent, and per orders of Dr. Murray, injection of 2nd Shingles given in Left deltoid by Sabrina León MA. Patient handled the injection well.

## 2025-03-03 ENCOUNTER — OFFICE VISIT (OUTPATIENT)
Dept: UROLOGY | Age: 70
End: 2025-03-03
Payer: MEDICARE

## 2025-03-03 VITALS
SYSTOLIC BLOOD PRESSURE: 118 MMHG | HEART RATE: 78 BPM | WEIGHT: 226 LBS | DIASTOLIC BLOOD PRESSURE: 76 MMHG | BODY MASS INDEX: 30.61 KG/M2 | OXYGEN SATURATION: 97 % | TEMPERATURE: 97.6 F | HEIGHT: 72 IN

## 2025-03-03 DIAGNOSIS — N40.1 BPH WITH OBSTRUCTION/LOWER URINARY TRACT SYMPTOMS: Primary | ICD-10-CM

## 2025-03-03 DIAGNOSIS — N13.8 BPH WITH OBSTRUCTION/LOWER URINARY TRACT SYMPTOMS: Primary | ICD-10-CM

## 2025-03-03 DIAGNOSIS — R39.15 URGENCY OF URINATION: ICD-10-CM

## 2025-03-03 DIAGNOSIS — Z12.5 PROSTATE CANCER SCREENING: ICD-10-CM

## 2025-03-03 DIAGNOSIS — R35.0 URINARY FREQUENCY: ICD-10-CM

## 2025-03-03 PROCEDURE — 99024 POSTOP FOLLOW-UP VISIT: CPT | Performed by: NURSE PRACTITIONER

## 2025-03-03 PROCEDURE — 51798 US URINE CAPACITY MEASURE: CPT | Performed by: NURSE PRACTITIONER

## 2025-03-03 RX ORDER — SOLIFENACIN SUCCINATE 10 MG/1
10 TABLET, FILM COATED ORAL DAILY
Qty: 30 TABLET | Refills: 3 | Status: SHIPPED | OUTPATIENT
Start: 2025-03-03

## 2025-03-03 ASSESSMENT — ENCOUNTER SYMPTOMS
BACK PAIN: 0
RESPIRATORY NEGATIVE: 1
COUGH: 0
EYE PAIN: 0
EYES NEGATIVE: 1
COLOR CHANGE: 0
VOMITING: 0
ALLERGIC/IMMUNOLOGIC NEGATIVE: 1
WHEEZING: 0
NAUSEA: 0
ABDOMINAL PAIN: 0
GASTROINTESTINAL NEGATIVE: 1
SHORTNESS OF BREATH: 0
EYE REDNESS: 0

## 2025-03-03 NOTE — PROGRESS NOTES
Review of Systems   Constitutional: Negative.  Negative for appetite change, chills and fever.   HENT: Negative.     Eyes: Negative.  Negative for pain, redness and visual disturbance.   Respiratory: Negative.  Negative for cough, shortness of breath and wheezing.    Cardiovascular: Negative.  Negative for chest pain and leg swelling.   Gastrointestinal: Negative.  Negative for abdominal pain, nausea and vomiting.   Endocrine: Negative.    Genitourinary:  Positive for frequency and urgency. Negative for difficulty urinating, dysuria, flank pain, hematuria and testicular pain.   Musculoskeletal: Negative.  Negative for back pain, joint swelling and myalgias.   Skin: Negative.  Negative for color change, rash and wound.   Allergic/Immunologic: Negative.    Neurological: Negative.  Negative for dizziness, tremors, weakness, numbness and headaches.   Hematological: Negative.  Negative for adenopathy. Does not bruise/bleed easily.   Psychiatric/Behavioral: Negative.       Indication: BPH with obstruction/lower urinary tract symptoms   Diagnosis: BPH with obstruction/lower urinary tract symptoms       Patient presents to the office for PVR. PVR obtained per office provider protocol. Patient voided prior, PVR 109ml. Patient tolerated procedure with no complications. Further instructions provided to patient for follow up care.    
   Comment: once a week 1 [facility]       REVIEW OF SYSTEMS:  Review of Systems    Physical Exam:      Vitals:    03/03/25 1122   BP: 118/76   Pulse: 78   Temp: 97.6 °F (36.4 °C)   SpO2: 97%     Body mass index is 30.64 kg/m².  Patient is a 70 y.o. male in no acute distress and alert and oriented to person, place and time.  Physical Exam  Constitutional: Patient in no acute distress.  Neuro: Alert and oriented to person, place and time.  Psych: Mood normal, affect normal      Assessment and Plan      1. BPH with obstruction/lower urinary tract symptoms    2. Urgency of urination    3. Urinary frequency    4. Prostate cancer screening           Plan:    Assessment & Plan   S/p GLL, urinary symptoms not at goal.   PVR is acceptable.   Continue finasteride.   Will increase vesicare, hopefully this provides him some relief for his travels.   Discussed condom cath, which may be benefical for his drive, he will try it.   I gave him 4 samples in the office today.   He will fu in ~6-8 weeks with a PVR and PSA.     Return in about 6 weeks (around 4/14/2025) for fu bph and oab with Dr. Clemens with pvr .    Prescriptions Ordered:  Orders Placed This Encounter   Medications    solifenacin (VESICARE) 10 MG tablet     Sig: Take 1 tablet by mouth daily     Dispense:  30 tablet     Refill:  3     Orders Placed:  Orders Placed This Encounter   Procedures    CHELSI,POST-VOID RES,US,NON-IMAGING    PSA Screening     Standing Status:   Future     Standing Expiration Date:   3/3/2026           HAMILTON Marquis CNP    Reviewed and agree with the ROS entered by the MA.

## 2025-04-14 ENCOUNTER — OFFICE VISIT (OUTPATIENT)
Dept: UROLOGY | Age: 70
End: 2025-04-14
Payer: MEDICARE

## 2025-04-14 VITALS — SYSTOLIC BLOOD PRESSURE: 120 MMHG | DIASTOLIC BLOOD PRESSURE: 72 MMHG

## 2025-04-14 DIAGNOSIS — R33.9 INCOMPLETE BLADDER EMPTYING: Primary | ICD-10-CM

## 2025-04-14 PROCEDURE — 51798 US URINE CAPACITY MEASURE: CPT | Performed by: UROLOGY

## 2025-04-14 PROCEDURE — 99024 POSTOP FOLLOW-UP VISIT: CPT | Performed by: UROLOGY

## 2025-04-14 RX ORDER — VIBEGRON 75 MG/1
75 TABLET, FILM COATED ORAL DAILY
Qty: 30 TABLET | Refills: 5 | Status: SHIPPED | OUTPATIENT
Start: 2025-04-14

## 2025-04-14 ASSESSMENT — ENCOUNTER SYMPTOMS
EYE REDNESS: 0
COLOR CHANGE: 0
EYE PAIN: 0
COUGH: 0
VOMITING: 0
BACK PAIN: 0
SHORTNESS OF BREATH: 0
WHEEZING: 0
NAUSEA: 0
ABDOMINAL PAIN: 0

## 2025-04-14 NOTE — PROGRESS NOTES
Review of Systems   Constitutional:  Negative for activity change, chills and fever.   Eyes:  Negative for pain, redness and visual disturbance.   Respiratory:  Negative for cough, shortness of breath and wheezing.    Cardiovascular:  Negative for chest pain and leg swelling.   Gastrointestinal:  Negative for abdominal pain, nausea and vomiting.   Genitourinary:  Positive for frequency and urgency. Negative for difficulty urinating, dysuria, flank pain, hematuria, penile discharge, scrotal swelling and testicular pain.   Musculoskeletal:  Negative for back pain, joint swelling and myalgias.   Skin:  Negative for color change and rash.   Neurological:  Negative for dizziness, tremors and numbness.   Hematological:  Negative for adenopathy. Does not bruise/bleed easily.

## 2025-04-14 NOTE — PROGRESS NOTES
[facility] PHYSICIANS [facility] UROLOGY CENTER  [address]  [facility] [address]  Dept: [phone]    [facility] Urology Office Note - Established    Patient:  Jose Guadalupe Sharpe  YOB: 1955  Date: 4/14/2025    The patient is a 70 y.o. male who presents todayfor evaluation of the following problems:   Chief Complaint   Patient presents with    Post-Op Check     GLL 1/31, still having frequency and urgency. Vesicare was increased in March - no relief       HPI  Sp pvp  Complaining of frequency  On vesicare 10mg daily  Pvr 0    Summary of old records: N/A    Additional History: N/A    Procedures Today: N/A    Urinalysis today:  No results found for this visit on 04/14/25.  Last several PSA's:  Lab Results   Component Value Date    PSA <0.03 07/29/2024    PSA 0.21 05/08/2023    PSA 0.18 04/05/2018     Last total testosterone:  No results found for: \"TESTOSTERONE\"    AUA Symptom Score (4/14/2025):  INCOMPLETE EMPTYING: How often have you had the sensation of not emptying your bladder?: Less than 1 to 5 times  FREQUENCY: How often do you have to urinate less than every two hours?: Almost always  INTERMITTENCY: How often have you found you stopped and started again several times when you urinated?: Not at all  URGENCY: How often have you found it difficult to postpone urination?: About Half the time  WEAK STREAM: How often have you had a weak urinary stream?: Less than Half the time  STRAINING: How often have you had to strain to start  urination?: Not at all  NOCTURIA: How many times did you typically get up at night to uriniate?: 3 Times  TOTAL I-PSS SCORE:: 14  How would you feel if you were to spend the rest of your life with your urinary condition?: Mostly Dissatisfied    Last BUN and creatinine:  Lab Results   Component Value Date    BUN 14 01/16/2025     Lab Results   Component Value Date    CREATININE 1.1 01/16/2025       Additional Lab/Culture results:

## 2025-06-16 ENCOUNTER — OFFICE VISIT (OUTPATIENT)
Dept: UROLOGY | Age: 70
End: 2025-06-16
Payer: MEDICARE

## 2025-06-16 VITALS — TEMPERATURE: 97.5 F | HEART RATE: 77 BPM | DIASTOLIC BLOOD PRESSURE: 82 MMHG | SYSTOLIC BLOOD PRESSURE: 144 MMHG

## 2025-06-16 DIAGNOSIS — N13.8 BPH WITH OBSTRUCTION/LOWER URINARY TRACT SYMPTOMS: ICD-10-CM

## 2025-06-16 DIAGNOSIS — R35.0 URINARY FREQUENCY: ICD-10-CM

## 2025-06-16 DIAGNOSIS — N40.1 BPH WITH OBSTRUCTION/LOWER URINARY TRACT SYMPTOMS: ICD-10-CM

## 2025-06-16 DIAGNOSIS — Z12.5 PROSTATE CANCER SCREENING: ICD-10-CM

## 2025-06-16 DIAGNOSIS — R33.9 INCOMPLETE BLADDER EMPTYING: Primary | ICD-10-CM

## 2025-06-16 DIAGNOSIS — R39.15 URGENCY OF URINATION: ICD-10-CM

## 2025-06-16 PROCEDURE — 1159F MED LIST DOCD IN RCRD: CPT | Performed by: UROLOGY

## 2025-06-16 PROCEDURE — G8427 DOCREV CUR MEDS BY ELIG CLIN: HCPCS | Performed by: UROLOGY

## 2025-06-16 PROCEDURE — 51798 US URINE CAPACITY MEASURE: CPT | Performed by: UROLOGY

## 2025-06-16 PROCEDURE — 3077F SYST BP >= 140 MM HG: CPT | Performed by: UROLOGY

## 2025-06-16 PROCEDURE — 99214 OFFICE O/P EST MOD 30 MIN: CPT | Performed by: UROLOGY

## 2025-06-16 PROCEDURE — G8417 CALC BMI ABV UP PARAM F/U: HCPCS | Performed by: UROLOGY

## 2025-06-16 PROCEDURE — 3017F COLORECTAL CA SCREEN DOC REV: CPT | Performed by: UROLOGY

## 2025-06-16 PROCEDURE — 1036F TOBACCO NON-USER: CPT | Performed by: UROLOGY

## 2025-06-16 PROCEDURE — 3079F DIAST BP 80-89 MM HG: CPT | Performed by: UROLOGY

## 2025-06-16 PROCEDURE — 1123F ACP DISCUSS/DSCN MKR DOCD: CPT | Performed by: UROLOGY

## 2025-06-16 RX ORDER — MIRABEGRON 50 MG/1
50 TABLET, FILM COATED, EXTENDED RELEASE ORAL DAILY
Qty: 90 TABLET | Refills: 1 | Status: SHIPPED | OUTPATIENT
Start: 2025-06-16 | End: 2025-06-17 | Stop reason: SDUPTHER

## 2025-06-16 ASSESSMENT — ENCOUNTER SYMPTOMS
SHORTNESS OF BREATH: 0
BACK PAIN: 0
ABDOMINAL PAIN: 0

## 2025-06-16 NOTE — PROGRESS NOTES
Review of Systems   Constitutional:  Negative for fatigue and fever.   Respiratory:  Negative for shortness of breath.    Cardiovascular:  Negative for chest pain.   Gastrointestinal:  Negative for abdominal pain.   Genitourinary:  Positive for frequency. Negative for difficulty urinating, dysuria, flank pain, hematuria, penile pain, penile swelling, scrotal swelling, testicular pain and urgency.   Musculoskeletal:  Negative for back pain.   Neurological:  Negative for weakness.     Indication: Feeling incomplete emptying   Diagnosis: R39.14      Patient presents to the office for PVR. PVR obtained per office provider protocol. Patient voided prior, PVR 76ml. Patient tolerated procedure with no complications. Further instructions provided to patient for follow up care.

## 2025-06-16 NOTE — PROGRESS NOTES
[facility] PHYSICIANS [facility] UROLOGY CENTER  [address]  [facility] [address]  Dept: [phone]    [facility] Urology Office Note - Established    Patient:  Jose Guadalupe Sharpe  YOB: 1955  Date: 6/16/2025    The patient is a 70 y.o. male who presents todayfor evaluation of the following problems:   Chief Complaint   Patient presents with    Follow-up     2 month-med check (Gemtesa)/ PVR       HPI  This is a 70-year-old gentleman who had a laser vaporization of the prostate earlier this year.  He was complaining of frequency.  He was on Vesicare 10 mg daily which was only providing him minimal relief.  We had switched him to Gemtesa but this was too expensive. His last psa was last July.    Summary of old records: N/A    Additional History: N/A    Procedures Today: N/A    Urinalysis today:  No results found for this visit on 06/16/25.  Last several PSA's:  Lab Results   Component Value Date    PSA <0.03 07/29/2024    PSA 0.21 05/08/2023    PSA 0.18 04/05/2018     Last total testosterone:  No results found for: \"TESTOSTERONE\"    AUA Symptom Score (6/16/2025):                               Last BUN and creatinine:  Lab Results   Component Value Date    BUN 14 01/16/2025     Lab Results   Component Value Date    CREATININE 1.1 01/16/2025       Additional Lab/Culture results: none    Imaging Reviewed during this Office Visit: none  (results were independently reviewed by physician and radiology report verified)    PAST MEDICAL, FAMILY AND SOCIAL HISTORY UPDATE:  Past Medical History:   Diagnosis Date    Asthma     Dr. Nagy Pulmonologist Wei Cruz. last seen 4-5 years ago    Benign bladder tumor     Bilateral hearing loss     uses aids  bilat    BPH (benign prostatic hyperplasia)     Cancer (HCC) 2011    lung    Carcinoid tumor of lung (HCC) 05/23/2011    right lung, lower lobe    Cataract     both eyes    GERD (gastroesophageal reflux disease)

## 2025-06-17 RX ORDER — MIRABEGRON 50 MG/1
50 TABLET, FILM COATED, EXTENDED RELEASE ORAL DAILY
Qty: 90 TABLET | Refills: 1 | Status: SHIPPED | OUTPATIENT
Start: 2025-06-17

## 2025-06-17 NOTE — TELEPHONE ENCOUNTER
Patient was called and informed that medication was sent to the [facility] in [place].

## 2025-06-17 NOTE — TELEPHONE ENCOUNTER
[facility] never got medication that was sent yesterday E-Prescribed failed.  Patient had appointment yesterday with Dr. Clemens.

## 2025-06-25 ENCOUNTER — TELEPHONE (OUTPATIENT)
Dept: UROLOGY | Age: 70
End: 2025-06-25

## 2025-06-25 RX ORDER — MIRABEGRON 50 MG/1
50 TABLET, FILM COATED, EXTENDED RELEASE ORAL DAILY
Qty: 90 TABLET | Refills: 3 | Status: SHIPPED | OUTPATIENT
Start: 2025-06-25

## 2025-06-25 NOTE — TELEPHONE ENCOUNTER
Patient is calling because the Myrbetriq is to expensive when using [facility] pharmacy. He would like to see if it's cheaper [facility] and if we can resend the script.

## 2025-07-17 ENCOUNTER — HOSPITAL ENCOUNTER (OUTPATIENT)
Age: 70
Discharge: HOME | End: 2025-07-17
Payer: MEDICARE

## 2025-07-17 ENCOUNTER — TELEPHONE (OUTPATIENT)
Dept: UROLOGY | Age: 70
End: 2025-07-17

## 2025-07-17 DIAGNOSIS — Z12.5 PROSTATE CANCER SCREENING: ICD-10-CM

## 2025-07-17 LAB — PSA SERPL-MCNC: 0.13 NG/ML (ref 0–4)

## 2025-07-17 PROCEDURE — 36415 COLL VENOUS BLD VENIPUNCTURE: CPT

## 2025-07-17 PROCEDURE — G0103 PSA SCREENING: HCPCS

## 2025-07-17 NOTE — TELEPHONE ENCOUNTER
I have left Mr. Sharpe, a voicemail reminding patient of appointment on 7/21/25 at 10:20am and labs that needs to be completed before this appointment.

## 2025-07-21 ENCOUNTER — OFFICE VISIT (OUTPATIENT)
Dept: UROLOGY | Age: 70
End: 2025-07-21
Payer: MEDICARE

## 2025-07-21 VITALS
RESPIRATION RATE: 12 BRPM | TEMPERATURE: 97 F | DIASTOLIC BLOOD PRESSURE: 72 MMHG | WEIGHT: 229 LBS | BODY MASS INDEX: 31.05 KG/M2 | SYSTOLIC BLOOD PRESSURE: 122 MMHG | HEART RATE: 70 BPM

## 2025-07-21 DIAGNOSIS — Z12.5 PROSTATE CANCER SCREENING: ICD-10-CM

## 2025-07-21 DIAGNOSIS — N40.1 BPH WITH OBSTRUCTION/LOWER URINARY TRACT SYMPTOMS: ICD-10-CM

## 2025-07-21 DIAGNOSIS — N13.8 BPH WITH OBSTRUCTION/LOWER URINARY TRACT SYMPTOMS: ICD-10-CM

## 2025-07-21 DIAGNOSIS — R35.0 URINARY FREQUENCY: ICD-10-CM

## 2025-07-21 DIAGNOSIS — R33.9 INCOMPLETE BLADDER EMPTYING: ICD-10-CM

## 2025-07-21 DIAGNOSIS — R39.15 URGENCY OF URINATION: Primary | ICD-10-CM

## 2025-07-21 LAB
APPEARANCE FLUID: NORMAL
BILIRUBIN, POC: NORMAL
BLOOD URINE, POC: NORMAL
CLARITY, POC: CLEAR
COLOR, POC: NORMAL
GLUCOSE URINE, POC: NORMAL MG/DL
KETONES, POC: NORMAL
LEUKOCYTE EST, POC: NORMAL
NITRITE, POC: NORMAL
PH, POC: NORMAL
PROTEIN, POC: NORMAL MG/DL
SPECIFIC GRAVITY, POC: NORMAL
UROBILINOGEN, POC: NORMAL

## 2025-07-21 PROCEDURE — 1159F MED LIST DOCD IN RCRD: CPT | Performed by: UROLOGY

## 2025-07-21 PROCEDURE — 3017F COLORECTAL CA SCREEN DOC REV: CPT | Performed by: UROLOGY

## 2025-07-21 PROCEDURE — 3078F DIAST BP <80 MM HG: CPT | Performed by: UROLOGY

## 2025-07-21 PROCEDURE — 1036F TOBACCO NON-USER: CPT | Performed by: UROLOGY

## 2025-07-21 PROCEDURE — G8417 CALC BMI ABV UP PARAM F/U: HCPCS | Performed by: UROLOGY

## 2025-07-21 PROCEDURE — 1123F ACP DISCUSS/DSCN MKR DOCD: CPT | Performed by: UROLOGY

## 2025-07-21 PROCEDURE — 81002 URINALYSIS NONAUTO W/O SCOPE: CPT | Performed by: UROLOGY

## 2025-07-21 PROCEDURE — G8427 DOCREV CUR MEDS BY ELIG CLIN: HCPCS | Performed by: UROLOGY

## 2025-07-21 PROCEDURE — 99214 OFFICE O/P EST MOD 30 MIN: CPT | Performed by: UROLOGY

## 2025-07-21 PROCEDURE — 51798 US URINE CAPACITY MEASURE: CPT | Performed by: UROLOGY

## 2025-07-21 PROCEDURE — 1126F AMNT PAIN NOTED NONE PRSNT: CPT | Performed by: UROLOGY

## 2025-07-21 PROCEDURE — 3074F SYST BP LT 130 MM HG: CPT | Performed by: UROLOGY

## 2025-07-21 NOTE — PROGRESS NOTES
Indication: Incomplete bladder emptying   Diagnosis: Incomplete bladder emptying       Patient presents to the office for PVR. PVR obtained per office provider protocol. Patient voided prior, PVR 27ml. Patient tolerated procedure with no complications. Further instructions provided to patient for follow up care.

## 2025-07-21 NOTE — PROGRESS NOTES
[facility] PHYSICIANS [facility] UROLOGY CENTER  [address]  [facility] [address]  Dept: [phone]    [facility] Urology Office Note - Established    Patient:  Jose Guadalupe Sharpe  YOB: 1955  Date: 7/21/2025    The patient is a 70 y.o. male who presents todayfor evaluation of the following problems:   Chief Complaint   Patient presents with    1 Month Follow-Up      1 month PVR/PSA       HPI  This is a very pleasant 70-year-old gentleman who we see for voiding dysfunction and stones.  He did have a laser vaporization of prostate approximately 6 months ago.  Although his stream is dramatically stronger, he is continuing to have significant persistent urgency and frequency.  He had tried Gemtesa with no success and more recently has been on Myrbetriq.  This has likewise not given him any relief of his irritative symptoms.  His postvoid residual today is 27 cc.    Summary of old records: N/A    Additional History: N/A    Procedures Today: N/A    Urinalysis today:  No results found for this visit on 07/21/25.  Last several PSA's:  Lab Results   Component Value Date    PSA 0.13 07/17/2025    PSA <0.03 07/29/2024    PSA 0.21 05/08/2023     Last total testosterone:  No results found for: \"TESTOSTERONE\"    AUA Symptom Score (7/21/2025):                               Last BUN and creatinine:  Lab Results   Component Value Date    BUN 14 01/16/2025     Lab Results   Component Value Date    CREATININE 1.1 01/16/2025       Additional Lab/Culture results: none    Imaging Reviewed during this Office Visit: none  (results were independently reviewed by physician and radiology report verified)    PAST MEDICAL, FAMILY AND SOCIAL HISTORY UPDATE:  Past Medical History:   Diagnosis Date    Asthma     Dr. Nagy Pulmonologist Wei Cruz. last seen 4-5 years ago    Benign bladder tumor     Bilateral hearing loss     uses aids  bilat    BPH (benign prostatic hyperplasia)

## 2025-07-29 ENCOUNTER — OFFICE VISIT (OUTPATIENT)
Dept: PRIMARY CARE CLINIC | Age: 70
End: 2025-07-29

## 2025-07-29 VITALS
HEIGHT: 72 IN | OXYGEN SATURATION: 97 % | BODY MASS INDEX: 30.75 KG/M2 | HEART RATE: 81 BPM | DIASTOLIC BLOOD PRESSURE: 72 MMHG | SYSTOLIC BLOOD PRESSURE: 132 MMHG | WEIGHT: 227 LBS

## 2025-07-29 DIAGNOSIS — N40.1 BPH WITH OBSTRUCTION/LOWER URINARY TRACT SYMPTOMS: ICD-10-CM

## 2025-07-29 DIAGNOSIS — N28.1 ACQUIRED CYSTIC KIDNEY DISEASE: ICD-10-CM

## 2025-07-29 DIAGNOSIS — G89.29 CHRONIC LOW BACK PAIN, UNSPECIFIED BACK PAIN LATERALITY, UNSPECIFIED WHETHER SCIATICA PRESENT: ICD-10-CM

## 2025-07-29 DIAGNOSIS — Z71.89 ACP (ADVANCE CARE PLANNING): ICD-10-CM

## 2025-07-29 DIAGNOSIS — D49.4 NEOPLASM OF BLADDER: ICD-10-CM

## 2025-07-29 DIAGNOSIS — N20.0 RENAL CALCULUS, LEFT: ICD-10-CM

## 2025-07-29 DIAGNOSIS — Z12.5 SCREENING FOR PROSTATE CANCER: ICD-10-CM

## 2025-07-29 DIAGNOSIS — N13.8 BPH WITH OBSTRUCTION/LOWER URINARY TRACT SYMPTOMS: ICD-10-CM

## 2025-07-29 DIAGNOSIS — K76.9 CHRONIC NONALCOHOLIC LIVER DISEASE: ICD-10-CM

## 2025-07-29 DIAGNOSIS — I10 ESSENTIAL HYPERTENSION: ICD-10-CM

## 2025-07-29 DIAGNOSIS — M51.9 DISORDER OF INTERVERTEBRAL DISC OF LUMBAR SPINE: ICD-10-CM

## 2025-07-29 DIAGNOSIS — Z86.0101 HISTORY OF ADENOMATOUS POLYP OF COLON: ICD-10-CM

## 2025-07-29 DIAGNOSIS — Z13.6 SCREENING FOR CARDIOVASCULAR CONDITION: ICD-10-CM

## 2025-07-29 DIAGNOSIS — E11.9 TYPE 2 DIABETES MELLITUS WITHOUT COMPLICATION, WITH LONG-TERM CURRENT USE OF INSULIN (HCC): Primary | ICD-10-CM

## 2025-07-29 DIAGNOSIS — R91.8 LUNG NODULES: ICD-10-CM

## 2025-07-29 DIAGNOSIS — Z79.4 TYPE 2 DIABETES MELLITUS WITHOUT COMPLICATION, WITH LONG-TERM CURRENT USE OF INSULIN (HCC): Primary | ICD-10-CM

## 2025-07-29 DIAGNOSIS — Z76.89 ENCOUNTER TO ESTABLISH CARE: ICD-10-CM

## 2025-07-29 DIAGNOSIS — Z00.00 MEDICARE ANNUAL WELLNESS VISIT, SUBSEQUENT: ICD-10-CM

## 2025-07-29 DIAGNOSIS — M54.50 CHRONIC LOW BACK PAIN, UNSPECIFIED BACK PAIN LATERALITY, UNSPECIFIED WHETHER SCIATICA PRESENT: ICD-10-CM

## 2025-07-29 SDOH — ECONOMIC STABILITY: FOOD INSECURITY: WITHIN THE PAST 12 MONTHS, YOU WORRIED THAT YOUR FOOD WOULD RUN OUT BEFORE YOU GOT MONEY TO BUY MORE.: NEVER TRUE

## 2025-07-29 SDOH — ECONOMIC STABILITY: FOOD INSECURITY: WITHIN THE PAST 12 MONTHS, THE FOOD YOU BOUGHT JUST DIDN'T LAST AND YOU DIDN'T HAVE MONEY TO GET MORE.: NEVER TRUE

## 2025-07-29 ASSESSMENT — PATIENT HEALTH QUESTIONNAIRE - PHQ9
1. LITTLE INTEREST OR PLEASURE IN DOING THINGS: NOT AT ALL
SUM OF ALL RESPONSES TO PHQ QUESTIONS 1-9: 0
DEPRESSION UNABLE TO ASSESS: FUNCTIONAL CAPACITY MOTIVATION LIMITS ACCURACY
2. FEELING DOWN, DEPRESSED OR HOPELESS: NOT AT ALL
SUM OF ALL RESPONSES TO PHQ QUESTIONS 1-9: 0

## 2025-07-29 ASSESSMENT — LIFESTYLE VARIABLES
HOW OFTEN DO YOU HAVE A DRINK CONTAINING ALCOHOL: MONTHLY OR LESS
HOW MANY STANDARD DRINKS CONTAINING ALCOHOL DO YOU HAVE ON A TYPICAL DAY: 3 OR 4

## 2025-07-29 ASSESSMENT — VISUAL ACUITY
OD_CC: 20/25
OS_CC: 20/25

## 2025-07-31 ENCOUNTER — HOSPITAL ENCOUNTER (OUTPATIENT)
Dept: CT IMAGING | Age: 70
Discharge: HOME | End: 2025-08-02
Payer: MEDICARE

## 2025-07-31 DIAGNOSIS — R91.8 LUNG NODULES: ICD-10-CM

## 2025-07-31 PROCEDURE — 71250 CT THORAX DX C-: CPT

## 2025-08-04 ENCOUNTER — HOSPITAL ENCOUNTER (OUTPATIENT)
Age: 70
Setting detail: SPECIMEN
Discharge: HOME | End: 2025-08-04

## 2025-08-04 ENCOUNTER — PROCEDURE VISIT (OUTPATIENT)
Dept: UROLOGY | Age: 70
End: 2025-08-04
Payer: MEDICARE

## 2025-08-04 DIAGNOSIS — R39.15 URGENCY OF URINATION: ICD-10-CM

## 2025-08-04 DIAGNOSIS — R39.15 URGENCY OF URINATION: Primary | ICD-10-CM

## 2025-08-04 PROCEDURE — 52000 CYSTOURETHROSCOPY: CPT | Performed by: UROLOGY

## 2025-08-04 RX ORDER — OXYBUTYNIN CHLORIDE 10 MG/1
10 TABLET, EXTENDED RELEASE ORAL DAILY
Qty: 90 TABLET | Refills: 3 | Status: SHIPPED | OUTPATIENT
Start: 2025-08-04

## 2025-08-05 LAB
MICROORGANISM SPEC CULT: NO GROWTH
SERVICE CMNT-IMP: NORMAL
SPECIMEN DESCRIPTION: NORMAL

## (undated) DEVICE — GUIDEWIRE URO L150CM DIA0.035IN STIFF NIT HYDRPHLC STR TIP

## (undated) DEVICE — SINGLE ACTION PUMPING SYSTEM

## (undated) DEVICE — DRAPE,REIN 53X77,STERILE: Brand: MEDLINE

## (undated) DEVICE — ADAPTER URO SCP UROLOK LL

## (undated) DEVICE — ARM DRAPE

## (undated) DEVICE — GLOVE ORANGE PI 7 1/2   MSG9075

## (undated) DEVICE — SUTURE MCRYL SZ 4-0 L27IN ABSRB UD L19MM PS-2 1/2 CIR PRIM Y426H

## (undated) DEVICE — 3M™ STERI-STRIP™ COMPOUND BENZOIN TINCTURE 40 BAGS/CARTON 4 CARTONS/CASE C1544: Brand: 3M™ STERI-STRIP™

## (undated) DEVICE — POSITIONER HD W8XH4XL8.5IN RASPBERRY FOAM SLT

## (undated) DEVICE — ADHESIVE SKIN CLSR 0.7ML TOP DERMBND ADV

## (undated) DEVICE — INSUFFLATION NEEDLE TO ESTABLISH PNEUMOPERITONEUM.: Brand: INSUFFLATION NEEDLE

## (undated) DEVICE — SYRINGE MED 10ML LUERLOCK TIP W/O SFTY DISP

## (undated) DEVICE — FIBER LASER HOLM DISP SU200RT] LEONI FIBER OPTICS INC]

## (undated) DEVICE — DUAL LUMEN URETERAL CATHETER

## (undated) DEVICE — ST. ANNE'S MULTI PORT PACK: Brand: MEDLINE INDUSTRIES, INC.

## (undated) DEVICE — GARMENT COMPR STD FOR 17IN CALF UNIF THER FLOTRN

## (undated) DEVICE — PACK PROCEDURE SURG CYSTO SVMMC LF

## (undated) DEVICE — TIP COVER ACCESSORY

## (undated) DEVICE — STRIP,CLOSURE,WOUND,MEDI-STRIP,1/2X4: Brand: MEDLINE

## (undated) DEVICE — SUTURE VCRL SZ 3-0 L27IN ABSRB UD L26MM SH 1/2 CIR J416H

## (undated) DEVICE — GOWN,SIRUS,NONRNF,SETINSLV,XL,20/CS: Brand: MEDLINE

## (undated) DEVICE — RENTAL LASER XPS CASE

## (undated) DEVICE — SOLUTION IRRIG 3000ML 0.9% SOD CHL USP UROMATIC PLAS CONT

## (undated) DEVICE — Device

## (undated) DEVICE — PENCIL ES L3M BTTN SWCH HOLSTER W/ BLDE ELECTRD EDGE

## (undated) DEVICE — FIBER LASER MOXY 532NM WAVELENGTH LIQUID COOLED SINGLE-USE F/GREENLIGHT XPS SYSTEM

## (undated) DEVICE — CANNULA SEAL

## (undated) DEVICE — GARMENT,MEDLINE,DVT,INT,CALF,MED, GEN2: Brand: MEDLINE

## (undated) DEVICE — SUTURE V-LOC 180 SZ 3-0 L6IN ABSRB GRN V-20 L26MM 1/2 CIR VLOCL0604

## (undated) DEVICE — SOLUTION IV IRRIG POUR BRL 0.9% SODIUM CHL 2F7124

## (undated) DEVICE — SUTURE PROL SZ 4-0 L18IN NONABSORBABLE BLU L16MM PC-3 3/8 8634G

## (undated) DEVICE — INTENDED FOR TISSUE SEPARATION, AND OTHER PROCEDURES THAT REQUIRE A SHARP SURGICAL BLADE TO PUNCTURE OR CUT.: Brand: BARD-PARKER ® SAFETYLOCK CARBON RIB-BACK BLADES

## (undated) DEVICE — LITHOTRIPSY TREATMENT

## (undated) DEVICE — GLOVE SURG SZ 7 CRM LTX FREE POLYISOPRENE POLYMER BEAD ANTI

## (undated) DEVICE — SUTURE VCRL SZ 4-0 L18IN ABSRB UD L13MM P-3 3/8 CIR PRIM J494H

## (undated) DEVICE — STRAP,CATHETER,ELASTIC,HOOK&LOOP: Brand: MEDLINE

## (undated) DEVICE — BLADELESS OBTURATOR: Brand: WECK VISTA

## (undated) DEVICE — GLOVE ORANGE PI 7   MSG9070

## (undated) DEVICE — GLOVE SURG SZ 65 THK91MIL LTX FREE SYN POLYISOPRENE

## (undated) DEVICE — STANDARD HYPODERMIC NEEDLE,POLYPROPYLENE HUB: Brand: MONOJECT

## (undated) DEVICE — CATHETER URETH 22FR BLLN 30CC 3 W F SPEC INF CTRL BARDX

## (undated) DEVICE — CYSTO/BLADDER IRRIGATION SET, REGULATING CLAMP

## (undated) DEVICE — CHLORAPREP 26ML ORANGE

## (undated) DEVICE — NEEDLE HYPO 25GA L1.5IN BLU POLYPR HUB S STL REG BVL STR

## (undated) DEVICE — BLANKET WRM W29.9XL79.1IN UP BODY FORC AIR MISTRAL-AIR

## (undated) DEVICE — MHPB GEN MINOR PACK: Brand: MEDLINE INDUSTRIES, INC.

## (undated) DEVICE — SEAL ENDO INSTR SELF SEAL UROLOGY

## (undated) DEVICE — SOLUTION SURG PREP POV IOD 7.5% 4 OZ

## (undated) DEVICE — SYRINGE MED 20ML STD CLR PLAS LUERLOCK TIP N CTRL DISP

## (undated) DEVICE — ELECTRODE PT RET AD L9FT HI MOIST COND ADH HYDRGEL CORDED

## (undated) DEVICE — STRAP ARMBRD W1.5XL32IN FOAM STR YET SFT W/ HK AND LOOP